# Patient Record
Sex: MALE | Race: WHITE | NOT HISPANIC OR LATINO | Employment: OTHER | URBAN - METROPOLITAN AREA
[De-identification: names, ages, dates, MRNs, and addresses within clinical notes are randomized per-mention and may not be internally consistent; named-entity substitution may affect disease eponyms.]

---

## 2017-01-09 ENCOUNTER — HOSPITAL ENCOUNTER (OUTPATIENT)
Dept: PAIN MEDICINE | Facility: HOSPITAL | Age: 62
Discharge: HOME | End: 2017-01-09
Attending: PHYSICAL MEDICINE & REHABILITATION | Admitting: PHYSICAL MEDICINE & REHABILITATION

## 2017-01-09 LAB
AMPHETAMINES UR QL SCN: NEGATIVE
BZE UR QL SCN: NEGATIVE
CREAT 24H UR-MCNC: NORMAL MG/DL
METHADONE UR QL SCN: NEGATIVE
OPIATE CONFIRMATION URINE: NORMAL
THC SERPLBLD CFM-MCNC: NEGATIVE NG/ML

## 2017-03-02 ENCOUNTER — HOSPITAL ENCOUNTER (OUTPATIENT)
Dept: PAIN MEDICINE | Facility: HOSPITAL | Age: 62
Discharge: HOME | End: 2017-03-02
Attending: PHYSICAL MEDICINE & REHABILITATION | Admitting: PHYSICAL MEDICINE & REHABILITATION

## 2017-04-25 ENCOUNTER — HOSPITAL ENCOUNTER (OUTPATIENT)
Dept: SLEEP MEDICINE | Facility: HOSPITAL | Age: 62
Discharge: HOME | End: 2017-04-25
Attending: INTERNAL MEDICINE | Admitting: INTERNAL MEDICINE

## 2017-04-27 ENCOUNTER — HOSPITAL ENCOUNTER (OUTPATIENT)
Dept: PAIN MEDICINE | Facility: HOSPITAL | Age: 62
Discharge: HOME | End: 2017-04-27
Attending: PHYSICAL MEDICINE & REHABILITATION | Admitting: PHYSICAL MEDICINE & REHABILITATION

## 2017-06-29 ENCOUNTER — HOSPITAL ENCOUNTER (OUTPATIENT)
Dept: PAIN MEDICINE | Facility: HOSPITAL | Age: 62
Discharge: HOME | End: 2017-06-29
Attending: PHYSICAL MEDICINE & REHABILITATION | Admitting: PHYSICAL MEDICINE & REHABILITATION

## 2017-08-24 ENCOUNTER — HOSPITAL ENCOUNTER (OUTPATIENT)
Dept: PAIN MEDICINE | Facility: HOSPITAL | Age: 62
Discharge: HOME | End: 2017-08-24
Attending: PHYSICAL MEDICINE & REHABILITATION | Admitting: PHYSICAL MEDICINE & REHABILITATION

## 2017-10-26 ENCOUNTER — HOSPITAL ENCOUNTER (OUTPATIENT)
Dept: PAIN MEDICINE | Facility: HOSPITAL | Age: 62
Discharge: HOME | End: 2017-10-26
Attending: PHYSICAL MEDICINE & REHABILITATION | Admitting: PHYSICAL MEDICINE & REHABILITATION

## 2017-12-28 ENCOUNTER — HOSPITAL ENCOUNTER (OUTPATIENT)
Dept: PAIN MEDICINE | Facility: HOSPITAL | Age: 62
Discharge: HOME | End: 2017-12-28
Attending: PHYSICAL MEDICINE & REHABILITATION | Admitting: PHYSICAL MEDICINE & REHABILITATION

## 2018-02-22 ENCOUNTER — HOSPITAL ENCOUNTER (OUTPATIENT)
Dept: PAIN MEDICINE | Facility: HOSPITAL | Age: 63
Discharge: HOME | End: 2018-02-22
Attending: PHYSICAL MEDICINE & REHABILITATION | Admitting: PHYSICAL MEDICINE & REHABILITATION

## 2018-04-26 ENCOUNTER — HOSPITAL ENCOUNTER (OUTPATIENT)
Dept: PAIN MEDICINE | Facility: HOSPITAL | Age: 63
Discharge: HOME | End: 2018-04-26
Attending: PHYSICAL MEDICINE & REHABILITATION | Admitting: PHYSICAL MEDICINE & REHABILITATION

## 2018-06-21 ENCOUNTER — HOSPITAL ENCOUNTER (OUTPATIENT)
Dept: PAIN MEDICINE | Facility: HOSPITAL | Age: 63
Discharge: HOME | End: 2018-06-21
Attending: PHYSICAL MEDICINE & REHABILITATION | Admitting: PHYSICAL MEDICINE & REHABILITATION

## 2023-02-13 ENCOUNTER — HOSPITAL ENCOUNTER (OUTPATIENT)
Facility: HOSPITAL | Age: 68
Discharge: NURSING, REHAB OR LONG-TERM CARE | End: 2023-02-16
Attending: EMERGENCY MEDICINE | Admitting: SURGERY
Payer: MEDICAID

## 2023-02-13 ENCOUNTER — APPOINTMENT (OUTPATIENT)
Dept: CT IMAGING | Facility: HOSPITAL | Age: 68
End: 2023-02-13
Payer: MEDICAID

## 2023-02-13 DIAGNOSIS — K81.0 ACUTE CHOLECYSTITIS: ICD-10-CM

## 2023-02-13 DIAGNOSIS — R10.11 ABDOMINAL PAIN, ACUTE, RIGHT UPPER QUADRANT: Primary | ICD-10-CM

## 2023-02-13 DIAGNOSIS — K80.00 CALCULUS OF GALLBLADDER WITH ACUTE CHOLECYSTITIS WITHOUT OBSTRUCTION: ICD-10-CM

## 2023-02-13 LAB
ALBUMIN SERPL-MCNC: 4.2 G/DL (ref 3.5–5.2)
ALBUMIN/GLOB SERPL: 1.2 G/DL
ALP SERPL-CCNC: 87 U/L (ref 39–117)
ALT SERPL W P-5'-P-CCNC: 13 U/L (ref 1–41)
ANION GAP SERPL CALCULATED.3IONS-SCNC: 11 MMOL/L (ref 5–15)
AST SERPL-CCNC: 16 U/L (ref 1–40)
BASOPHILS # BLD AUTO: 0.1 10*3/MM3 (ref 0–0.2)
BASOPHILS NFR BLD AUTO: 0.7 % (ref 0–1.5)
BILIRUB SERPL-MCNC: 0.4 MG/DL (ref 0–1.2)
BUN SERPL-MCNC: 8 MG/DL (ref 8–23)
BUN/CREAT SERPL: 9.6 (ref 7–25)
CALCIUM SPEC-SCNC: 9.2 MG/DL (ref 8.6–10.5)
CHLORIDE SERPL-SCNC: 99 MMOL/L (ref 98–107)
CO2 SERPL-SCNC: 24 MMOL/L (ref 22–29)
CREAT SERPL-MCNC: 0.83 MG/DL (ref 0.76–1.27)
DEPRECATED RDW RBC AUTO: 48.1 FL (ref 37–54)
EGFRCR SERPLBLD CKD-EPI 2021: 95.3 ML/MIN/1.73
EOSINOPHIL # BLD AUTO: 0.1 10*3/MM3 (ref 0–0.4)
EOSINOPHIL NFR BLD AUTO: 0.4 % (ref 0.3–6.2)
ERYTHROCYTE [DISTWIDTH] IN BLOOD BY AUTOMATED COUNT: 15.2 % (ref 12.3–15.4)
GLOBULIN UR ELPH-MCNC: 3.5 GM/DL
GLUCOSE SERPL-MCNC: 189 MG/DL (ref 65–99)
HCT VFR BLD AUTO: 41.7 % (ref 37.5–51)
HGB BLD-MCNC: 14 G/DL (ref 13–17.7)
LIPASE SERPL-CCNC: 20 U/L (ref 13–60)
LYMPHOCYTES # BLD AUTO: 1.6 10*3/MM3 (ref 0.7–3.1)
LYMPHOCYTES NFR BLD AUTO: 9.1 % (ref 19.6–45.3)
MCH RBC QN AUTO: 28.5 PG (ref 26.6–33)
MCHC RBC AUTO-ENTMCNC: 33.5 G/DL (ref 31.5–35.7)
MCV RBC AUTO: 85 FL (ref 79–97)
MONOCYTES # BLD AUTO: 1.4 10*3/MM3 (ref 0.1–0.9)
MONOCYTES NFR BLD AUTO: 8.5 % (ref 5–12)
NEUTROPHILS NFR BLD AUTO: 13.9 10*3/MM3 (ref 1.7–7)
NEUTROPHILS NFR BLD AUTO: 81.3 % (ref 42.7–76)
NRBC BLD AUTO-RTO: 0 /100 WBC (ref 0–0.2)
PLATELET # BLD AUTO: 287 10*3/MM3 (ref 140–450)
PMV BLD AUTO: 8.4 FL (ref 6–12)
POTASSIUM SERPL-SCNC: 4.9 MMOL/L (ref 3.5–5.2)
PROT SERPL-MCNC: 7.7 G/DL (ref 6–8.5)
RBC # BLD AUTO: 4.91 10*6/MM3 (ref 4.14–5.8)
SODIUM SERPL-SCNC: 134 MMOL/L (ref 136–145)
WBC NRBC COR # BLD: 17.1 10*3/MM3 (ref 3.4–10.8)

## 2023-02-13 PROCEDURE — 80053 COMPREHEN METABOLIC PANEL: CPT | Performed by: NURSE PRACTITIONER

## 2023-02-13 PROCEDURE — 74176 CT ABD & PELVIS W/O CONTRAST: CPT

## 2023-02-13 PROCEDURE — G0378 HOSPITAL OBSERVATION PER HR: HCPCS

## 2023-02-13 PROCEDURE — 99285 EMERGENCY DEPT VISIT HI MDM: CPT

## 2023-02-13 PROCEDURE — 83690 ASSAY OF LIPASE: CPT | Performed by: NURSE PRACTITIONER

## 2023-02-13 PROCEDURE — 85025 COMPLETE CBC W/AUTO DIFF WBC: CPT | Performed by: NURSE PRACTITIONER

## 2023-02-13 RX ORDER — SODIUM CHLORIDE 0.9 % (FLUSH) 0.9 %
10 SYRINGE (ML) INJECTION AS NEEDED
Status: DISCONTINUED | OUTPATIENT
Start: 2023-02-13 | End: 2023-02-16 | Stop reason: HOSPADM

## 2023-02-13 RX ORDER — OLANZAPINE 10 MG/2ML
1 INJECTION, POWDER, LYOPHILIZED, FOR SOLUTION INTRAMUSCULAR
Status: DISCONTINUED | OUTPATIENT
Start: 2023-02-13 | End: 2023-02-16 | Stop reason: HOSPADM

## 2023-02-13 RX ORDER — NICOTINE POLACRILEX 4 MG
15 LOZENGE BUCCAL
Status: DISCONTINUED | OUTPATIENT
Start: 2023-02-13 | End: 2023-02-16 | Stop reason: HOSPADM

## 2023-02-13 RX ORDER — ONDANSETRON 2 MG/ML
4 INJECTION INTRAMUSCULAR; INTRAVENOUS EVERY 6 HOURS PRN
Status: DISCONTINUED | OUTPATIENT
Start: 2023-02-13 | End: 2023-02-16 | Stop reason: HOSPADM

## 2023-02-13 RX ORDER — KETOROLAC TROMETHAMINE 30 MG/ML
15 INJECTION, SOLUTION INTRAMUSCULAR; INTRAVENOUS ONCE
Status: DISCONTINUED | OUTPATIENT
Start: 2023-02-13 | End: 2023-02-13

## 2023-02-13 RX ORDER — SODIUM CHLORIDE 0.9 % (FLUSH) 0.9 %
10 SYRINGE (ML) INJECTION EVERY 12 HOURS SCHEDULED
Status: DISCONTINUED | OUTPATIENT
Start: 2023-02-13 | End: 2023-02-16 | Stop reason: HOSPADM

## 2023-02-13 RX ORDER — SODIUM CHLORIDE 9 MG/ML
40 INJECTION, SOLUTION INTRAVENOUS AS NEEDED
Status: DISCONTINUED | OUTPATIENT
Start: 2023-02-13 | End: 2023-02-16 | Stop reason: HOSPADM

## 2023-02-13 RX ORDER — ONDANSETRON 2 MG/ML
8 INJECTION INTRAMUSCULAR; INTRAVENOUS ONCE
Status: DISCONTINUED | OUTPATIENT
Start: 2023-02-13 | End: 2023-02-13

## 2023-02-13 RX ORDER — PANTOPRAZOLE SODIUM 40 MG/10ML
40 INJECTION, POWDER, LYOPHILIZED, FOR SOLUTION INTRAVENOUS ONCE
Status: DISCONTINUED | OUTPATIENT
Start: 2023-02-13 | End: 2023-02-13

## 2023-02-13 RX ORDER — NITROGLYCERIN 0.4 MG/1
0.4 TABLET SUBLINGUAL
Status: DISCONTINUED | OUTPATIENT
Start: 2023-02-13 | End: 2023-02-16 | Stop reason: HOSPADM

## 2023-02-13 RX ORDER — SODIUM CHLORIDE 9 MG/ML
100 INJECTION, SOLUTION INTRAVENOUS CONTINUOUS
Status: DISCONTINUED | OUTPATIENT
Start: 2023-02-13 | End: 2023-02-16

## 2023-02-13 RX ORDER — PANTOPRAZOLE SODIUM 40 MG/10ML
40 INJECTION, POWDER, LYOPHILIZED, FOR SOLUTION INTRAVENOUS
Status: DISCONTINUED | OUTPATIENT
Start: 2023-02-14 | End: 2023-02-16 | Stop reason: HOSPADM

## 2023-02-13 RX ORDER — DEXTROSE MONOHYDRATE 25 G/50ML
25 INJECTION, SOLUTION INTRAVENOUS
Status: DISCONTINUED | OUTPATIENT
Start: 2023-02-13 | End: 2023-02-16 | Stop reason: HOSPADM

## 2023-02-13 RX ORDER — INSULIN LISPRO 100 [IU]/ML
2-7 INJECTION, SOLUTION INTRAVENOUS; SUBCUTANEOUS
Status: DISCONTINUED | OUTPATIENT
Start: 2023-02-14 | End: 2023-02-16 | Stop reason: HOSPADM

## 2023-02-13 RX ORDER — ONDANSETRON 4 MG/1
4 TABLET, FILM COATED ORAL EVERY 6 HOURS PRN
Status: DISCONTINUED | OUTPATIENT
Start: 2023-02-13 | End: 2023-02-16 | Stop reason: HOSPADM

## 2023-02-14 ENCOUNTER — ANESTHESIA EVENT (OUTPATIENT)
Dept: PERIOP | Facility: HOSPITAL | Age: 68
End: 2023-02-14
Payer: MEDICAID

## 2023-02-14 PROBLEM — K81.0 ACUTE CHOLECYSTITIS: Status: ACTIVE | Noted: 2023-02-13

## 2023-02-14 LAB
ANION GAP SERPL CALCULATED.3IONS-SCNC: 11 MMOL/L (ref 5–15)
BACTERIA UR QL AUTO: ABNORMAL /HPF
BILIRUB UR QL STRIP: NEGATIVE
BUN SERPL-MCNC: 8 MG/DL (ref 8–23)
BUN/CREAT SERPL: 11 (ref 7–25)
CALCIUM SPEC-SCNC: 8.6 MG/DL (ref 8.6–10.5)
CHLORIDE SERPL-SCNC: 99 MMOL/L (ref 98–107)
CLARITY UR: CLEAR
CO2 SERPL-SCNC: 21 MMOL/L (ref 22–29)
COLOR UR: YELLOW
CREAT SERPL-MCNC: 0.73 MG/DL (ref 0.76–1.27)
DEPRECATED RDW RBC AUTO: 47.7 FL (ref 37–54)
EGFRCR SERPLBLD CKD-EPI 2021: 99.1 ML/MIN/1.73
ERYTHROCYTE [DISTWIDTH] IN BLOOD BY AUTOMATED COUNT: 15.1 % (ref 12.3–15.4)
GLUCOSE BLDC GLUCOMTR-MCNC: 147 MG/DL (ref 70–105)
GLUCOSE BLDC GLUCOMTR-MCNC: 154 MG/DL (ref 70–105)
GLUCOSE BLDC GLUCOMTR-MCNC: 167 MG/DL (ref 70–105)
GLUCOSE BLDC GLUCOMTR-MCNC: 170 MG/DL (ref 70–105)
GLUCOSE SERPL-MCNC: 159 MG/DL (ref 65–99)
GLUCOSE UR STRIP-MCNC: NEGATIVE MG/DL
HBA1C MFR BLD: 7.2 % (ref 3.5–5.6)
HCT VFR BLD AUTO: 39.9 % (ref 37.5–51)
HGB BLD-MCNC: 13 G/DL (ref 13–17.7)
HGB UR QL STRIP.AUTO: NEGATIVE
HYALINE CASTS UR QL AUTO: ABNORMAL /LPF
KETONES UR QL STRIP: NEGATIVE
LEUKOCYTE ESTERASE UR QL STRIP.AUTO: NEGATIVE
MCH RBC QN AUTO: 28.2 PG (ref 26.6–33)
MCHC RBC AUTO-ENTMCNC: 32.7 G/DL (ref 31.5–35.7)
MCV RBC AUTO: 86.2 FL (ref 79–97)
NITRITE UR QL STRIP: NEGATIVE
PH UR STRIP.AUTO: 7.5 [PH] (ref 5–8)
PLATELET # BLD AUTO: 238 10*3/MM3 (ref 140–450)
PMV BLD AUTO: 8.4 FL (ref 6–12)
POTASSIUM SERPL-SCNC: 4.8 MMOL/L (ref 3.5–5.2)
PROT UR QL STRIP: ABNORMAL
RBC # BLD AUTO: 4.63 10*6/MM3 (ref 4.14–5.8)
RBC # UR STRIP: ABNORMAL /HPF
REF LAB TEST METHOD: ABNORMAL
SODIUM SERPL-SCNC: 131 MMOL/L (ref 136–145)
SP GR UR STRIP: 1.02 (ref 1–1.03)
SQUAMOUS #/AREA URNS HPF: ABNORMAL /HPF
UROBILINOGEN UR QL STRIP: ABNORMAL
WBC # UR STRIP: ABNORMAL /HPF
WBC NRBC COR # BLD: 15.2 10*3/MM3 (ref 3.4–10.8)

## 2023-02-14 PROCEDURE — 80053 COMPREHEN METABOLIC PANEL: CPT | Performed by: STUDENT IN AN ORGANIZED HEALTH CARE EDUCATION/TRAINING PROGRAM

## 2023-02-14 PROCEDURE — 82962 GLUCOSE BLOOD TEST: CPT

## 2023-02-14 PROCEDURE — 85027 COMPLETE CBC AUTOMATED: CPT | Performed by: STUDENT IN AN ORGANIZED HEALTH CARE EDUCATION/TRAINING PROGRAM

## 2023-02-14 PROCEDURE — 96366 THER/PROPH/DIAG IV INF ADDON: CPT

## 2023-02-14 PROCEDURE — 81001 URINALYSIS AUTO W/SCOPE: CPT | Performed by: STUDENT IN AN ORGANIZED HEALTH CARE EDUCATION/TRAINING PROGRAM

## 2023-02-14 PROCEDURE — 36415 COLL VENOUS BLD VENIPUNCTURE: CPT | Performed by: STUDENT IN AN ORGANIZED HEALTH CARE EDUCATION/TRAINING PROGRAM

## 2023-02-14 PROCEDURE — G0378 HOSPITAL OBSERVATION PER HR: HCPCS

## 2023-02-14 PROCEDURE — 96361 HYDRATE IV INFUSION ADD-ON: CPT

## 2023-02-14 PROCEDURE — 63710000001 INSULIN LISPRO (HUMAN) PER 5 UNITS: Performed by: STUDENT IN AN ORGANIZED HEALTH CARE EDUCATION/TRAINING PROGRAM

## 2023-02-14 PROCEDURE — 96376 TX/PRO/DX INJ SAME DRUG ADON: CPT

## 2023-02-14 PROCEDURE — 25010000002 ONDANSETRON PER 1 MG: Performed by: STUDENT IN AN ORGANIZED HEALTH CARE EDUCATION/TRAINING PROGRAM

## 2023-02-14 PROCEDURE — 25010000002 PIPERACILLIN SOD-TAZOBACTAM PER 1 G: Performed by: STUDENT IN AN ORGANIZED HEALTH CARE EDUCATION/TRAINING PROGRAM

## 2023-02-14 PROCEDURE — 25010000002 MORPHINE PER 10 MG: Performed by: STUDENT IN AN ORGANIZED HEALTH CARE EDUCATION/TRAINING PROGRAM

## 2023-02-14 PROCEDURE — 96375 TX/PRO/DX INJ NEW DRUG ADDON: CPT

## 2023-02-14 PROCEDURE — 96365 THER/PROPH/DIAG IV INF INIT: CPT

## 2023-02-14 PROCEDURE — 99204 OFFICE O/P NEW MOD 45 MIN: CPT | Performed by: SURGERY

## 2023-02-14 PROCEDURE — 83036 HEMOGLOBIN GLYCOSYLATED A1C: CPT | Performed by: STUDENT IN AN ORGANIZED HEALTH CARE EDUCATION/TRAINING PROGRAM

## 2023-02-14 RX ORDER — ZOLPIDEM TARTRATE 10 MG/1
10 TABLET ORAL NIGHTLY
COMMUNITY
Start: 2023-01-26

## 2023-02-14 RX ORDER — BUPROPION HYDROCHLORIDE 300 MG/1
300 TABLET ORAL EVERY MORNING
COMMUNITY
Start: 2023-01-20

## 2023-02-14 RX ORDER — MELOXICAM 15 MG/1
15 TABLET ORAL ONCE
COMMUNITY
Start: 2023-01-22 | End: 2023-02-16 | Stop reason: HOSPADM

## 2023-02-14 RX ORDER — ALBUTEROL SULFATE 90 UG/1
2 AEROSOL, METERED RESPIRATORY (INHALATION) 4 TIMES DAILY PRN
COMMUNITY

## 2023-02-14 RX ORDER — BUPROPION HYDROCHLORIDE 150 MG/1
150 TABLET, EXTENDED RELEASE ORAL 3 TIMES DAILY
COMMUNITY
End: 2023-02-14

## 2023-02-14 RX ORDER — SERTRALINE HYDROCHLORIDE 100 MG/1
100 TABLET, FILM COATED ORAL EVERY MORNING
COMMUNITY
Start: 2023-01-20

## 2023-02-14 RX ORDER — ATORVASTATIN CALCIUM 10 MG/1
10 TABLET, FILM COATED ORAL ONCE
COMMUNITY
Start: 2023-01-20

## 2023-02-14 RX ORDER — TRAZODONE HYDROCHLORIDE 100 MG/1
100 TABLET ORAL NIGHTLY
COMMUNITY
Start: 2023-01-20

## 2023-02-14 RX ORDER — OMEPRAZOLE 20 MG/1
20 TABLET, DELAYED RELEASE ORAL ONCE
COMMUNITY
Start: 2023-01-05

## 2023-02-14 RX ORDER — VALBENAZINE 40 MG/1
40 CAPSULE ORAL EVERY MORNING
COMMUNITY
Start: 2017-06-29

## 2023-02-14 RX ADMIN — MORPHINE SULFATE 2 MG: 4 INJECTION, SOLUTION INTRAMUSCULAR; INTRAVENOUS at 09:16

## 2023-02-14 RX ADMIN — PIPERACILLIN AND TAZOBACTAM 4.5 G: 4; .5 INJECTION, POWDER, FOR SOLUTION INTRAVENOUS at 22:46

## 2023-02-14 RX ADMIN — PIPERACILLIN AND TAZOBACTAM 4.5 G: 4; .5 INJECTION, POWDER, FOR SOLUTION INTRAVENOUS at 00:03

## 2023-02-14 RX ADMIN — SODIUM CHLORIDE 100 ML/HR: 9 INJECTION, SOLUTION INTRAVENOUS at 00:03

## 2023-02-14 RX ADMIN — PIPERACILLIN AND TAZOBACTAM 4.5 G: 4; .5 INJECTION, POWDER, FOR SOLUTION INTRAVENOUS at 13:57

## 2023-02-14 RX ADMIN — ONDANSETRON 4 MG: 2 INJECTION INTRAMUSCULAR; INTRAVENOUS at 00:03

## 2023-02-14 RX ADMIN — MORPHINE SULFATE 2 MG: 4 INJECTION, SOLUTION INTRAMUSCULAR; INTRAVENOUS at 04:19

## 2023-02-14 RX ADMIN — MORPHINE SULFATE 2 MG: 4 INJECTION, SOLUTION INTRAMUSCULAR; INTRAVENOUS at 13:57

## 2023-02-14 RX ADMIN — Medication 10 ML: at 09:18

## 2023-02-14 RX ADMIN — PANTOPRAZOLE SODIUM 40 MG: 40 INJECTION, POWDER, LYOPHILIZED, FOR SOLUTION INTRAVENOUS at 06:20

## 2023-02-14 RX ADMIN — INSULIN LISPRO 2 UNITS: 100 INJECTION, SOLUTION INTRAVENOUS; SUBCUTANEOUS at 09:16

## 2023-02-14 RX ADMIN — MORPHINE SULFATE 2 MG: 4 INJECTION, SOLUTION INTRAMUSCULAR; INTRAVENOUS at 00:04

## 2023-02-14 RX ADMIN — INSULIN LISPRO 2 UNITS: 100 INJECTION, SOLUTION INTRAVENOUS; SUBCUTANEOUS at 17:25

## 2023-02-14 RX ADMIN — MORPHINE SULFATE 2 MG: 4 INJECTION, SOLUTION INTRAMUSCULAR; INTRAVENOUS at 19:31

## 2023-02-14 RX ADMIN — ONDANSETRON 4 MG: 2 INJECTION INTRAMUSCULAR; INTRAVENOUS at 19:31

## 2023-02-14 RX ADMIN — Medication 10 ML: at 22:47

## 2023-02-14 RX ADMIN — SODIUM CHLORIDE 100 ML/HR: 9 INJECTION, SOLUTION INTRAVENOUS at 06:22

## 2023-02-14 RX ADMIN — PIPERACILLIN AND TAZOBACTAM 4.5 G: 4; .5 INJECTION, POWDER, FOR SOLUTION INTRAVENOUS at 06:20

## 2023-02-14 NOTE — ED PROVIDER NOTES
Subjective   History of Present Illness  67-year-old male complaining onset of abdominal pain today.  He states it started out kind of in the midline in the upper part of the abdomen is now bit more right-sided he reports he has had subjective fever as well as chills.  Reports she has vomited 3 times he reports he has had loose stools x2.  He states he has not had much of an appetite today.  He states that he was originally concerned that something might of gone wrong with his hernia repair from 2010        Review of Systems    No past medical history on file.  History of hyperlipidemia, depression, muscle spasm, chronic pain  No Known Allergies    No past surgical history on file.  Knee repair from 2010  No family history on file.    Social History     Socioeconomic History   • Marital status: Single       Reports no unusual food water travel or activity    Objective   Physical Exam  Alert Luz Coma Scale 15   HEENT: Pupils equal and reactive to light. Conjunctivae are not injected. Normal tympanic membranes. Oropharynx and nares are normal.   Neck: Supple. Midline trachea. No JVD. No goiter.   Chest: Clear and equal breath sounds bilaterally, regular rate and rhythm without murmur or rub.   Abdomen: Positive bowel sounds, right upper quadrant tenderness noted positive Dodge sign the abdomen is obese but, nondistended. No rebound or peritoneal signs. No CVA tenderness.   Extremities no clubbing. cyanosis or edema. Motor sensory exam is normal. The full range of motion is intact   Skin: Warm and dry, no rashes or petechia.   Lymphatic: No regional lymphadenopathy. No calf pain, swelling or Homans sign    Procedures           ED Course      Labs Reviewed   COMPREHENSIVE METABOLIC PANEL - Abnormal; Notable for the following components:       Result Value    Glucose 189 (*)     Sodium 134 (*)     All other components within normal limits    Narrative:     GFR Normal >60  Chronic Kidney Disease <60  Kidney Failure  <15     CBC WITH AUTO DIFFERENTIAL - Abnormal; Notable for the following components:    WBC 17.10 (*)     Neutrophil % 81.3 (*)     Lymphocyte % 9.1 (*)     Neutrophils, Absolute 13.90 (*)     Monocytes, Absolute 1.40 (*)     All other components within normal limits   LIPASE - Normal   URINALYSIS W/ MICROSCOPIC IF INDICATED (NO CULTURE)   CBC AND DIFFERENTIAL    Narrative:     The following orders were created for panel order CBC & Differential.  Procedure                               Abnormality         Status                     ---------                               -----------         ------                     CBC Auto Differential[[id number]]        Abnormal            Final result                 Please view results for these tests on the individual orders.     Medications   sodium chloride 0.9 % flush 10 mL (has no administration in time range)   lactated ringers bolus 1,000 mL (has no administration in time range)   pantoprazole (PROTONIX) injection 40 mg (has no administration in time range)   ondansetron (ZOFRAN) injection 8 mg (has no administration in time range)   HYDROmorphone (DILAUDID) injection 0.5 mg (has no administration in time range)   ketorolac (TORADOL) injection 15 mg (has no administration in time range)     CT Abdomen Pelvis Without Contrast    Result Date: 2/13/2023  1.Findings highly suspicious for acute cholecystitis. 2.Small had a hernia. Electronically Signed: Jeet Oh  2/13/2023 8:18 PM EST  Workstation ID: [id number]                                         Medical Decision Making  Patient is was treated in the emergency department and felt better with ER therapy.  The patient will be admitted to hospitalist service and obtain surgical consultation.  The patient was agreeable with this plan of treatment.    Amount and/or Complexity of Data Reviewed  External Data Reviewed: labs, radiology, ECG and notes.  Labs: ordered. Decision-making details documented in ED  Course.  Radiology: ordered and independent interpretation performed.     Details: Radiologist suggested acute cholecystitis is fitting with a clinical picture  Discussion of management or test interpretation with external provider(s): Case was discussed with the hospitalist service    Risk  OTC drugs.  Prescription drug management.  Parenteral controlled substances.  Decision regarding hospitalization.    Risk Details: Risk of sepsis, risk of dehydration from persistent vomiting exist.  Comorbid factors were considered        Final diagnoses:   Abdominal pain, acute, right upper quadrant   Acute cholecystitis   Calculus of gallbladder with acute cholecystitis without obstruction       ED Disposition  ED Disposition     ED Disposition   Decision to Admit    Condition   --    Comment   Level of Care: Telemetry [5]   Diagnosis: Abdominal pain, acute, right upper quadrant [468221]   Admitting Physician: ZAHEER CHAVEZ [[id number]]   Attending Physician: ZAHEER CHAVEZ [[id number]]               No follow-up provider specified.       Medication List      No changes were made to your prescriptions during this visit.          Jonathan Jean MD  02/13/23 8850

## 2023-02-14 NOTE — H&P
[facility] Medicine Services      Patient Name: Tha Barron  : 1955  MRN: [id number]  Primary Care Physician:  Robert Laws MD  Date of admission: 2023      Subjective      Chief Complaint: abdominal pain    History of Present Illness: Tha Barron is a 67 y.o. male who presented to [facility] on 2023 complaining of abdominal pain.  Admits to 5-6/10 epigastric pain that radiates to RUQ  Ongoing since this afternoon complicated by subjective fevers, chills, and 3 episodes of vomiting.  As pain did not improve he went to [facility] for evalution.    In the ER, vitals 97.9F, HR 96, RR 16, /86, 99 RA.  Labs notable for glucose 189, sodium 134, lipase 20, white count 17.1.  CT a/p shows acute cholecystitis.  HE is to be admitted for surgical evaluation.    ROS   12 point ROS reviewed and negative except as mentioned above      Personal History     No past medical history on file.    No past surgical history on file.    Family History: family history is not on file. Otherwise pertinent FHx was reviewed and not pertinent to current issue.    Social History:      Home Medications:  Prior to Admission Medications     None            Allergies:  No Known Allergies    Objective      Vitals:   Temp:  [97.9 °F (36.6 °C)] 97.9 °F (36.6 °C)  Heart Rate:  [] 96  Resp:  [16-20] 16  BP: (124-146)/(86-90) 124/86    Physical Exam  Constitutional:       General: He is not in acute distress.     Appearance: Normal appearance. He is not toxic-appearing.   HENT:      Head: Normocephalic and atraumatic.      Nose: Nose normal. No congestion.      Mouth/Throat:      Pharynx: Oropharynx is clear. No oropharyngeal exudate.   Eyes:      General: No scleral icterus.  Cardiovascular:      Rate and Rhythm: Normal rate and regular rhythm.      Heart sounds: No murmur heard.    No friction rub. No gallop.   Pulmonary:      Effort: No respiratory distress.      Breath  sounds: No wheezing or rales.   Abdominal:      General: There is no distension.      Tenderness: There is no guarding.      Comments: RUQ tenderness   Musculoskeletal:         General: No swelling or deformity.      Cervical back: Normal range of motion. No rigidity.      Right lower leg: No edema.      Left lower leg: No edema.   Skin:     Coloration: Skin is not jaundiced.      Findings: No bruising or lesion.   Neurological:      General: No focal deficit present.      Mental Status: He is alert and oriented to person, place, and time.      Motor: No weakness.          Result Review    Result Review:  I have personally reviewed the results from the time of this admission to 2/13/2023 23:14 EST and agree with these findings:  [x]  Laboratory  []  Microbiology  [x]  Radiology  []  EKG/Telemetry   []  Cardiology/Vascular   []  Pathology  []  Old records  []  Other:        Assessment & Plan        Active Hospital Problems:  Active Hospital Problems    Diagnosis    • **Abdominal pain, acute, right upper quadrant      Plan:     #Acute Cholecystitis    - CT a/p shows acute cholecystitis with gallbladder wall thickening, pericholecystic inflammation and small gallstones    - lipase 20    - Zosyn, pharm to dose    - NPO    - morphine PRN pain    - Zofran PRN    - blood cultures    - wbc 17.1    - IVF NS @ 100/hr    - surgical consult    #DM    - glucose 189    - ISS    - hold home PO meds    - check A1c    - ISS    #Obesity    - BMI 35.0    - weight loss encouraged    DVT prophylaxis: SCDs    CODE STATUS:       Admission Status:  I believe this patient meets observation status.    I discussed the patient's findings and my recommendations with patient.    This patient has been examined wearing appropriate Personal Protective Equipment and discussed with Patient. 02/13/23      Signature: Electronically signed by Darlyn Henry DO, 02/13/23, 11:27 PM EST.

## 2023-02-14 NOTE — CONSULTS
GENERAL SURGERY CONSULT    Referring Provider: Elaine  Reason for Consultation: Cholecystitis    Patient Care Team:  Robert Laws MD as PCP - General    Chief complaint Right Upper Abdominal Pain    Subjective .     History of present illness: 67-year-old gentleman who presented overnight through the emergency department with complaints of right upper quadrant abdominal pain and pain in the epigastric region.  He states he had been having this pain since yesterday evening or perhaps a day before.  He had intermittently been having similar pain for the past few months.  He says the pain previously had been less severe and self-limited.  Due to the persistence and severity of the pain and did cause him to go to the ER last night.  In the emergency department the patient was found to have normal LFTs, elevated white count of 17,000.  A CT was performed that showed thickening of the gallbladder with pericholecystic fluid as well as stones in the gallbladder consistent with acute cholecystitis.  The patient was started on symptomatic control as well as antibiotics.  Surgery was contacted earlier today for consultation to discuss cholecystectomy.    The patient states that he takes no blood thinners at home.  He has had 2 prior hernia repairs and umbilical hernia as well as an inguinal hernia.    Review of Systems    Review of Systems   Constitutional: Positive for appetite change.   Gastrointestinal: Positive for abdominal pain.         History  History reviewed. No pertinent past medical history.  History reviewed.  Prior umbilical hernia repair, prior inguinal hernia repair   History reviewed. No pertinent family history.  Social History     Tobacco Use   • Smoking status: Every Day     Types: Cigarettes   Vaping Use   • Vaping Use: Never used   Substance Use Topics   • Alcohol use: Never   • Drug use: Never   , (Not in a hospital admission)  , Scheduled Meds:  insulin lispro, 2-7 Units, Subcutaneous, TID With  Meals  pantoprazole, 40 mg, Intravenous, Q AM  piperacillin-tazobactam, 4.5 g, Intravenous, Q8H  sodium chloride, 10 mL, Intravenous, Q12H    , Continuous Infusions:  Pharmacy to Dose Zosyn,   sodium chloride, 100 mL/hr, Last Rate: 100 mL/hr (02/14/23 0622)    , PRN Meds:  •  dextrose  •  dextrose  •  glucagon (human recombinant)  •  Morphine  •  nitroglycerin  •  ondansetron **OR** ondansetron  •  Pharmacy to Dose Zosyn  •  [COMPLETED] Insert Peripheral IV **AND** sodium chloride  •  sodium chloride  •  sodium chloride and Allergies:  Patient has no known allergies.    Objective     Vital Signs   Temp:  [97.9 °F (36.6 °C)-98.9 °F (37.2 °C)] 98.9 °F (37.2 °C)  Heart Rate:  [] 71  Resp:  [16-21] 21  BP: (116-146)/(55-90) 135/68    Physical Exam:       General Appearance:    Alert, cooperative, in no acute distress, unkempt appearance   Head:    Normocephalic, without obvious abnormality, atraumatic   Eyes:            Lids and lashes normal, conjunctivae and sclerae normal, no   icterus, no pallor, corneas clear   Ears:    Ears appear intact with no abnormalities noted   Lungs:     Breathing unlabored   Abdomen:     Scar near umbilicus, no masses or hernias. Not really tender on exam   Extremities:   Moves all extremities   Skin:   No bleeding, bruising or rash   Neurologic:   Cranial nerves 2 - 12 grossly intact       Results Review:  Lab Results (last 72 hours)     Procedure Component Value Units Date/Time    POC Glucose Once [[id number]]  (Abnormal) Collected: 02/14/23 1057    Specimen: Blood Updated: 02/14/23 1059     Glucose 147 mg/dL      Comment: Serial Number: [id number]:  [id number]       Hemoglobin A1c [[id number]]  (Abnormal) Collected: 02/14/23 0938    Specimen: Blood Updated: 02/14/23 1038     Hemoglobin A1C 7.2 %     Narrative:      Hemoglobin A1C Reference Range:    <5.7 %        Normal  5.7-6.4 %     Increased risk for diabetes  > 6.4 %        Diabetes       These guidelines have been  recommended by the American Diabetic Association for Hgb A1c.      The following 2010 guidelines have been recommended by the American Diabetes Association for Hemoglobin A1c.    HBA1c 5.7-6.4% Increased risk for future diabetes (pre-diabetes)  HBA1c     >6.4% Diabetes      Urinalysis, Microscopic Only - Urine, Clean Catch [[id number]]  (Abnormal) Collected: 02/14/23 0948    Specimen: Urine, Clean Catch Updated: 02/14/23 0957     RBC, UA 0-2 /HPF      WBC, UA None Seen /HPF      Bacteria, UA None Seen /HPF      Squamous Epithelial Cells, UA 0-2 /HPF      Hyaline Casts, UA None Seen /LPF      Methodology Automated Microscopy    Urinalysis With Microscopic If Indicated (No Culture) - Urine, Clean Catch [[id number]]  (Abnormal) Collected: 02/14/23 0948    Specimen: Urine, Clean Catch Updated: 02/14/23 0956     Color, UA Yellow     Appearance, UA Clear     pH, UA 7.5     Specific Gravity, UA 1.019     Glucose, UA Negative     Ketones, UA Negative     Bilirubin, UA Negative     Blood, UA Negative     Protein, UA 30 mg/dL (1+)     Leuk Esterase, UA Negative     Nitrite, UA Negative     Urobilinogen, UA 0.2 E.U./dL    CBC (No Diff) [[id number]]  (Abnormal) Collected: 02/14/23 0938    Specimen: Blood Updated: 02/14/23 0955     WBC 15.20 10*3/mm3      RBC 4.63 10*6/mm3      Hemoglobin 13.0 g/dL      Hematocrit 39.9 %      MCV 86.2 fL      MCH 28.2 pg      MCHC 32.7 g/dL      RDW 15.1 %      RDW-SD 47.7 fl      MPV 8.4 fL      Platelets 238 10*3/mm3     POC Glucose Once [[id number]]  (Abnormal) Collected: 02/14/23 0812    Specimen: Blood Updated: 02/14/23 0813     Glucose 170 mg/dL      Comment: Serial Number: [id number]:  [id number]       Basic Metabolic Panel [[id number]]  (Abnormal) Collected: 02/14/23 0728    Specimen: Blood Updated: 02/14/23 0807     Glucose 159 mg/dL      BUN 8 mg/dL      Creatinine 0.73 mg/dL      Sodium 131 mmol/L      Potassium 4.8 mmol/L      Comment: Slight hemolysis detected by analyzer. Results  may be affected.        Chloride 99 mmol/L      CO2 21.0 mmol/L      Calcium 8.6 mg/dL      BUN/Creatinine Ratio 11.0     Anion Gap 11.0 mmol/L      eGFR 99.1 mL/min/1.73     Narrative:      GFR Normal >60  Chronic Kidney Disease <60  Kidney Failure <15      Comprehensive Metabolic Panel [[id number]]  (Abnormal) Collected: 02/13/23 2147    Specimen: Blood Updated: 02/13/23 2213     Glucose 189 mg/dL      BUN 8 mg/dL      Creatinine 0.83 mg/dL      Sodium 134 mmol/L      Potassium 4.9 mmol/L      Chloride 99 mmol/L      CO2 24.0 mmol/L      Calcium 9.2 mg/dL      Total Protein 7.7 g/dL      Albumin 4.2 g/dL      ALT (SGPT) 13 U/L      AST (SGOT) 16 U/L      Alkaline Phosphatase 87 U/L      Total Bilirubin 0.4 mg/dL      Globulin 3.5 gm/dL      A/G Ratio 1.2 g/dL      BUN/Creatinine Ratio 9.6     Anion Gap 11.0 mmol/L      eGFR 95.3 mL/min/1.73     Narrative:      GFR Normal >60  Chronic Kidney Disease <60  Kidney Failure <15      Lipase [[id number]]  (Normal) Collected: 02/13/23 2147    Specimen: Blood Updated: 02/13/23 2213     Lipase 20 U/L     CBC & Differential [[id number]]  (Abnormal) Collected: 02/13/23 2147    Specimen: Blood Updated: 02/13/23 2157    Narrative:      The following orders were created for panel order CBC & Differential.  Procedure                               Abnormality         Status                     ---------                               -----------         ------                     CBC Auto Differential[[id number]]        Abnormal            Final result                 Please view results for these tests on the individual orders.    CBC Auto Differential [[id number]]  (Abnormal) Collected: 02/13/23 2147    Specimen: Blood Updated: 02/13/23 2157     WBC 17.10 10*3/mm3      RBC 4.91 10*6/mm3      Hemoglobin 14.0 g/dL      Hematocrit 41.7 %      MCV 85.0 fL      MCH 28.5 pg      MCHC 33.5 g/dL      RDW 15.2 %      RDW-SD 48.1 fl      MPV 8.4 fL      Platelets 287 10*3/mm3      Neutrophil %  81.3 %      Lymphocyte % 9.1 %      Monocyte % 8.5 %      Eosinophil % 0.4 %      Basophil % 0.7 %      Neutrophils, Absolute 13.90 10*3/mm3      Lymphocytes, Absolute 1.60 10*3/mm3      Monocytes, Absolute 1.40 10*3/mm3      Eosinophils, Absolute 0.10 10*3/mm3      Basophils, Absolute 0.10 10*3/mm3      nRBC 0.0 /100 WBC         Imaging Results (Last 72 Hours)     Procedure Component Value Units Date/Time    CT Abdomen Pelvis Without Contrast [[id number]] Collected: 02/13/23 2010     Updated: 02/13/23 2151    Narrative:      CT ABDOMEN PELVIS WO CONTRAST    Date of Exam: 2/13/2023 8:08 PM EST    Indication: abd pain.    Comparison: None available.    Technique: Axial CT images were obtained of the abdomen and pelvis without the administration of contrast. Sagittal and coronal reconstructions were performed.  Automated exposure control and iterative reconstruction methods were used.     FINDINGS:   *Lower Thorax: Lung bases are clear.  *Liver: Unremarkable.  *Gallbladder: Gallbladder wall thickening and pericholecystic inflammation/fashioning. Small radiopaque gallstones are identified.  *Pancreas: Normal.  *Spleen: Normal.  *Adrenal Glands: Normal.  *Kidneys: No renal stones or hydronephrosis bilaterally. Right renal cyst.  *Stomach:Small hiatal hernia.  *Bowel: Nonobstructive bowel gas pattern. No bowel wall inflammation.  *Appendix: Normal appendix.   *Peritoneal Cavity: No pneumoperitoneum.  No lymphadenopathy.   *Urinary Bladder: Unremarkable.  *Pelvis: No free pelvic fluid.  *Bones: No acute fractures or dislocations. No osseous destructive lesions. Multilevel spondylosis. Generalized osteopenia.  *    Impression:      1.Findings highly suspicious for acute cholecystitis.  2.Srinivasa had a hernia.              Electronically Signed: Jeet Oh    2/13/2023 8:18 PM EST    Workstation ID: [id number]          I reviewed the patient's new clinical results.  I reviewed the patient's new imaging results and agree with  the interpretation.  I reviewed the patient's other test results and agree with the interpretation      Assessment & Plan       Abdominal pain, acute, right upper quadrant    Acute cholecystitis      67-year-old gentleman with apparent acute cholecystitis.    Continue fluids and antibiotics.  I recommended that he undergo removal of his gallbladder for treatment and he is agreeable to this.  The risks and benefits of robotic assisted laparoscopic cholecystectomy, possible open operation were discussed with him.  We will plan for surgery tomorrow morning.  He can have clear liquids until midnight and then be n.p.o. after midnight.    I discussed the patient's findings and my recommendations with patient and nursing staff              This document has been electronically signed by Tha Franklin MD on February 14, 2023 14:52 EST      Tha Franklin MD  02/14/23  14:52 EST

## 2023-02-14 NOTE — CASE MANAGEMENT/SOCIAL WORK
Discharge Planning Assessment  [facility]     Patient Name: Tha Barron  MRN: [id number]  Today's Date: 2/14/2023    Admit Date: 2/13/2023    Plan: Return to [facility]   Discharge Needs Assessment     Row Name 02/14/23 1437       Living Environment    People in Home other (see comments)  Assisted Living    Current Living Arrangements assisted living facility  [facility]    Primary Care Provided by self;other (see comments)    Provides Primary Care For no one    Family Caregiver if Needed sibling(s)    Family Caregiver Names Kinjal    Quality of Family Relationships helpful;supportive    Able to Return to Prior Arrangements yes       Resource/Environmental Concerns    Transportation Concerns none       Transition Planning    Patient/Family Anticipates Transition to --  Return to Assisted Living    Transportation Anticipated family or friend will provide  [facility] if not working, or [facility]       Discharge Needs Assessment    Readmission Within the Last 30 Days no previous admission in last 30 days    Equipment Currently Used at Home none    Concerns to be Addressed no discharge needs identified               Discharge Plan     Row Name 02/14/23 1442       Plan    Plan Return to [facility]    Plan Comments Spoke with anita at bedside,plans to return to [facility]. Said his sister will take him back if it is her off day, if not need to call [facility]. Confirmed PCP and Pharmacy. Await surgery consult                 Demographic Summary     Row Name 02/14/23 1434       General Information    Admission Type observation    Arrived From emergency department    Referral Source emergency department    Reason for Consult discharge planning    Preferred Language English               Functional Status     Row Name 02/14/23 1437       Functional Status    Usual Activity Tolerance good    Current Activity Tolerance good       Functional  Status, IADL    Medications assistive person    Meal Preparation assistive person    Housekeeping assistive person    Laundry assistive person    Shopping assistive person       Mental Status    General Appearance WDL WDL         Met with patient in room wearing PPE: mask, face shield/goggles, gloves, gown.      Maintained distance greater than six feet and spent less than 15 minutes in the room.        Rosamaria Mejias RN

## 2023-02-14 NOTE — PLAN OF CARE
Goal Outcome Evaluation:  Plan of Care Reviewed With: patient        Progress: no change  Outcome Evaluation: pt to have CHOLECYSTECTOMY LAPAROSCOPIC WITH DAVINCI ROBOT tomorrow, NPO after midnight

## 2023-02-15 ENCOUNTER — ANESTHESIA (OUTPATIENT)
Dept: PERIOP | Facility: HOSPITAL | Age: 68
End: 2023-02-15
Payer: MEDICAID

## 2023-02-15 ENCOUNTER — APPOINTMENT (OUTPATIENT)
Dept: GENERAL RADIOLOGY | Facility: HOSPITAL | Age: 68
End: 2023-02-15
Payer: MEDICAID

## 2023-02-15 LAB
ABO GROUP BLD: NORMAL
ALBUMIN SERPL-MCNC: 3.7 G/DL (ref 3.5–5.2)
ALBUMIN/GLOB SERPL: 1 G/DL
ALP SERPL-CCNC: 163 U/L (ref 39–117)
ALT SERPL W P-5'-P-CCNC: 9 U/L (ref 1–41)
ANION GAP SERPL CALCULATED.3IONS-SCNC: 10 MMOL/L (ref 5–15)
ANION GAP SERPL CALCULATED.3IONS-SCNC: 12 MMOL/L (ref 5–15)
ANISOCYTOSIS BLD QL: ABNORMAL
APTT PPP: 28 SECONDS (ref 24–31)
AST SERPL-CCNC: 13 U/L (ref 1–40)
BILIRUB SERPL-MCNC: 0.4 MG/DL (ref 0–1.2)
BLD GP AB SCN SERPL QL: NEGATIVE
BUN SERPL-MCNC: 8 MG/DL (ref 8–23)
BUN SERPL-MCNC: 8 MG/DL (ref 8–23)
BUN/CREAT SERPL: 10.3 (ref 7–25)
BUN/CREAT SERPL: 10.5 (ref 7–25)
CALCIUM SPEC-SCNC: 8.7 MG/DL (ref 8.6–10.5)
CALCIUM SPEC-SCNC: 9 MG/DL (ref 8.6–10.5)
CHLORIDE SERPL-SCNC: 97 MMOL/L (ref 98–107)
CHLORIDE SERPL-SCNC: 97 MMOL/L (ref 98–107)
CO2 SERPL-SCNC: 23 MMOL/L (ref 22–29)
CO2 SERPL-SCNC: 25 MMOL/L (ref 22–29)
CREAT SERPL-MCNC: 0.76 MG/DL (ref 0.76–1.27)
CREAT SERPL-MCNC: 0.78 MG/DL (ref 0.76–1.27)
DEPRECATED RDW RBC AUTO: 46.4 FL (ref 37–54)
DEPRECATED RDW RBC AUTO: 47.3 FL (ref 37–54)
EGFRCR SERPLBLD CKD-EPI 2021: 97.1 ML/MIN/1.73
EGFRCR SERPLBLD CKD-EPI 2021: 97.9 ML/MIN/1.73
ERYTHROCYTE [DISTWIDTH] IN BLOOD BY AUTOMATED COUNT: 15 % (ref 12.3–15.4)
ERYTHROCYTE [DISTWIDTH] IN BLOOD BY AUTOMATED COUNT: 15.1 % (ref 12.3–15.4)
GLOBULIN UR ELPH-MCNC: 3.6 GM/DL
GLUCOSE BLDC GLUCOMTR-MCNC: 139 MG/DL (ref 70–105)
GLUCOSE BLDC GLUCOMTR-MCNC: 209 MG/DL (ref 70–105)
GLUCOSE BLDC GLUCOMTR-MCNC: 227 MG/DL (ref 70–105)
GLUCOSE BLDC GLUCOMTR-MCNC: 229 MG/DL (ref 70–105)
GLUCOSE SERPL-MCNC: 144 MG/DL (ref 65–99)
GLUCOSE SERPL-MCNC: 147 MG/DL (ref 65–99)
HCT VFR BLD AUTO: 37.9 % (ref 37.5–51)
HCT VFR BLD AUTO: 41.4 % (ref 37.5–51)
HGB BLD-MCNC: 12.6 G/DL (ref 13–17.7)
HGB BLD-MCNC: 13 G/DL (ref 13–17.7)
INR PPP: 1 (ref 0.93–1.1)
LYMPHOCYTES # BLD MANUAL: 1.76 10*3/MM3 (ref 0.7–3.1)
LYMPHOCYTES NFR BLD MANUAL: 3 % (ref 5–12)
MCH RBC QN AUTO: 27.7 PG (ref 26.6–33)
MCH RBC QN AUTO: 28.5 PG (ref 26.6–33)
MCHC RBC AUTO-ENTMCNC: 31.5 G/DL (ref 31.5–35.7)
MCHC RBC AUTO-ENTMCNC: 33.2 G/DL (ref 31.5–35.7)
MCV RBC AUTO: 85.8 FL (ref 79–97)
MCV RBC AUTO: 88 FL (ref 79–97)
MONOCYTES # BLD: 0.44 10*3/MM3 (ref 0.1–0.9)
NEUTROPHILS # BLD AUTO: 12.5 10*3/MM3 (ref 1.7–7)
NEUTROPHILS NFR BLD MANUAL: 85 % (ref 42.7–76)
PLAT MORPH BLD: NORMAL
PLATELET # BLD AUTO: 241 10*3/MM3 (ref 140–450)
PLATELET # BLD AUTO: 264 10*3/MM3 (ref 140–450)
PMV BLD AUTO: 8.5 FL (ref 6–12)
PMV BLD AUTO: 8.7 FL (ref 6–12)
POTASSIUM SERPL-SCNC: 4.5 MMOL/L (ref 3.5–5.2)
POTASSIUM SERPL-SCNC: 4.6 MMOL/L (ref 3.5–5.2)
PROT SERPL-MCNC: 7.3 G/DL (ref 6–8.5)
PROTHROMBIN TIME: 10.3 SECONDS (ref 9.6–11.7)
RBC # BLD AUTO: 4.42 10*6/MM3 (ref 4.14–5.8)
RBC # BLD AUTO: 4.71 10*6/MM3 (ref 4.14–5.8)
RH BLD: POSITIVE
SCAN SLIDE: NORMAL
SODIUM SERPL-SCNC: 132 MMOL/L (ref 136–145)
SODIUM SERPL-SCNC: 132 MMOL/L (ref 136–145)
T&S EXPIRATION DATE: NORMAL
VARIANT LYMPHS NFR BLD MANUAL: 3 % (ref 0–5)
VARIANT LYMPHS NFR BLD MANUAL: 9 % (ref 19.6–45.3)
WBC MORPH BLD: NORMAL
WBC NRBC COR # BLD: 14.7 10*3/MM3 (ref 3.4–10.8)
WBC NRBC COR # BLD: 17.2 10*3/MM3 (ref 3.4–10.8)

## 2023-02-15 PROCEDURE — 85025 COMPLETE CBC W/AUTO DIFF WBC: CPT | Performed by: SURGERY

## 2023-02-15 PROCEDURE — 47562 LAPAROSCOPIC CHOLECYSTECTOMY: CPT | Performed by: SPECIALIST/TECHNOLOGIST, OTHER

## 2023-02-15 PROCEDURE — 25010000002 SUCCINYLCHOLINE PER 20 MG: Performed by: NURSE ANESTHETIST, CERTIFIED REGISTERED

## 2023-02-15 PROCEDURE — 96375 TX/PRO/DX INJ NEW DRUG ADDON: CPT

## 2023-02-15 PROCEDURE — 80048 BASIC METABOLIC PNL TOTAL CA: CPT | Performed by: SURGERY

## 2023-02-15 PROCEDURE — 86901 BLOOD TYPING SEROLOGIC RH(D): CPT | Performed by: SURGERY

## 2023-02-15 PROCEDURE — 96376 TX/PRO/DX INJ SAME DRUG ADON: CPT

## 2023-02-15 PROCEDURE — 86901 BLOOD TYPING SEROLOGIC RH(D): CPT

## 2023-02-15 PROCEDURE — 47562 LAPAROSCOPIC CHOLECYSTECTOMY: CPT | Performed by: SURGERY

## 2023-02-15 PROCEDURE — 85007 BL SMEAR W/DIFF WBC COUNT: CPT | Performed by: SURGERY

## 2023-02-15 PROCEDURE — 86850 RBC ANTIBODY SCREEN: CPT | Performed by: SURGERY

## 2023-02-15 PROCEDURE — 94640 AIRWAY INHALATION TREATMENT: CPT

## 2023-02-15 PROCEDURE — 63710000001 INSULIN LISPRO (HUMAN) PER 5 UNITS: Performed by: SURGERY

## 2023-02-15 PROCEDURE — 25010000002 MORPHINE PER 10 MG: Performed by: STUDENT IN AN ORGANIZED HEALTH CARE EDUCATION/TRAINING PROGRAM

## 2023-02-15 PROCEDURE — G0378 HOSPITAL OBSERVATION PER HR: HCPCS

## 2023-02-15 PROCEDURE — 96361 HYDRATE IV INFUSION ADD-ON: CPT

## 2023-02-15 PROCEDURE — 86900 BLOOD TYPING SEROLOGIC ABO: CPT | Performed by: SURGERY

## 2023-02-15 PROCEDURE — 85730 THROMBOPLASTIN TIME PARTIAL: CPT | Performed by: SURGERY

## 2023-02-15 PROCEDURE — 82962 GLUCOSE BLOOD TEST: CPT

## 2023-02-15 PROCEDURE — 86900 BLOOD TYPING SEROLOGIC ABO: CPT

## 2023-02-15 PROCEDURE — 25010000002 PIPERACILLIN SOD-TAZOBACTAM PER 1 G: Performed by: STUDENT IN AN ORGANIZED HEALTH CARE EDUCATION/TRAINING PROGRAM

## 2023-02-15 PROCEDURE — 25010000002 ONDANSETRON PER 1 MG: Performed by: NURSE ANESTHETIST, CERTIFIED REGISTERED

## 2023-02-15 PROCEDURE — 25010000002 DEXAMETHASONE PER 1 MG: Performed by: NURSE ANESTHETIST, CERTIFIED REGISTERED

## 2023-02-15 PROCEDURE — 25010000002 PIPERACILLIN SOD-TAZOBACTAM PER 1 G: Performed by: SURGERY

## 2023-02-15 PROCEDURE — 63710000001 INSULIN LISPRO (HUMAN) PER 5 UNITS: Performed by: STUDENT IN AN ORGANIZED HEALTH CARE EDUCATION/TRAINING PROGRAM

## 2023-02-15 PROCEDURE — 25010000002 MORPHINE PER 10 MG: Performed by: SURGERY

## 2023-02-15 PROCEDURE — 25010000002 HYDROMORPHONE 1 MG/ML SOLUTION: Performed by: SURGERY

## 2023-02-15 PROCEDURE — 25010000002 ONDANSETRON PER 1 MG: Performed by: SURGERY

## 2023-02-15 PROCEDURE — 25010000002 FENTANYL CITRATE (PF) 100 MCG/2ML SOLUTION: Performed by: NURSE ANESTHETIST, CERTIFIED REGISTERED

## 2023-02-15 PROCEDURE — 88304 TISSUE EXAM BY PATHOLOGIST: CPT | Performed by: SURGERY

## 2023-02-15 PROCEDURE — 85610 PROTHROMBIN TIME: CPT | Performed by: SURGERY

## 2023-02-15 PROCEDURE — 71045 X-RAY EXAM CHEST 1 VIEW: CPT

## 2023-02-15 PROCEDURE — 25010000002 PROPOFOL 200 MG/20ML EMULSION: Performed by: NURSE ANESTHETIST, CERTIFIED REGISTERED

## 2023-02-15 PROCEDURE — 25010000002 ONDANSETRON PER 1 MG: Performed by: STUDENT IN AN ORGANIZED HEALTH CARE EDUCATION/TRAINING PROGRAM

## 2023-02-15 DEVICE — LARGE LIGATION CLIPS 6 CLIPS/CART
Type: IMPLANTABLE DEVICE | Site: ABDOMEN | Status: FUNCTIONAL
Brand: VAS-Q-CLIP

## 2023-02-15 DEVICE — HEMOST ABS SURGICEL PWDR 3GM: Type: IMPLANTABLE DEVICE | Site: ABDOMEN | Status: FUNCTIONAL

## 2023-02-15 RX ORDER — MELOXICAM 15 MG/1
15 TABLET ORAL ONCE
Status: COMPLETED | OUTPATIENT
Start: 2023-02-15 | End: 2023-02-15

## 2023-02-15 RX ORDER — SUCCINYLCHOLINE CHLORIDE 20 MG/ML
INJECTION INTRAMUSCULAR; INTRAVENOUS AS NEEDED
Status: DISCONTINUED | OUTPATIENT
Start: 2023-02-15 | End: 2023-02-15 | Stop reason: SURG

## 2023-02-15 RX ORDER — LIDOCAINE HYDROCHLORIDE 10 MG/ML
INJECTION, SOLUTION EPIDURAL; INFILTRATION; INTRACAUDAL; PERINEURAL AS NEEDED
Status: DISCONTINUED | OUTPATIENT
Start: 2023-02-15 | End: 2023-02-15 | Stop reason: SURG

## 2023-02-15 RX ORDER — SODIUM CHLORIDE, SODIUM LACTATE, POTASSIUM CHLORIDE, CALCIUM CHLORIDE 600; 310; 30; 20 MG/100ML; MG/100ML; MG/100ML; MG/100ML
1000 INJECTION, SOLUTION INTRAVENOUS CONTINUOUS
Status: DISCONTINUED | OUTPATIENT
Start: 2023-02-15 | End: 2023-02-15

## 2023-02-15 RX ORDER — ACETAMINOPHEN 500 MG
1000 TABLET ORAL ONCE
Status: COMPLETED | OUTPATIENT
Start: 2023-02-15 | End: 2023-02-15

## 2023-02-15 RX ORDER — ONDANSETRON 2 MG/ML
INJECTION INTRAMUSCULAR; INTRAVENOUS AS NEEDED
Status: DISCONTINUED | OUTPATIENT
Start: 2023-02-15 | End: 2023-02-15 | Stop reason: SURG

## 2023-02-15 RX ORDER — ONDANSETRON 2 MG/ML
4 INJECTION INTRAMUSCULAR; INTRAVENOUS ONCE AS NEEDED
Status: DISCONTINUED | OUTPATIENT
Start: 2023-02-15 | End: 2023-02-15 | Stop reason: HOSPADM

## 2023-02-15 RX ORDER — MAGNESIUM HYDROXIDE 1200 MG/15ML
LIQUID ORAL AS NEEDED
Status: DISCONTINUED | OUTPATIENT
Start: 2023-02-15 | End: 2023-02-15 | Stop reason: HOSPADM

## 2023-02-15 RX ORDER — SODIUM CHLORIDE 0.9 % (FLUSH) 0.9 %
10 SYRINGE (ML) INJECTION AS NEEDED
Status: DISCONTINUED | OUTPATIENT
Start: 2023-02-15 | End: 2023-02-15 | Stop reason: HOSPADM

## 2023-02-15 RX ORDER — IPRATROPIUM BROMIDE AND ALBUTEROL SULFATE 2.5; .5 MG/3ML; MG/3ML
3 SOLUTION RESPIRATORY (INHALATION) ONCE
Status: COMPLETED | OUTPATIENT
Start: 2023-02-15 | End: 2023-02-15

## 2023-02-15 RX ORDER — OXYCODONE HYDROCHLORIDE 5 MG/1
10 TABLET ORAL ONCE
Status: COMPLETED | OUTPATIENT
Start: 2023-02-15 | End: 2023-02-15

## 2023-02-15 RX ORDER — INDOCYANINE GREEN AND WATER 25 MG
5 KIT INJECTION ONCE
Status: DISCONTINUED | OUTPATIENT
Start: 2023-02-15 | End: 2023-02-16 | Stop reason: HOSPADM

## 2023-02-15 RX ORDER — BUPIVACAINE HYDROCHLORIDE AND EPINEPHRINE 5; 5 MG/ML; UG/ML
INJECTION, SOLUTION EPIDURAL; INTRACAUDAL; PERINEURAL AS NEEDED
Status: DISCONTINUED | OUTPATIENT
Start: 2023-02-15 | End: 2023-02-15 | Stop reason: HOSPADM

## 2023-02-15 RX ORDER — HYDROCODONE BITARTRATE AND ACETAMINOPHEN 5; 325 MG/1; MG/1
1 TABLET ORAL EVERY 4 HOURS PRN
Status: DISCONTINUED | OUTPATIENT
Start: 2023-02-15 | End: 2023-02-16 | Stop reason: HOSPADM

## 2023-02-15 RX ORDER — IPRATROPIUM BROMIDE AND ALBUTEROL SULFATE 2.5; .5 MG/3ML; MG/3ML
3 SOLUTION RESPIRATORY (INHALATION) ONCE AS NEEDED
Status: DISCONTINUED | OUTPATIENT
Start: 2023-02-15 | End: 2023-02-15 | Stop reason: HOSPADM

## 2023-02-15 RX ORDER — SODIUM CHLORIDE 9 MG/ML
75 INJECTION, SOLUTION INTRAVENOUS CONTINUOUS
Status: DISCONTINUED | OUTPATIENT
Start: 2023-02-15 | End: 2023-02-16 | Stop reason: HOSPADM

## 2023-02-15 RX ORDER — NALOXONE HCL 0.4 MG/ML
0.1 VIAL (ML) INJECTION
Status: DISCONTINUED | OUTPATIENT
Start: 2023-02-15 | End: 2023-02-16 | Stop reason: HOSPADM

## 2023-02-15 RX ORDER — DEXAMETHASONE SODIUM PHOSPHATE 4 MG/ML
INJECTION, SOLUTION INTRA-ARTICULAR; INTRALESIONAL; INTRAMUSCULAR; INTRAVENOUS; SOFT TISSUE AS NEEDED
Status: DISCONTINUED | OUTPATIENT
Start: 2023-02-15 | End: 2023-02-15 | Stop reason: SURG

## 2023-02-15 RX ORDER — FENTANYL CITRATE 50 UG/ML
INJECTION, SOLUTION INTRAMUSCULAR; INTRAVENOUS AS NEEDED
Status: DISCONTINUED | OUTPATIENT
Start: 2023-02-15 | End: 2023-02-15 | Stop reason: SURG

## 2023-02-15 RX ORDER — PHENYLEPHRINE HCL IN 0.9% NACL 1 MG/10 ML
SYRINGE (ML) INTRAVENOUS AS NEEDED
Status: DISCONTINUED | OUTPATIENT
Start: 2023-02-15 | End: 2023-02-15 | Stop reason: SURG

## 2023-02-15 RX ORDER — PROMETHAZINE HYDROCHLORIDE 25 MG/1
25 TABLET ORAL ONCE AS NEEDED
Status: DISCONTINUED | OUTPATIENT
Start: 2023-02-15 | End: 2023-02-15 | Stop reason: HOSPADM

## 2023-02-15 RX ORDER — ROCURONIUM BROMIDE 10 MG/ML
INJECTION, SOLUTION INTRAVENOUS AS NEEDED
Status: DISCONTINUED | OUTPATIENT
Start: 2023-02-15 | End: 2023-02-15 | Stop reason: SURG

## 2023-02-15 RX ORDER — INDOCYANINE GREEN AND WATER 25 MG
5 KIT INJECTION ONCE
Status: COMPLETED | OUTPATIENT
Start: 2023-02-15 | End: 2023-02-15

## 2023-02-15 RX ORDER — PROPOFOL 10 MG/ML
INJECTION, EMULSION INTRAVENOUS AS NEEDED
Status: DISCONTINUED | OUTPATIENT
Start: 2023-02-15 | End: 2023-02-15 | Stop reason: SURG

## 2023-02-15 RX ORDER — PROMETHAZINE HYDROCHLORIDE 25 MG/1
25 SUPPOSITORY RECTAL ONCE AS NEEDED
Status: DISCONTINUED | OUTPATIENT
Start: 2023-02-15 | End: 2023-02-15 | Stop reason: HOSPADM

## 2023-02-15 RX ADMIN — PIPERACILLIN AND TAZOBACTAM 4.5 G: 4; .5 INJECTION, POWDER, FOR SOLUTION INTRAVENOUS at 05:43

## 2023-02-15 RX ADMIN — MORPHINE SULFATE 2 MG: 4 INJECTION, SOLUTION INTRAMUSCULAR; INTRAVENOUS at 04:39

## 2023-02-15 RX ADMIN — HYDROCODONE BITARTRATE AND ACETAMINOPHEN 1 TABLET: 5; 325 TABLET ORAL at 22:35

## 2023-02-15 RX ADMIN — INSULIN LISPRO 3 UNITS: 100 INJECTION, SOLUTION INTRAVENOUS; SUBCUTANEOUS at 17:21

## 2023-02-15 RX ADMIN — PIPERACILLIN AND TAZOBACTAM 4.5 G: 4; .5 INJECTION, POWDER, FOR SOLUTION INTRAVENOUS at 21:15

## 2023-02-15 RX ADMIN — SODIUM CHLORIDE 75 ML/HR: 9 INJECTION, SOLUTION INTRAVENOUS at 12:11

## 2023-02-15 RX ADMIN — ONDANSETRON 4 MG: 2 INJECTION INTRAMUSCULAR; INTRAVENOUS at 09:35

## 2023-02-15 RX ADMIN — FENTANYL CITRATE 100 MCG: 50 INJECTION INTRAMUSCULAR; INTRAVENOUS at 09:49

## 2023-02-15 RX ADMIN — HYDROMORPHONE HYDROCHLORIDE 0.5 MG: 1 INJECTION, SOLUTION INTRAMUSCULAR; INTRAVENOUS; SUBCUTANEOUS at 21:14

## 2023-02-15 RX ADMIN — ROCURONIUM BROMIDE 10 MG: 50 INJECTION, SOLUTION INTRAVENOUS at 10:12

## 2023-02-15 RX ADMIN — ROCURONIUM BROMIDE 45 MG: 50 INJECTION, SOLUTION INTRAVENOUS at 09:20

## 2023-02-15 RX ADMIN — IPRATROPIUM BROMIDE AND ALBUTEROL SULFATE 3 ML: .5; 3 SOLUTION RESPIRATORY (INHALATION) at 08:48

## 2023-02-15 RX ADMIN — SODIUM CHLORIDE 100 ML/HR: 9 INJECTION, SOLUTION INTRAVENOUS at 11:27

## 2023-02-15 RX ADMIN — PIPERACILLIN AND TAZOBACTAM 4.5 G: 4; .5 INJECTION, POWDER, FOR SOLUTION INTRAVENOUS at 14:47

## 2023-02-15 RX ADMIN — LIDOCAINE HYDROCHLORIDE 50 MG: 10 INJECTION, SOLUTION EPIDURAL; INFILTRATION; INTRACAUDAL; PERINEURAL at 09:15

## 2023-02-15 RX ADMIN — MORPHINE SULFATE 2 MG: 4 INJECTION, SOLUTION INTRAMUSCULAR; INTRAVENOUS at 17:36

## 2023-02-15 RX ADMIN — Medication 100 MCG: at 09:41

## 2023-02-15 RX ADMIN — OXYCODONE HYDROCHLORIDE 10 MG: 5 TABLET ORAL at 08:59

## 2023-02-15 RX ADMIN — ONDANSETRON 4 MG: 2 INJECTION INTRAMUSCULAR; INTRAVENOUS at 21:15

## 2023-02-15 RX ADMIN — MORPHINE SULFATE 2 MG: 4 INJECTION, SOLUTION INTRAMUSCULAR; INTRAVENOUS at 00:19

## 2023-02-15 RX ADMIN — ROCURONIUM BROMIDE 5 MG: 50 INJECTION, SOLUTION INTRAVENOUS at 09:15

## 2023-02-15 RX ADMIN — MELOXICAM 15 MG: 15 TABLET ORAL at 08:59

## 2023-02-15 RX ADMIN — INDOCYANINE GREEN AND WATER 5 MG: KIT at 08:58

## 2023-02-15 RX ADMIN — PROPOFOL 200 MG: 10 INJECTION, EMULSION INTRAVENOUS at 09:15

## 2023-02-15 RX ADMIN — ONDANSETRON 4 MG: 2 INJECTION INTRAMUSCULAR; INTRAVENOUS at 04:39

## 2023-02-15 RX ADMIN — PANTOPRAZOLE SODIUM 40 MG: 40 INJECTION, POWDER, LYOPHILIZED, FOR SOLUTION INTRAVENOUS at 05:43

## 2023-02-15 RX ADMIN — SUCCINYLCHOLINE CHLORIDE 160 MG: 20 INJECTION, SOLUTION INTRAMUSCULAR; INTRAVENOUS at 09:15

## 2023-02-15 RX ADMIN — SUGAMMADEX 100 MG: 100 INJECTION, SOLUTION INTRAVENOUS at 10:38

## 2023-02-15 RX ADMIN — SODIUM CHLORIDE 100 ML/HR: 9 INJECTION, SOLUTION INTRAVENOUS at 04:30

## 2023-02-15 RX ADMIN — PIPERACILLIN AND TAZOBACTAM 4.5 G: 4; .5 INJECTION, POWDER, FOR SOLUTION INTRAVENOUS at 09:12

## 2023-02-15 RX ADMIN — HYDROCODONE BITARTRATE AND ACETAMINOPHEN 1 TABLET: 5; 325 TABLET ORAL at 14:56

## 2023-02-15 RX ADMIN — INSULIN LISPRO 3 UNITS: 100 INJECTION, SOLUTION INTRAVENOUS; SUBCUTANEOUS at 11:23

## 2023-02-15 RX ADMIN — ACETAMINOPHEN 1000 MG: 500 TABLET, FILM COATED ORAL at 08:59

## 2023-02-15 RX ADMIN — SODIUM CHLORIDE, SODIUM LACTATE, POTASSIUM CHLORIDE, AND CALCIUM CHLORIDE: .6; .31; .03; .02 INJECTION, SOLUTION INTRAVENOUS at 09:10

## 2023-02-15 RX ADMIN — Medication 10 ML: at 21:15

## 2023-02-15 RX ADMIN — DEXAMETHASONE SODIUM PHOSPHATE 8 MG: 4 INJECTION, SOLUTION INTRAMUSCULAR; INTRAVENOUS at 09:35

## 2023-02-15 NOTE — PROGRESS NOTES
[facility] Medicine Services Daily Progress Note    Patient Name: Tha Barron  : 1955  MRN: [id number]  Primary Care Physician:  Robert Laws MD  Date of admission: 2023      Subjective      Chief Complaint: abdominal pain     History of Present Illness: Tha Barron is a 67 y.o. male who presented to [facility] on 2023 complaining of abdominal pain.  Admits to 5-6/10 epigastric pain that radiates to RUQ  Ongoing since this afternoon complicated by subjective fevers, chills, and 3 episodes of vomiting.  As pain did not improve he went to [facility] for evalution.     In the ER, vitals 97.9F, HR 96, RR 16, /86, 99 RA.  Labs notable for glucose 189, sodium 134, lipase 20, white count 17.1.  CT a/p shows acute cholecystitis.  HE is to be admitted for surgical evaluation.        23 patient seen and examined in bed NAD, surgery recommended that he undergo removal of his gallbladder for treatment and he is agreeable to this.      ROS   12 point ROS reviewed and negative except as mentioned above  Objective      Vitals:   Temp:  [98 °F (36.7 °C)-98.9 °F (37.2 °C)] 98.1 °F (36.7 °C)  Heart Rate:  [71-92] 72  Resp:  [18-22] 22  BP: (115-141)/(55-81) 115/80    Physical Exam Constitutional:       General: He is not in acute distress.     Appearance: Normal appearance. He is not toxic-appearing.   HENT:      Head: Normocephalic and atraumatic.      Nose: Nose normal. No congestion.      Mouth/Throat:      Pharynx: Oropharynx is clear. No oropharyngeal exudate.   Eyes:      General: No scleral icterus.  Cardiovascular:      Rate and Rhythm: Normal rate and regular rhythm.      Heart sounds: No murmur heard.    No friction rub. No gallop.   Pulmonary:      Effort: No respiratory distress.      Breath sounds: No wheezing or rales.   Abdominal:      General: There is no distension.      Tenderness: There is no guarding.      Comments: RUQ tenderness    Musculoskeletal:         General: No swelling or deformity.      Cervical back: Normal range of motion. No rigidity.      Right lower leg: No edema.      Left lower leg: No edema.   Skin:     Coloration: Skin is not jaundiced.      Findings: No bruising or lesion.   Neurological:      General: No focal deficit present.      Mental Status: He is alert and oriented to person, place, and time.      Motor: No weakness.          Result Review         Result Review    Result Review:  I have personally reviewed the results from the time of this admission to 2/14/2023 22:46 EST and agree with these findings:  []  Laboratory  []  Microbiology  []  Radiology  []  EKG/Telemetry   []  Cardiology/Vascular   []  Pathology  []  Old records  []  Other:  Most notable findings include:        CBC:      Lab 02/14/23  0938 02/13/23  2147   WBC 15.20* 17.10*   HEMOGLOBIN 13.0 14.0   HEMATOCRIT 39.9 41.7   PLATELETS 238 287   NEUTROS ABS  --  13.90*   LYMPHS ABS  --  1.60   MONOS ABS  --  1.40*   EOS ABS  --  0.10   MCV 86.2 85.0     CMP:      Lab 02/14/23  0728 02/13/23  2147   SODIUM 131* 134*   POTASSIUM 4.8 4.9   CHLORIDE 99 99   CO2 21.0* 24.0   ANION GAP 11.0 11.0   BUN 8 8   CREATININE 0.73* 0.83   EGFR 99.1 95.3   GLUCOSE 159* 189*   CALCIUM 8.6 9.2   TOTAL PROTEIN  --  7.7   ALBUMIN  --  4.2   GLOBULIN  --  3.5   ALT (SGPT)  --  13   AST (SGOT)  --  16   BILIRUBIN  --  0.4   ALK PHOS  --  87   LIPASE  --  20     No results found for: ACANTHNAEG, AFBCX, BPERTUSSISCX, BLOODCX  No results found for: BCIDPCR, CXREFLEX, CSFCX, CULTURETIS  No results found for: CULTURES, HSVCX, URCX  No results found for: EYECULTURE, GCCX, HSVCULTURE, LABHSV  No results found for: LEGIONELLA, MRSACX, MUMPSCX, MYCOPLASCX  No results found for: NOCARDIACX, STOOLCX  No results found for: THROATCX, UNSTIMCULT, URINECX, CULTURE, VZVCULTUR  No results found for: VIRALCULTU, WOUNDCX      Assessment & Plan      Brief Patient Summary:  Tha Barron  is a 67 y.o. male who       insulin lispro, 2-7 Units, Subcutaneous, TID With Meals  pantoprazole, 40 mg, Intravenous, Q AM  piperacillin-tazobactam, 4.5 g, Intravenous, Q8H  sodium chloride, 10 mL, Intravenous, Q12H       Pharmacy to Dose Zosyn,   sodium chloride, 100 mL/hr, Last Rate: 100 mL/hr (02/14/23 3968)         Active Hospital Problems:  Active Hospital Problems    Diagnosis    • **Abdominal pain, acute, right upper quadrant    • Acute cholecystitis    #Acute Cholecystitis    - CT a/p shows acute cholecystitis with gallbladder wall thickening, pericholecystic inflammation and small gallstones    - lipase 20    - Zosyn, pharm to dose    - NPO    - morphine PRN pain    - Zofran PRN    - blood cultures    - wbc 17.1    - IVF NS @ 100/hr    - surgical consulted.>recommended that he undergo removal of his gallbladder for treatment and he is agreeable to this.       #DM    - glucose 189    - ISS    - hold home PO meds    - check A1c    - ISS     #Obesity    - BMI 35.0    - weight loss encouraged     DVT prophylaxis: SCDs     CODE STATUS:        DVT prophylaxis:  Mechanical DVT prophylaxis orders are present.    CODE STATUS:    Code Status (Patient has no pulse and is not breathing): CPR (Attempt to Resuscitate)  Medical Interventions (Patient has pulse or is breathing): Full Support      Disposition:  I expect patient to be discharged home.    I have utilized all available, immediate resources to obtain, update, or review the patient's current medications including all prescriptions, over-the-counter products, herbals, cannabis/cannabidiol products, and vitamin.mineral/dietary (nutritional) supplements.      Code Status (Patient has no pulse and is not breathing): CPR (Attempt to Resuscitate)  Medical Interventions (Patient has pulse or is breathing): Full Support    Next of kin of Power of :         Admission Status:  I believe this patient meets obs status.      This patient has been examined wearing  appropriate Personal Protective Equipment and discussed with rn. 02/14/23      Electronically signed by Emanuel Mckinney MD, 02/14/23, 22:46 EST.  [facility] Hospitalist Team

## 2023-02-15 NOTE — ANESTHESIA POSTPROCEDURE EVALUATION
Patient: Tha Barron    Procedure Summary     Date: 02/15/23 Room / Location: [facility] OR 09 / [facility] MAIN OR    Anesthesia Start: 0910 Anesthesia Stop:     Procedure: CHOLECYSTECTOMY LAPAROSCOPIC WITH DAVINCI ROBOT (Abdomen) Diagnosis:       Acute cholecystitis      (Acute cholecystitis [K81.0])    Surgeons: Tha Franklin MD Provider: Madi Stark MD    Anesthesia Type: general ASA Status: 3          Anesthesia Type: general    Vitals  Vitals Value Taken Time   /64 02/15/23 1142   Temp 100 °F (37.8 °C) 02/15/23 1140   Pulse 84 02/15/23 1145   Resp 17 02/15/23 1140   SpO2 94 % 02/15/23 1145   Vitals shown include unvalidated device data.        Post Anesthesia Care and Evaluation    Patient location during evaluation: PACU  Patient participation: complete - patient participated  Level of consciousness: awake and alert  Pain management: adequate    Airway patency: patent  Anesthetic complications: No anesthetic complications  PONV Status: none  Cardiovascular status: acceptable  Respiratory status: acceptable  Hydration status: acceptable

## 2023-02-15 NOTE — ANESTHESIA PREPROCEDURE EVALUATION
Anesthesia Evaluation     NPO Solid Status: > 6 hours  NPO Liquid Status: > 6 hours           Airway   Mallampati: II  TM distance: >3 FB  Neck ROM: full  Dental    (+) edentulous    Pulmonary - normal exam   (+) a smoker Current, COPD (On O2 this admission for hypoxemia - 2LPM) moderate,   Cardiovascular - normal exam    (+) hyperlipidemia,       Neuro/Psych  (+) psychiatric history Anxiety and Depression,    GI/Hepatic/Renal/Endo    (+) obesity,   diabetes mellitus type 2,     ROS Comment: Acute cholecystitis    Musculoskeletal     Abdominal  - normal exam   Substance History      OB/GYN          Other        ROS/Med Hx Other:   Assisted living resident                Anesthesia Plan    ASA 3     general     intravenous induction     Anesthetic plan, risks, benefits, and alternatives have been provided, discussed and informed consent has been obtained with: patient.    Plan discussed with CRNA and CAA.        CODE STATUS:    Code Status (Patient has no pulse and is not breathing): CPR (Attempt to Resuscitate)  Medical Interventions (Patient has pulse or is breathing): Full Support

## 2023-02-15 NOTE — BRIEF OP NOTE
CHOLECYSTECTOMY LAPAROSCOPIC WITH DAVINCI ROBOT  Progress Note    Tha Barron  2/15/2023    Pre-op Diagnosis:   Acute cholecystitis [K81.0]       Post-Op Diagnosis Codes:     * Acute cholecystitis [K81.0]    Procedure/CPT® Codes:        Procedure(s):  CHOLECYSTECTOMY LAPAROSCOPIC WITH DAVINCI ROBOT        Surgeon(s):  Tha Franklin MD    Anesthesia: General    Staff:   Circulator: Christi Ortega RN; Camila Masters RN  Scrub Person: Danisha Delgado RN; Cassie Parmar  Assistant: Ezio Downs CSA  Assistant: Ezio Downs CSA      Estimated Blood Loss: 100ml    Urine Voided: * No values recorded between 2/15/2023  9:09 AM and 2/15/2023 10:45 AM *    Specimens:                Specimens     ID Source Type Tests Collected By Collected At Frozen?    A Gallbladder Tissue · TISSUE PATHOLOGY EXAM   Tha Franklin MD 2/15/23 0846     Description: robotic lap renetta                Drains: * No LDAs found *    Findings: cholecystitis, cholelithiasis    Complications: none    Assistant: Ezio Downs CSA  was responsible for performing the following activities: insertion and exchange of robotic instrumentation, skin closure and dressing placement and their skilled assistance was necessary for the success of this case.          This document has been electronically signed by Tha Franklin MD on February 15, 2023 10:46 EST      Tha Franklin MD     Date: 2/15/2023  Time: 10:45 EST

## 2023-02-15 NOTE — PLAN OF CARE
Goal Outcome Evaluation:   VSS. Aox4. Pain is at a 5/10 after lap renetta this morning. No requests for pain medication. Incisions clean, dry, & intact. Bed in lowest position. Call light in reach. Able to make needs known. Care plan ongoing.

## 2023-02-15 NOTE — NURSING NOTE
Report called to Nellie on [facility].  Nurse that is assigned to patient unable to take call due to being in a bedside procedure.  Ext provided so receiving RN can call with questions

## 2023-02-15 NOTE — PLAN OF CARE
Goal Outcome Evaluation:  Plan of Care Reviewed With: patient        Progress: no change   Pain controlled with IV pain medication.  Vitals stable, patent to have surgery in am.   Will continue to monitor

## 2023-02-15 NOTE — PROGRESS NOTES
[facility] Medicine Services Daily Progress Note    Patient Name: Tha Barron  : 1955  MRN: [id number]  Primary Care Physician:  Robert Laws MD  Date of admission: 2023    Subjective      Chief Complaint: abdominal pain     History of Present Illness: Tha Barron is a 67 y.o. male who presented to [facility] on 2023 complaining of abdominal pain.  Admits to 5-6/10 epigastric pain that radiates to RUQ  Ongoing since this afternoon complicated by subjective fevers, chills, and 3 episodes of vomiting.  As pain did not improve he went to [facility] for evalution.     In the ER, vitals 97.9F, HR 96, RR 16, /86, 99 RA.  Labs notable for glucose 189, sodium 134, lipase 20, white count 17.1.  CT a/p shows acute cholecystitis.  HE is to be admitted for surgical evaluation.     23 patient seen and examined in bed [facility], surgery recommended that he undergo removal of his gallbladder for treatment and he is agreeable to this.    2/15/23  patient seen and examined in bed NAD, VSS s/p- CHOLECYSTECTOMY LAPAROSCOPIC WITH DAVINCI ROBOT    ROS   12 point ROS reviewed and negative except as mentioned above  Objective      Vitals:   Temp:  [98.1 °F (36.7 °C)-100 °F (37.8 °C)] 100 °F (37.8 °C)  Heart Rate:  [] 84  Resp:  [17-28] 17  BP: ()/(43-80) 126/64  Flow (L/min):  [1-10] 4    Physical Exam Constitutional:       General: He is not in acute distress.     Appearance: Normal appearance. He is not toxic-appearing.   HENT:      Head: Normocephalic and atraumatic.      Nose: Nose normal. No congestion.      Mouth/Throat:      Pharynx: Oropharynx is clear. No oropharyngeal exudate.   Eyes:      General: No scleral icterus.  Cardiovascular:      Rate and Rhythm: Normal rate and regular rhythm.      Heart sounds: No murmur heard.    No friction rub. No gallop.   Pulmonary:      Effort: No respiratory distress.      Breath sounds: No wheezing or rales.    Abdominal:      General: There is no distension.      Tenderness: There is no guarding.      Comments: RUQ tenderness   Musculoskeletal:         General: No swelling or deformity.      Cervical back: Normal range of motion. No rigidity.      Right lower leg: No edema.      Left lower leg: No edema.   Skin:     Coloration: Skin is not jaundiced.      Findings: No bruising or lesion.   Neurological:      General: No focal deficit present.      Mental Status: He is alert and oriented to person, place, and time.      Motor: No weakness.      Result Review    Result Review:  I have personally reviewed the results from the time of this admission to 2/15/2023 12:22 EST and agree with these findings:  []  Laboratory  []  Microbiology  []  Radiology  []  EKG/Telemetry   []  Cardiology/Vascular   []  Pathology  []  Old records  []  Other:  Most notable findings include:    Wounds (last 24 hours)     LDA Wound     Row Name 02/15/23 1144 02/15/23 1055 02/15/23 1037       Wound 02/15/23 0941 midline abdomen Incision    Wound - Properties Group Placement Date: 02/15/23  -CW Placement Time: 0941  -CW Present on Hospital Admission: N  -CW Orientation: midline  -CW, x 4 puncture sites  Location: abdomen  -CW Primary Wound Type: Incision  -CW    Dressing Appearance dry;intact;no drainage  -AH dry;intact;no drainage  -AH --    Closure Approximated;Liquid skin adhesive  -AH Approximated;Liquid skin adhesive  -AH Adhesive bandage  exoffin  -CW    Retired Wound - Properties Group Placement Date: 02/15/23  -CW Placement Time: 0941  -CW Present on Hospital Admission: N  -CW Orientation: midline  -CW, x 4 puncture sites  Location: abdomen  -CW Primary Wound Type: Incision  -CW    Retired Wound - Properties Group Date first assessed: 02/15/23  -CW Time first assessed: 0941  -CW Present on Hospital Admission: N  -CW Location: abdomen  -CW Primary Wound Type: Incision  -CW          User Key  (r) = Recorded By, (t) = Taken By, (c) = Cosigned  By    Initials Name Provider Type    Emmie Falcon, RN Registered Nurse     Camila Masters, RN Registered Nurse              CBC:      Lab 02/15/23  0852 02/14/23  2334 02/14/23  0938 02/13/23  2147   WBC 14.70* 17.20* 15.20* 17.10*   HEMOGLOBIN 12.6* 13.0 13.0 14.0   HEMATOCRIT 37.9 41.4 39.9 41.7   PLATELETS 241 264 238 287   NEUTROS ABS 12.50*  --   --  13.90*   LYMPHS ABS  --   --   --  1.60   MONOS ABS  --   --   --  1.40*   EOS ABS  --   --   --  0.10   MCV 85.8 88.0 86.2 85.0     CMP:        Lab 02/15/23  0852 02/14/23  2334 02/14/23  0728 02/13/23 2147   SODIUM 132* 132* 131* 134*   POTASSIUM 4.5 4.6 4.8 4.9   CHLORIDE 97* 97* 99 99   CO2 25.0 23.0 21.0* 24.0   ANION GAP 10.0 12.0 11.0 11.0   BUN 8 8 8 8   CREATININE 0.76 0.78 0.73* 0.83   EGFR 97.9 97.1 99.1 95.3   GLUCOSE 144* 147* 159* 189*   CALCIUM 8.7 9.0 8.6 9.2   TOTAL PROTEIN  --  7.3  --  7.7   ALBUMIN  --  3.7  --  4.2   GLOBULIN  --  3.6  --  3.5   ALT (SGPT)  --  9  --  13   AST (SGOT)  --  13  --  16   BILIRUBIN  --  0.4  --  0.4   ALK PHOS  --  163*  --  87   LIPASE  --   --   --  20     No results found for: ACANTHNAEG, AFBCX, BPERTUSSISCX, BLOODCX  No results found for: BCIDPCR, CXREFLEX, CSFCX, CULTURETIS  No results found for: CULTURES, HSVCX, URCX  No results found for: EYECULTURE, GCCX, HSVCULTURE, LABHSV  No results found for: LEGIONELLA, MRSACX, MUMPSCX, MYCOPLASCX  No results found for: NOCARDIACX, STOOLCX  No results found for: THROATCX, UNSTIMCULT, URINECX, CULTURE, VZVCULTUR  No results found for: VIRALCULTU, WOUNDCX    Assessment & Plan      Brief Patient Summary:  Tha Barron is a 67 y.o. male who       indocyanine green, 5 mg, Intravenous, Once  insulin lispro, 2-7 Units, Subcutaneous, TID With Meals  pantoprazole, 40 mg, Intravenous, Q AM  piperacillin-tazobactam, 4.5 g, Intravenous, Q8H  sodium chloride, 10 mL, Intravenous, Q12H       Pharmacy to Dose Zosyn,   sodium chloride, 100 mL/hr, Last Rate: 100 mL/hr  (02/15/23 1127)  sodium chloride, 75 mL/hr, Last Rate: 75 mL/hr (02/15/23 1211)       Active Hospital Problems:  Active Hospital Problems    Diagnosis    • **Abdominal pain, acute, right upper quadrant    • Acute cholecystitis      #Acute Cholecystitis    - CT a/p shows acute cholecystitis with gallbladder wall thickening, pericholecystic inflammation and small gallstones    - lipase 20    - Zosyn, pharm to dose    - NPO    - morphine PRN pain    - Zofran PRN    - blood cultures    - wbc 17.1    - IVF NS @ 100/hr    - surgical consulted.>s/p-CHOLECYSTECTOMY LAPAROSCOPIC WITH DAVINCI ROBOTs.  2/15/23     #DM    - glucose 189    - ISS    - hold home PO meds    - check A1c    - ISS     #Obesity    - BMI 35.0    - weight loss encouraged     DVT prophylaxis: SCDs     CODE STATUS:        DVT prophylaxis:  Mechanical DVT prophylaxis orders are present.    CODE STATUS:    Code Status (Patient has no pulse and is not breathing): CPR (Attempt to Resuscitate)  Medical Interventions (Patient has pulse or is breathing): Full Support      Disposition:  I expect patient to be discharged home.    I have utilized all available, immediate resources to obtain, update, or review the patient's current medications including all prescriptions, over-the-counter products, herbals, cannabis/cannabidiol products, and vitamin.mineral/dietary (nutritional) supplements.      Code Status (Patient has no pulse and is not breathing): CPR (Attempt to Resuscitate)  Medical Interventions (Patient has pulse or is breathing): Full Support    Next of kin of Power of :         Admission Status:  I believe this patient meets obs status.      This patient has been examined wearing appropriate Personal Protective Equipment and discussed with rn. 02/15/23      Electronically signed by Emanuel Mckinney MD, 02/15/23, 12:22 EST.  [facility]ist [facility]

## 2023-02-16 VITALS
SYSTOLIC BLOOD PRESSURE: 147 MMHG | TEMPERATURE: 98 F | WEIGHT: 277.78 LBS | HEIGHT: 75 IN | HEART RATE: 64 BPM | DIASTOLIC BLOOD PRESSURE: 84 MMHG | RESPIRATION RATE: 16 BRPM | OXYGEN SATURATION: 96 % | BODY MASS INDEX: 34.54 KG/M2

## 2023-02-16 LAB
ALBUMIN SERPL-MCNC: 3.2 G/DL (ref 3.5–5.2)
ALBUMIN/GLOB SERPL: 0.9 G/DL
ALP SERPL-CCNC: 61 U/L (ref 39–117)
ALT SERPL W P-5'-P-CCNC: 12 U/L (ref 1–41)
ANION GAP SERPL CALCULATED.3IONS-SCNC: 10 MMOL/L (ref 5–15)
AST SERPL-CCNC: 16 U/L (ref 1–40)
BILIRUB SERPL-MCNC: 0.3 MG/DL (ref 0–1.2)
BUN SERPL-MCNC: 10 MG/DL (ref 8–23)
BUN/CREAT SERPL: 12.7 (ref 7–25)
CALCIUM SPEC-SCNC: 8.8 MG/DL (ref 8.6–10.5)
CHLORIDE SERPL-SCNC: 98 MMOL/L (ref 98–107)
CO2 SERPL-SCNC: 26 MMOL/L (ref 22–29)
CREAT SERPL-MCNC: 0.79 MG/DL (ref 0.76–1.27)
DEPRECATED RDW RBC AUTO: 46.8 FL (ref 37–54)
EGFRCR SERPLBLD CKD-EPI 2021: 96.8 ML/MIN/1.73
ERYTHROCYTE [DISTWIDTH] IN BLOOD BY AUTOMATED COUNT: 15.1 % (ref 12.3–15.4)
GLOBULIN UR ELPH-MCNC: 3.6 GM/DL
GLUCOSE BLDC GLUCOMTR-MCNC: 157 MG/DL (ref 70–105)
GLUCOSE BLDC GLUCOMTR-MCNC: 159 MG/DL (ref 70–105)
GLUCOSE BLDC GLUCOMTR-MCNC: 232 MG/DL (ref 70–105)
GLUCOSE SERPL-MCNC: 201 MG/DL (ref 65–99)
HCT VFR BLD AUTO: 37.2 % (ref 37.5–51)
HGB BLD-MCNC: 11.8 G/DL (ref 13–17.7)
LAB AP CASE REPORT: NORMAL
MCH RBC QN AUTO: 27.8 PG (ref 26.6–33)
MCHC RBC AUTO-ENTMCNC: 31.6 G/DL (ref 31.5–35.7)
MCV RBC AUTO: 87.9 FL (ref 79–97)
PATH REPORT.FINAL DX SPEC: NORMAL
PATH REPORT.GROSS SPEC: NORMAL
PLATELET # BLD AUTO: 262 10*3/MM3 (ref 140–450)
PMV BLD AUTO: 8.5 FL (ref 6–12)
POTASSIUM SERPL-SCNC: 4.4 MMOL/L (ref 3.5–5.2)
PROT SERPL-MCNC: 6.8 G/DL (ref 6–8.5)
RBC # BLD AUTO: 4.23 10*6/MM3 (ref 4.14–5.8)
SODIUM SERPL-SCNC: 134 MMOL/L (ref 136–145)
WBC NRBC COR # BLD: 12.3 10*3/MM3 (ref 3.4–10.8)

## 2023-02-16 PROCEDURE — 82962 GLUCOSE BLOOD TEST: CPT

## 2023-02-16 PROCEDURE — 85027 COMPLETE CBC AUTOMATED: CPT | Performed by: INTERNAL MEDICINE

## 2023-02-16 PROCEDURE — 99024 POSTOP FOLLOW-UP VISIT: CPT | Performed by: SURGERY

## 2023-02-16 PROCEDURE — 63710000001 INSULIN LISPRO (HUMAN) PER 5 UNITS: Performed by: SURGERY

## 2023-02-16 PROCEDURE — 25010000002 PIPERACILLIN SOD-TAZOBACTAM PER 1 G: Performed by: SURGERY

## 2023-02-16 PROCEDURE — 80053 COMPREHEN METABOLIC PANEL: CPT | Performed by: INTERNAL MEDICINE

## 2023-02-16 PROCEDURE — G0378 HOSPITAL OBSERVATION PER HR: HCPCS

## 2023-02-16 RX ORDER — AMOXICILLIN AND CLAVULANATE POTASSIUM 875; 125 MG/1; MG/1
1 TABLET, FILM COATED ORAL 2 TIMES DAILY
Qty: 20 TABLET | Refills: 0 | Status: SHIPPED | OUTPATIENT
Start: 2023-02-16

## 2023-02-16 RX ORDER — HYDROCODONE BITARTRATE AND ACETAMINOPHEN 5; 325 MG/1; MG/1
1 TABLET ORAL EVERY 4 HOURS PRN
Qty: 20 TABLET | Refills: 0 | Status: SHIPPED | OUTPATIENT
Start: 2023-02-16 | End: 2023-02-22

## 2023-02-16 RX ADMIN — HYDROCODONE BITARTRATE AND ACETAMINOPHEN 1 TABLET: 5; 325 TABLET ORAL at 09:29

## 2023-02-16 RX ADMIN — HYDROCODONE BITARTRATE AND ACETAMINOPHEN 1 TABLET: 5; 325 TABLET ORAL at 04:43

## 2023-02-16 RX ADMIN — SODIUM CHLORIDE 100 ML/HR: 9 INJECTION, SOLUTION INTRAVENOUS at 04:43

## 2023-02-16 RX ADMIN — INSULIN LISPRO 3 UNITS: 100 INJECTION, SOLUTION INTRAVENOUS; SUBCUTANEOUS at 12:54

## 2023-02-16 RX ADMIN — HYDROCODONE BITARTRATE AND ACETAMINOPHEN 1 TABLET: 5; 325 TABLET ORAL at 14:37

## 2023-02-16 RX ADMIN — PANTOPRAZOLE SODIUM 40 MG: 40 INJECTION, POWDER, LYOPHILIZED, FOR SOLUTION INTRAVENOUS at 05:02

## 2023-02-16 RX ADMIN — PIPERACILLIN AND TAZOBACTAM 4.5 G: 4; .5 INJECTION, POWDER, FOR SOLUTION INTRAVENOUS at 05:02

## 2023-02-16 RX ADMIN — PIPERACILLIN AND TAZOBACTAM 4.5 G: 4; .5 INJECTION, POWDER, FOR SOLUTION INTRAVENOUS at 14:32

## 2023-02-16 RX ADMIN — INSULIN LISPRO 2 UNITS: 100 INJECTION, SOLUTION INTRAVENOUS; SUBCUTANEOUS at 09:29

## 2023-02-16 NOTE — PROGRESS NOTES
GENERAL SURGERY PROGRESS NOTE  Chief Complaint:  Surgery Follow up   LOS: 0 days       Subjective     Interval History:     Feels ok with some bloating has tolerated fulls. Pain controlled    Objective     Vital Signs  Temp:  [97.5 °F (36.4 °C)-98.9 °F (37.2 °C)] 98 °F (36.7 °C)  Heart Rate:  [64-80] 64  Resp:  [15-18] 16  BP: (117-147)/(63-84) 147/84    Physical Exam:   Abdomen soft, incisions CDI  Labs:  Lab Results (last 24 hours)     Procedure Component Value Units Date/Time    POC Glucose Once [[id number]]  (Abnormal) Collected: 02/16/23 1131    Specimen: Blood Updated: 02/16/23 1133     Glucose 232 mg/dL      Comment: Serial Number: [id number]:  [id number]       Comprehensive Metabolic Panel [[id number]]  (Abnormal) Collected: 02/16/23 1007    Specimen: Blood Updated: 02/16/23 1033     Glucose 201 mg/dL      BUN 10 mg/dL      Creatinine 0.79 mg/dL      Sodium 134 mmol/L      Potassium 4.4 mmol/L      Chloride 98 mmol/L      CO2 26.0 mmol/L      Calcium 8.8 mg/dL      Total Protein 6.8 g/dL      Albumin 3.2 g/dL      ALT (SGPT) 12 U/L      AST (SGOT) 16 U/L      Alkaline Phosphatase 61 U/L      Total Bilirubin 0.3 mg/dL      Globulin 3.6 gm/dL      A/G Ratio 0.9 g/dL      BUN/Creatinine Ratio 12.7     Anion Gap 10.0 mmol/L      eGFR 96.8 mL/min/1.73     Narrative:      GFR Normal >60  Chronic Kidney Disease <60  Kidney Failure <15      CBC (No Diff) [[id number]]  (Abnormal) Collected: 02/16/23 1007    Specimen: Blood Updated: 02/16/23 1020     WBC 12.30 10*3/mm3      RBC 4.23 10*6/mm3      Hemoglobin 11.8 g/dL      Hematocrit 37.2 %      MCV 87.9 fL      MCH 27.8 pg      MCHC 31.6 g/dL      RDW 15.1 %      RDW-SD 46.8 fl      MPV 8.5 fL      Platelets 262 10*3/mm3     POC Glucose Once [[id number]]  (Abnormal) Collected: 02/16/23 0738    Specimen: Blood Updated: 02/16/23 0740     Glucose 157 mg/dL      Comment: Serial Number: [id number]:  [id number]       POC Glucose Once [[id number]]  (Abnormal)  Collected: 02/15/23 2043    Specimen: Blood Updated: 02/15/23 2045     Glucose 229 mg/dL      Comment: Serial Number: [id number]:  [id number]       POC Glucose Once [[id number]]  (Abnormal) Collected: 02/15/23 1602    Specimen: Blood Updated: 02/15/23 1603     Glucose 227 mg/dL      Comment: Serial Number: [id number]:  [id number]              Results Review:     Labs and imaging for today were reviewed.    Assessment & Plan     Tha Barron is a 67 y.o. male who is s/p robotic renetta yesterday for cholecystitis      Overall looks ok. Can be discharged from my standpoint.   May shower, no tub bathing or submerging of incisions.  No lifting greater than 15 pounds.  Diet as tolerated.  Given the amount of inflammation and infection related to his gallbladder that the patient had he likely would do best if discharged on a week of oral antibiotics.          This document has been electronically signed by Tha Franklin MD on February 16, 2023 14:50 EST        Tha Franklin MD  02/16/23  14:50 EST

## 2023-02-16 NOTE — PLAN OF CARE
Goal Outcome Evaluation:  Plan of Care Reviewed With: patient        Progress: improving  Outcome Evaluation: Patient stable, alert and oriented.  Plan to d/c back to assisted living today.

## 2023-02-16 NOTE — CASE MANAGEMENT/SOCIAL WORK
Continued Stay Note   Ran     Patient Name: Tha Barron  MRN: [id number]  Today's Date: 2/16/2023    Admit Date: 2/13/2023    Plan: Return to [facility].   Discharge Plan     Row Name 02/16/23 1644       Plan    Plan Return to [facility].    Plan Comments Per Yolanda at [facility], patient okay to return                  Sheela Ruiz RN   Phone communication or documentation only - no physical contact with patient or family.

## 2023-02-16 NOTE — DISCHARGE SUMMARY
[facility] Medicine Services  DISCHARGE SUMMARY    Patient Name: Tha Barron  : 1955  MRN: [id number]    Date of Admission: 2023  Discharge Diagnosis: #Acute Cholecystitis  Date of Discharge:  23  Primary Care Physician: Robert Laws MD      Presenting Problem:   Acute cholecystitis [K81.0]  Abdominal pain, acute, right upper quadrant [R10.11]  Calculus of gallbladder with acute cholecystitis without obstruction [K80.00]    Active and Resolved Hospital Problems:  Active Hospital Problems    Diagnosis POA   • **Abdominal pain, acute, right upper quadrant [R10.11] Yes   • Acute cholecystitis [K81.0] Unknown      Resolved Hospital Problems   No resolved problems to display.     #Acute Cholecystitis    - CT a/p shows acute cholecystitis with gallbladder wall thickening, pericholecystic inflammation and small gallstones    - lipase 20    - Zosyn, >augmentin    - morphine PRN pain    - Zofran PRN    - blood cultures    - wbc 17.1    - IVF NS @ 100/hr    - surgical consulted.>s/p-CHOLECYSTECTOMY LAPAROSCOPIC WITH DAVINCI ROBOTs.  2/15/23     #DM    - glucose 189    - ISS    - hold home PO meds    - ISS     #Obesity  - BMI 35.0    - weight loss encouraged       Hospital Course     Tha Barron is a 67 y.o. male who presented to [facility] on 2023 complaining of abdominal pain.  Admits to 5-6/10 epigastric pain that radiates to RUQ  Ongoing since this afternoon complicated by subjective fevers, chills, and 3 episodes of vomiting.  As pain did not improve he went to [facility] for evalution.     In the ER, vitals 97.9F, HR 96, RR 16, /86, 99 RA.  Labs notable for glucose 189, sodium 134, lipase 20, white count 17.1.  CT a/p shows acute cholecystitis.  HE is to be admitted for surgical evaluation.     23 patient seen and examined in bed NAD, surgery recommended that he undergo removal of his gallbladder for treatment and he is agreeable  to this.    2/15/23  patient seen and examined in bed NAD, RESHMA s/p- CHOLECYSTECTOMY LAPAROSCOPIC WITH DAVINCI ROBOT   2/16/23 doing better today, will dc back to assisted living, condition on dc stable.    DISCHARGE Follow Up Recommendations for labs and diagnostics: follow with pcp in one week      Reasons For Change In Medications and Indications for New Medications:     START taking:  amoxicillin-clavulanate (Augmentin)   HYDROcodone-acetaminophen (NORCO)      STOP taking:  meloxicam 15 MG tablet (MOBIC)     Day of Discharge     Vital Signs:  Temp:  [97.5 °F (36.4 °C)-100 °F (37.8 °C)] 98.9 °F (37.2 °C)  Heart Rate:  [] 71  Resp:  [13-28] 18  BP: ()/(43-85) 122/64  Flow (L/min):  [1-10] 1      Physical Exam *Constitutional:       General: He is not in acute distress.     Appearance: Normal appearance. He is not toxic-appearing.   HENT:      Head: Normocephalic and atraumatic.      Nose: Nose normal. No congestion.      Mouth/Throat:      Pharynx: Oropharynx is clear. No oropharyngeal exudate.   Eyes:      General: No scleral icterus.  Cardiovascular:      Rate and Rhythm: Normal rate and regular rhythm.      Heart sounds: No murmur heard.    No friction rub. No gallop.   Pulmonary:      Effort: No respiratory distress.      Breath sounds: No wheezing or rales.   Abdominal:      General: There is no distension.      Tenderness: There is no guarding.      Comments: RUQ tenderness   Musculoskeletal:         General: No swelling or deformity.      Cervical back: Normal range of motion. No rigidity.      Right lower leg: No edema.      Left lower leg: No edema.   Skin:     Coloration: Skin is not jaundiced.      Findings: No bruising or lesion.   Neurological:      General: No focal deficit present.      Mental Status: He is alert and oriented to person, place, and time.      Motor: No weakness.        Pertinent  and/or Most Recent Results     LAB RESULTS:      Lab 02/15/23  0852 02/14/23  1603  02/14/23  0938 02/13/23 2147   WBC 14.70* 17.20* 15.20* 17.10*   HEMOGLOBIN 12.6* 13.0 13.0 14.0   HEMATOCRIT 37.9 41.4 39.9 41.7   PLATELETS 241 264 238 287   NEUTROS ABS 12.50*  --   --  13.90*   LYMPHS ABS  --   --   --  1.60   MONOS ABS  --   --   --  1.40*   EOS ABS  --   --   --  0.10   MCV 85.8 88.0 86.2 85.0   PROTIME 10.3  --   --   --    APTT 28.0  --   --   --          Lab 02/15/23  0852 02/14/23  2334 02/14/23 0938 02/14/23 0728 02/13/23 2147   SODIUM 132* 132*  --  131* 134*   POTASSIUM 4.5 4.6  --  4.8 4.9   CHLORIDE 97* 97*  --  99 99   CO2 25.0 23.0  --  21.0* 24.0   ANION GAP 10.0 12.0  --  11.0 11.0   BUN 8 8  --  8 8   CREATININE 0.76 0.78  --  0.73* 0.83   EGFR 97.9 97.1  --  99.1 95.3   GLUCOSE 144* 147*  --  159* 189*   CALCIUM 8.7 9.0  --  8.6 9.2   HEMOGLOBIN A1C  --   --  7.2*  --   --          Lab 02/14/23  2334 02/13/23 2147   TOTAL PROTEIN 7.3 7.7   ALBUMIN 3.7 4.2   GLOBULIN 3.6 3.5   ALT (SGPT) 9 13   AST (SGOT) 13 16   BILIRUBIN 0.4 0.4   ALK PHOS 163* 87   LIPASE  --  20         Lab 02/15/23  0852   PROTIME 10.3   INR 1.00             Lab 02/15/23  0852   ABO TYPING O   RH TYPING Positive   ANTIBODY SCREEN Negative         Brief Urine Lab Results  (Last result in the past 365 days)      Color   Clarity   Blood   Leuk Est   Nitrite   Protein   CREAT   Urine HCG        02/14/23 0948 Yellow   Clear   Negative   Negative   Negative   30 mg/dL (1+)               Microbiology Results (last 10 days)     ** No results found for the last 240 hours. **          CT Abdomen Pelvis Without Contrast    Result Date: 2/13/2023  Impression: 1.Findings highly suspicious for acute cholecystitis. 2.Small had a hernia. Electronically Signed: Jeet Oh  2/13/2023 8:18 PM EST  Workstation ID: [id number]    XR Chest 1 View    Result Date: 2/15/2023  Impression: Impression: Negative limited portable chest Electronically Signed: Danie Mercado  2/15/2023 7:19 AM EST  Workstation ID:  [id number]                  Labs Pending at Discharge:  Pending Labs     Order Current Status    Tissue Pathology Exam In process          Procedures Performed  Procedure(s):  CHOLECYSTECTOMY LAPAROSCOPIC WITH DAVINCI ROBOT         Consults:   Consults     Date and Time Order Name Status Description    2/13/2023 11:44 PM Inpatient General Surgery Consult Completed             Discharge Details        Discharge Medications      New Medications      Instructions Start Date   amoxicillin-clavulanate 875-125 MG per tablet  Commonly known as: Augmentin   1 tablet, Oral, 2 Times Daily      HYDROcodone-acetaminophen 5-325 MG per tablet  Commonly known as: NORCO   1 tablet, Oral, Every 4 Hours PRN         Continue These Medications      Instructions Start Date   albuterol sulfate  (90 Base) MCG/ACT inhaler  Commonly known as: PROVENTIL HFA;VENTOLIN HFA;PROAIR HFA   2 puffs, Inhalation, 4 Times Daily PRN      atorvastatin 10 MG tablet  Commonly known as: LIPITOR   10 mg, Oral, Once      buPROPion  MG 24 hr tablet  Commonly known as: WELLBUTRIN XL   300 mg, Oral, Every Morning      GNP Omeprazole 20 MG tablet delayed-release  Generic drug: Omeprazole   20 mg, Oral, Once      Ingrezza 40 MG capsule  Generic drug: Valbenazine Tosylate   40 mg, Oral, Every Morning      metFORMIN 500 MG tablet  Commonly known as: GLUCOPHAGE   500 mg, Oral, 2 Times Daily      sertraline 100 MG tablet  Commonly known as: ZOLOFT   100 mg, Oral, Every Morning      traZODone 100 MG tablet  Commonly known as: DESYREL   100 mg, Oral, Nightly      zolpidem 10 MG tablet  Commonly known as: AMBIEN   10 mg, Oral, Nightly         Stop These Medications    meloxicam 15 MG tablet  Commonly known as: MOBIC            No Known Allergies      Discharge Disposition:   Skilled Nursing Facility (DC - External)    Diet:  Hospital:  Diet Order   Procedures   • Diet: Liquid Diets; Full Liquid; Texture: Regular Texture (IDDSI 7); Fluid Consistency: Thin (IDDSI  "0)         Discharge Activity:   Activity Instructions     Gradually Increase Activity Until at Pre-Hospitalization Level              CODE STATUS:  Code Status and Medical Interventions:   Ordered at: 02/13/23 2331     Code Status (Patient has no pulse and is not breathing):    CPR (Attempt to Resuscitate)     Medical Interventions (Patient has pulse or is breathing):    Full Support     I have utilized all available, immediate resources to obtain, update, or review the patient's current medications including all prescriptions, over-the-counter products, herbals, cannabis/cannabidiol products, and vitamin.mineral/dietary (nutritional) supplements.      Code Status (Patient has no pulse and is not breathing): CPR (Attempt to Resuscitate)  Medical Interventions (Patient has pulse or is breathing): Full Support    Next of kin of Power of : Kinjal Varela (Sister)   I confirmed that the patient's Advanced Care Plan is present, code status is documented, or surrogate decision maker is listed in the patient's medical record:  /No    The patient's Advanced Care plan is not present because \"I just haven't done it\"  Hospice care is currently being provided or has been provided this calendar year: No      Admission Status:  I believe this patient meets obs status.            No future appointments.    Additional Instructions for the Follow-ups that You Need to Schedule     Discharge Follow-up with PCP   As directed       Currently Documented PCP:    Robert Laws MD    PCP Phone Number:    [phone]     Follow Up Details: 1 week               Time spent on Discharge including face to face service:  34 minutes    This patient has been examined wearing appropriate Personal Protective Equipment and discussed with rn. 02/16/23      Signature: Electronically signed by Emanuel Mckinney MD, 02/16/23, 8:13 AM EST.    "

## 2023-02-16 NOTE — PLAN OF CARE
Goal Outcome Evaluation:  Plan of Care Reviewed With: patient        Progress: improving     Patients pain controlled over night with IV and PO pain medication.  Tolerating full liquid diet. Vitals stable.  Will continue to monitor

## 2023-02-17 NOTE — CASE MANAGEMENT/SOCIAL WORK
Case Management Discharge Note      Final Note: [facility] assisted living         Selected Continued Care - Discharged on 2/16/2023 Admission date: 2/13/2023 - Discharge disposition: Skilled Nursing Facility (DC - External)                Transportation Services  Private: Car    Final Discharge Disposition Code: 01 - home or self-care

## 2023-02-23 NOTE — OP NOTE
Operative Note    Tha SHIRLEY Barron  2/15/2023    Pre-op Diagnosis:   Acute cholecystitis [K81.0]    Post-op Diagnosis:     Post-Op Diagnosis Codes:     * Acute cholecystitis [K81.0]    Procedure/CPT® Codes:      Procedure(s):  CHOLECYSTECTOMY LAPAROSCOPIC WITH DAVINCI ROBOT    Surgeon(s):  Tha Franklin MD    Anesthesia: General    Staff:   Circulator: Christi Ortega RN; Camila Masters RN  Scrub Person: Danisha Delgado RN; Cassie Parmar  Assistant: Ezio Downs CSA    Estimated Blood Loss: 100ml    Specimens:                ID Type Source Tests Collected by Time   A : robotic lap renetta Tissue Gallbladder TISSUE PATHOLOGY EXAM Tha Franklin MD 2/15/2023 0846         Drains: * No LDAs found *    Findings: cholecystitis, cholelithiasis    Complications: none    Indication: Acute Cholecystitis    Operative Note:    The patient was seen and consent was obtained preoperatively.  Following this he was brought to the operating room and placed in supine position on the OR table.  General anesthetic was administered and the patient was orotracheally intubated without incident.  A preoperative briefing was performed.  The abdomen was prepped and draped in normal sterile fashion.  A timeout was then performed.    Following timeout local anesthetic was injected in the left upper quadrant where a small skin incision was made.  An 8 mm optical viewing trocar was then used to enter the abdomen without difficulty.  The abdomen was then insufflated without issue.  The area below trocar insertion was found to be without injury.  Additional ports were then placed under direct visualization after injection of local anesthetic.  A 12 mm port was placed in the left rectus muscle, 2 additional 8 mm ports were placed in the right rectus and right lateral abdomen.  The patient was then positioned in reverse Trendelenburg right side up position.  The robot was docked and instrumentation was  inserted.    The gallbladder was identified in the right upper quadrant.  There appeared to be thickening and inflammation of the gallbladder with inflammatory adhesions of the omentum to the gallbladder.  The gallbladder was elevated upward with a left upper quadrant progress.  This proved to be difficult owing to distention and thickening of the gallbladder.  Therefore a cholecystotomy was created and the bilious fluid from within the gallbladder was evacuated away using suction irrigation.  The gallbladder was then elevated in the cephalad direction.  Inflammatory adhesions of the omentum was replaced from the gallbladder.  The infundibulum was then grasped and elevated outward.  The gallbladder neck was mobilized out away from the liver by dividing the peritoneum on the medial lateral gallbladder neck.  Careful dissection near the base of the gallbladder was then undertaken until a single artery and single duct were found along to the gallbladder.  This view was augmented using firefly technology.  Clips were then placed, 2 on the downside the artery, 2 on the downside of the duct and 1 on the outside of the duct.  The duct and artery were then divided with cautery.  The gallbladder was then removed from its fossa using electrocautery.  Again it was noted that there was dense inflammation in this area consistent with cholecystitis.  The gallbladder was then removed via placement and to a bag and retrieved through the 12 mm port site.  Stones were found to be within the gallbladder.    The right upper quadrant was then irrigated and the irrigation was suctioned away.  The operative field was inspected and found to be hemostatic.  The probable port site was then closed with the use of a suture passer.  All instrumentation was then removed and the abdomen was desufflated.  All skin sites were closed with 4-0 Vicryl.  Skin glue was placed over the sites.  The patient was then awakened and returned  recovery.    Assistant: Ezio Downs CSA was responsible for performing the following activities: Insertion and exchange of robotic instrumentation, assistance in removal of gallbladder via bag, assistance and fascial closure using suture passer, closure of skin, dressing placement and their skilled assistance was necessary for the success of this case.          This document has been electronically signed by Tha Franklin MD on February 23, 2023 16:48 MEEK Franklin MD     Date: 2/23/2023  Time: 16:47 EST

## 2024-04-27 ENCOUNTER — APPOINTMENT (OUTPATIENT)
Dept: GENERAL RADIOLOGY | Facility: HOSPITAL | Age: 69
End: 2024-04-27
Payer: MEDICAID

## 2024-04-27 ENCOUNTER — HOSPITAL ENCOUNTER (INPATIENT)
Facility: HOSPITAL | Age: 69
LOS: 6 days | Discharge: NURSING, REHAB OR LONG-TERM CARE | End: 2024-05-03
Attending: EMERGENCY MEDICINE | Admitting: INTERNAL MEDICINE
Payer: MEDICAID

## 2024-04-27 DIAGNOSIS — S32.474A: Primary | ICD-10-CM

## 2024-04-27 PROBLEM — T14.8XXA FRACTURE: Status: ACTIVE | Noted: 2024-04-27

## 2024-04-27 LAB
ANION GAP SERPL CALCULATED.3IONS-SCNC: 13 MMOL/L (ref 5–15)
BASOPHILS # BLD AUTO: 0.02 10*3/MM3 (ref 0–0.2)
BASOPHILS NFR BLD AUTO: 0.1 % (ref 0–1.5)
BUN SERPL-MCNC: 24 MG/DL (ref 8–23)
BUN/CREAT SERPL: 23.1 (ref 7–25)
CALCIUM SPEC-SCNC: 9.6 MG/DL (ref 8.6–10.5)
CHLORIDE SERPL-SCNC: 103 MMOL/L (ref 98–107)
CO2 SERPL-SCNC: 24 MMOL/L (ref 22–29)
CREAT SERPL-MCNC: 1.04 MG/DL (ref 0.76–1.27)
DEPRECATED RDW RBC AUTO: 49.8 FL (ref 37–54)
EGFRCR SERPLBLD CKD-EPI 2021: 77.7 ML/MIN/1.73
EOSINOPHIL # BLD AUTO: 0.01 10*3/MM3 (ref 0–0.4)
EOSINOPHIL NFR BLD AUTO: 0.1 % (ref 0.3–6.2)
ERYTHROCYTE [DISTWIDTH] IN BLOOD BY AUTOMATED COUNT: 14.5 % (ref 12.3–15.4)
GLUCOSE BLDC GLUCOMTR-MCNC: 105 MG/DL (ref 70–105)
GLUCOSE BLDC GLUCOMTR-MCNC: 112 MG/DL (ref 70–105)
GLUCOSE SERPL-MCNC: 128 MG/DL (ref 65–99)
HCT VFR BLD AUTO: 43.8 % (ref 37.5–51)
HGB BLD-MCNC: 14.3 G/DL (ref 13–17.7)
IMM GRANULOCYTES # BLD AUTO: 0.11 10*3/MM3 (ref 0–0.05)
IMM GRANULOCYTES NFR BLD AUTO: 0.8 % (ref 0–0.5)
LYMPHOCYTES # BLD AUTO: 0.74 10*3/MM3 (ref 0.7–3.1)
LYMPHOCYTES NFR BLD AUTO: 5.3 % (ref 19.6–45.3)
MAGNESIUM SERPL-MCNC: 1.9 MG/DL (ref 1.6–2.4)
MCH RBC QN AUTO: 30.6 PG (ref 26.6–33)
MCHC RBC AUTO-ENTMCNC: 32.6 G/DL (ref 31.5–35.7)
MCV RBC AUTO: 93.6 FL (ref 79–97)
MONOCYTES # BLD AUTO: 0.29 10*3/MM3 (ref 0.1–0.9)
MONOCYTES NFR BLD AUTO: 2.1 % (ref 5–12)
NEUTROPHILS NFR BLD AUTO: 12.73 10*3/MM3 (ref 1.7–7)
NEUTROPHILS NFR BLD AUTO: 91.6 % (ref 42.7–76)
NRBC BLD AUTO-RTO: 0 /100 WBC (ref 0–0.2)
PHOSPHATE SERPL-MCNC: 3.2 MG/DL (ref 2.5–4.5)
PLATELET # BLD AUTO: 280 10*3/MM3 (ref 140–450)
PMV BLD AUTO: 10.4 FL (ref 6–12)
POTASSIUM SERPL-SCNC: 4.8 MMOL/L (ref 3.5–5.2)
RBC # BLD AUTO: 4.68 10*6/MM3 (ref 4.14–5.8)
SODIUM SERPL-SCNC: 140 MMOL/L (ref 136–145)
WBC NRBC COR # BLD AUTO: 13.9 10*3/MM3 (ref 3.4–10.8)

## 2024-04-27 PROCEDURE — 87102 FUNGUS ISOLATION CULTURE: CPT | Performed by: INTERNAL MEDICINE

## 2024-04-27 PROCEDURE — 82948 REAGENT STRIP/BLOOD GLUCOSE: CPT

## 2024-04-27 PROCEDURE — 99285 EMERGENCY DEPT VISIT HI MDM: CPT

## 2024-04-27 PROCEDURE — 84100 ASSAY OF PHOSPHORUS: CPT | Performed by: INTERNAL MEDICINE

## 2024-04-27 PROCEDURE — 80048 BASIC METABOLIC PNL TOTAL CA: CPT | Performed by: INTERNAL MEDICINE

## 2024-04-27 PROCEDURE — 73502 X-RAY EXAM HIP UNI 2-3 VIEWS: CPT

## 2024-04-27 PROCEDURE — 25010000002 HYDROMORPHONE 1 MG/ML SOLUTION: Performed by: INTERNAL MEDICINE

## 2024-04-27 PROCEDURE — 83735 ASSAY OF MAGNESIUM: CPT | Performed by: INTERNAL MEDICINE

## 2024-04-27 PROCEDURE — 85025 COMPLETE CBC W/AUTO DIFF WBC: CPT | Performed by: INTERNAL MEDICINE

## 2024-04-27 RX ORDER — PYRIDOSTIGMINE BROMIDE 180 MG/1
180 TABLET, EXTENDED RELEASE ORAL 2 TIMES DAILY
COMMUNITY

## 2024-04-27 RX ORDER — PANTOPRAZOLE SODIUM 40 MG/1
40 TABLET, DELAYED RELEASE ORAL
Status: DISCONTINUED | OUTPATIENT
Start: 2024-04-28 | End: 2024-05-03 | Stop reason: HOSPADM

## 2024-04-27 RX ORDER — OMEPRAZOLE 40 MG/1
40 CAPSULE, DELAYED RELEASE ORAL DAILY
COMMUNITY

## 2024-04-27 RX ORDER — PYRIDOSTIGMINE BROMIDE 180 MG/1
180 TABLET, EXTENDED RELEASE ORAL 2 TIMES DAILY
COMMUNITY
End: 2024-04-27

## 2024-04-27 RX ORDER — ATORVASTATIN CALCIUM 20 MG/1
20 TABLET, FILM COATED ORAL DAILY
Status: DISCONTINUED | OUTPATIENT
Start: 2024-04-27 | End: 2024-05-03 | Stop reason: HOSPADM

## 2024-04-27 RX ORDER — POLYETHYLENE GLYCOL 3350 17 G/17G
17 POWDER, FOR SOLUTION ORAL DAILY PRN
Status: DISCONTINUED | OUTPATIENT
Start: 2024-04-27 | End: 2024-04-30

## 2024-04-27 RX ORDER — IPRATROPIUM BROMIDE AND ALBUTEROL SULFATE 2.5; .5 MG/3ML; MG/3ML
3 SOLUTION RESPIRATORY (INHALATION) EVERY 4 HOURS PRN
Status: DISCONTINUED | OUTPATIENT
Start: 2024-04-27 | End: 2024-05-03 | Stop reason: HOSPADM

## 2024-04-27 RX ORDER — ZOLPIDEM TARTRATE 5 MG/1
10 TABLET ORAL NIGHTLY PRN
Status: DISCONTINUED | OUTPATIENT
Start: 2024-04-27 | End: 2024-05-03 | Stop reason: HOSPADM

## 2024-04-27 RX ORDER — ACETAMINOPHEN 325 MG/1
650 TABLET ORAL EVERY 4 HOURS PRN
Status: DISCONTINUED | OUTPATIENT
Start: 2024-04-27 | End: 2024-05-03 | Stop reason: HOSPADM

## 2024-04-27 RX ORDER — BISACODYL 10 MG
10 SUPPOSITORY, RECTAL RECTAL DAILY PRN
Status: DISCONTINUED | OUTPATIENT
Start: 2024-04-27 | End: 2024-04-30

## 2024-04-27 RX ORDER — INSULIN LISPRO 100 [IU]/ML
2-7 INJECTION, SOLUTION INTRAVENOUS; SUBCUTANEOUS
Status: DISCONTINUED | OUTPATIENT
Start: 2024-04-27 | End: 2024-05-03 | Stop reason: HOSPADM

## 2024-04-27 RX ORDER — INSULIN GLARGINE 100 [IU]/ML
10 INJECTION, SOLUTION SUBCUTANEOUS DAILY
COMMUNITY

## 2024-04-27 RX ORDER — ONDANSETRON 4 MG/1
4 TABLET, ORALLY DISINTEGRATING ORAL EVERY 6 HOURS PRN
Status: DISCONTINUED | OUTPATIENT
Start: 2024-04-27 | End: 2024-05-03 | Stop reason: HOSPADM

## 2024-04-27 RX ORDER — BUPROPION HYDROCHLORIDE 150 MG/1
300 TABLET ORAL EVERY MORNING
Status: DISCONTINUED | OUTPATIENT
Start: 2024-04-28 | End: 2024-05-03 | Stop reason: HOSPADM

## 2024-04-27 RX ORDER — NICOTINE POLACRILEX 4 MG
15 LOZENGE BUCCAL
Status: DISCONTINUED | OUTPATIENT
Start: 2024-04-27 | End: 2024-05-03 | Stop reason: HOSPADM

## 2024-04-27 RX ORDER — SERTRALINE HYDROCHLORIDE 100 MG/1
100 TABLET, FILM COATED ORAL EVERY MORNING
Status: DISCONTINUED | OUTPATIENT
Start: 2024-04-28 | End: 2024-05-03 | Stop reason: HOSPADM

## 2024-04-27 RX ORDER — SODIUM CHLORIDE 9 MG/ML
40 INJECTION, SOLUTION INTRAVENOUS AS NEEDED
Status: DISCONTINUED | OUTPATIENT
Start: 2024-04-27 | End: 2024-05-03 | Stop reason: HOSPADM

## 2024-04-27 RX ORDER — HYDROCODONE BITARTRATE AND ACETAMINOPHEN 5; 325 MG/1; MG/1
2 TABLET ORAL EVERY 6 HOURS PRN
Status: DISCONTINUED | OUTPATIENT
Start: 2024-04-27 | End: 2024-04-30

## 2024-04-27 RX ORDER — LISINOPRIL 40 MG/1
40 TABLET ORAL DAILY
COMMUNITY
Start: 2024-04-01 | End: 2024-05-03 | Stop reason: HOSPADM

## 2024-04-27 RX ORDER — AMOXICILLIN 250 MG
2 CAPSULE ORAL 2 TIMES DAILY PRN
Status: DISCONTINUED | OUTPATIENT
Start: 2024-04-27 | End: 2024-04-30

## 2024-04-27 RX ORDER — IBUPROFEN 600 MG/1
1 TABLET ORAL
Status: DISCONTINUED | OUTPATIENT
Start: 2024-04-27 | End: 2024-05-03 | Stop reason: HOSPADM

## 2024-04-27 RX ORDER — DEXTROSE MONOHYDRATE 25 G/50ML
25 INJECTION, SOLUTION INTRAVENOUS
Status: DISCONTINUED | OUTPATIENT
Start: 2024-04-27 | End: 2024-05-03 | Stop reason: HOSPADM

## 2024-04-27 RX ORDER — PREDNISONE 20 MG/1
20 TABLET ORAL DAILY
COMMUNITY

## 2024-04-27 RX ORDER — NITROGLYCERIN 0.4 MG/1
0.4 TABLET SUBLINGUAL
Status: DISCONTINUED | OUTPATIENT
Start: 2024-04-27 | End: 2024-05-03 | Stop reason: HOSPADM

## 2024-04-27 RX ORDER — ASPIRIN 81 MG/1
81 TABLET, COATED ORAL DAILY
COMMUNITY
Start: 2024-04-01

## 2024-04-27 RX ORDER — DAPAGLIFLOZIN 10 MG/1
10 TABLET, FILM COATED ORAL DAILY
COMMUNITY
Start: 2024-04-01

## 2024-04-27 RX ORDER — HYDROCODONE BITARTRATE AND ACETAMINOPHEN 5; 325 MG/1; MG/1
1 TABLET ORAL EVERY 6 HOURS PRN
Status: DISCONTINUED | OUTPATIENT
Start: 2024-04-27 | End: 2024-04-27

## 2024-04-27 RX ORDER — MELOXICAM 15 MG/1
15 TABLET ORAL DAILY
COMMUNITY
Start: 2024-03-08

## 2024-04-27 RX ORDER — SODIUM CHLORIDE 0.9 % (FLUSH) 0.9 %
10 SYRINGE (ML) INJECTION AS NEEDED
Status: DISCONTINUED | OUTPATIENT
Start: 2024-04-27 | End: 2024-05-03 | Stop reason: HOSPADM

## 2024-04-27 RX ORDER — BISACODYL 5 MG/1
5 TABLET, DELAYED RELEASE ORAL DAILY PRN
Status: DISCONTINUED | OUTPATIENT
Start: 2024-04-27 | End: 2024-04-30

## 2024-04-27 RX ORDER — ONDANSETRON 2 MG/ML
4 INJECTION INTRAMUSCULAR; INTRAVENOUS EVERY 6 HOURS PRN
Status: DISCONTINUED | OUTPATIENT
Start: 2024-04-27 | End: 2024-05-03 | Stop reason: HOSPADM

## 2024-04-27 RX ORDER — TRAZODONE HYDROCHLORIDE 100 MG/1
100 TABLET ORAL NIGHTLY
Status: DISCONTINUED | OUTPATIENT
Start: 2024-04-27 | End: 2024-05-03 | Stop reason: HOSPADM

## 2024-04-27 RX ORDER — SODIUM CHLORIDE 0.9 % (FLUSH) 0.9 %
10 SYRINGE (ML) INJECTION EVERY 12 HOURS SCHEDULED
Status: DISCONTINUED | OUTPATIENT
Start: 2024-04-27 | End: 2024-05-03 | Stop reason: HOSPADM

## 2024-04-27 RX ORDER — ZOLPIDEM TARTRATE 10 MG/1
10 TABLET ORAL NIGHTLY PRN
COMMUNITY
End: 2024-04-27

## 2024-04-27 RX ORDER — CHOLECALCIFEROL (VITAMIN D3) 125 MCG
5 CAPSULE ORAL NIGHTLY PRN
Status: DISCONTINUED | OUTPATIENT
Start: 2024-04-27 | End: 2024-05-03 | Stop reason: HOSPADM

## 2024-04-27 RX ORDER — ALUMINA, MAGNESIA, AND SIMETHICONE 2400; 2400; 240 MG/30ML; MG/30ML; MG/30ML
15 SUSPENSION ORAL EVERY 6 HOURS PRN
Status: DISCONTINUED | OUTPATIENT
Start: 2024-04-27 | End: 2024-05-03 | Stop reason: HOSPADM

## 2024-04-27 RX ADMIN — HYDROMORPHONE HYDROCHLORIDE 1 MG: 1 INJECTION, SOLUTION INTRAMUSCULAR; INTRAVENOUS; SUBCUTANEOUS at 17:36

## 2024-04-27 RX ADMIN — HYDROCODONE BITARTRATE AND ACETAMINOPHEN 1 TABLET: 5; 325 TABLET ORAL at 14:58

## 2024-04-27 RX ADMIN — TRAZODONE HYDROCHLORIDE 100 MG: 100 TABLET ORAL at 20:57

## 2024-04-27 RX ADMIN — ZOLPIDEM TARTRATE 10 MG: 5 TABLET ORAL at 20:59

## 2024-04-27 RX ADMIN — HYDROCODONE BITARTRATE AND ACETAMINOPHEN 2 TABLET: 5; 325 TABLET ORAL at 20:57

## 2024-04-27 RX ADMIN — Medication 10 ML: at 20:59

## 2024-04-27 RX ADMIN — ATORVASTATIN CALCIUM 20 MG: 20 TABLET, FILM COATED ORAL at 14:58

## 2024-04-27 NOTE — PLAN OF CARE
Goal Outcome Evaluation:                 Admit from ED. Will follow orders

## 2024-04-27 NOTE — H&P
History and Physical   Tha Barron : 1955 MRN:[id number] LOS:0     Reason for admission: Fracture     Assessment / Plan     #Mechanical fall complicated with right acetabular nondisplaced fracture  -Patient presented with mechanical fall and after that he heard popping sound and started having right hip pain.  -X-ray showing nondisplaced fracture of the medial right acetabulum.  -Orthopedic surgeon consulted.  -Pain management.  -Fall precaution.  -PT OT .  -To follow-up for lab test.  Pending for now.    #COPD, not in exacerbation  -Respiratory treatment oxygen supplement as needed    # Diabetes mellitus  -Accu-Chek SSI    Code Status (Patient has no pulse and is not breathing): CPR (Attempt to Resuscitate)  Medical Interventions (Patient has pulse or is breathing): Full Support       Nutrition: NPO Diet NPO Type: Strict NPO     DVT Prophylaxis:   Mechanical Order History:        Ordered        24 1334  Place Sequential Compression Device  Once            24 1334  Maintain Sequential Compression Device  Continuous                          Pharmalogical Order History:       None             History of Present illness     A 68 y.o. old male patient with PMH of COPD diabetes mellitus dyskinesia presents to the hospital with complaints of mechanical fall.  Does not have any headache dizziness blurred vision significant weakness prior to the event.  Patient does not hit the head.  Patient has a skin abrasion on the left forearm.  Other mechanical fall he heard a popping sound and started having right hip pain.  In the ED patient hemodynamically stable in the history showing there is nondisplaced fracture of the medial right acetabulum.  Patient has right hip arthroplasty many years ago.  Orthopedic surgeon consulted.     Patient will be admitted for right acetabulum fracture management.    Subjective / Review of systems     Review of Systems   And in the right hip.  No chest  pain shortness of breath abdominal pain nausea vomiting reported.      Past Medical/Surgical/Social/Family History & Allergies     Past Medical History:   Diagnosis Date    COPD (chronic obstructive pulmonary disease)     Diabetes mellitus     Tardive dyskinesia       Past Surgical History:   Procedure Laterality Date    CHOLECYSTECTOMY N/A 2/15/2023    Procedure: CHOLECYSTECTOMY LAPAROSCOPIC WITH DAVINCI ROBOT;  Surgeon: Tha Franklin MD;  Location: [facility] MAIN OR;  Service: Robotics - DaVinci;  Laterality: N/A;      Social History     Socioeconomic History    Marital status: Single   Tobacco Use    Smoking status: Every Day     Current packs/day: 1.00     Average packs/day: 1 pack/day for 40.0 years (40.0 ttl pk-yrs)     Types: Cigarettes    Smokeless tobacco: Never   Vaping Use    Vaping status: Never Used    Passive vaping exposure: Yes   Substance and Sexual Activity    Alcohol use: Never    Drug use: Never    Sexual activity: Not Currently      No family history on file.   No Known Allergies     Home Medications     Prior to Admission medications    Medication Sig Start Date End Date Taking? Authorizing Provider   Aspirin Low Dose 81 MG EC tablet Take 1 tablet by mouth Daily. 4/1/24  Yes Teodoro Brooks MD   atorvastatin (LIPITOR) 20 MG tablet Take 1 tablet by mouth Every Night. 1/20/23  Yes Teodoro Brooks MD   buPROPion XL (WELLBUTRIN XL) 300 MG 24 hr tablet Take 1 tablet by mouth Every Morning. 1/20/23  Yes Teodoro Brooks MD   Farxiga 10 MG tablet Take 10 mg by mouth Daily. 4/1/24  Yes Teodoro Brooks MD   insulin glargine (LANTUS, SEMGLEE) 100 UNIT/ML injection Inject 10 Units under the skin into the appropriate area as directed Daily.   Yes Teodoro Brooks MD   lisinopril (PRINIVIL,ZESTRIL) 40 MG tablet Take 1 tablet by mouth Daily. 4/1/24  Yes Teodoro Brooks MD   meloxicam (MOBIC) 15 MG tablet Take 1 tablet by mouth Daily. 3/8/24  Yes Teodoro Brooks  MD   metFORMIN (GLUCOPHAGE) 1000 MG tablet Take 1 tablet by mouth 2 (Two) Times a Day. 1/20/23  Yes Teodoro Brooks MD   omeprazole (priLOSEC) 40 MG capsule Take 1 capsule by mouth Daily.   Yes Teodoro Brooks MD   predniSONE (DELTASONE) 20 MG tablet Take 1 tablet by mouth Daily.   Yes Teodoro Brooks MD   pyridostigmine (MESTINON) 180 MG CR tablet Take 1 tablet by mouth 2 (Two) Times a Day.   Yes Teodoro Brooks MD   sertraline (ZOLOFT) 100 MG tablet Take 1 tablet by mouth Every Morning. 1/20/23  Yes Teodoro Brooks MD   traZODone (DESYREL) 100 MG tablet Take 2 tablets by mouth Every Night. 1/20/23  Yes Teodoro Brooks MD   Valbenazine Tosylate 80 MG capsule Take 80 mg by mouth Every Morning. 6/29/17  Yes Teodoro Brooks MD   zolpidem (AMBIEN) 10 MG tablet Take 1 tablet by mouth Every Night. 1/26/23  Yes Provider, Historical, MD   GNP Omeprazole 20 MG tablet delayed-release Take 40 mg by mouth 1 (One) Time. 1/5/23 4/27/24 Yes Teodoro Brooks MD   albuterol sulfate  (90 Base) MCG/ACT inhaler Inhale 2 puffs 4 (Four) Times a Day As Needed.  4/27/24  Teodoro Brooks MD   amoxicillin-clavulanate (Augmentin) 875-125 MG per tablet Take 1 tablet by mouth 2 (Two) Times a Day. 2/16/23 4/27/24  Emanuel Mckinney MD   Mestinon 180 MG CR tablet Take 1 tablet by mouth 2 (Two) Times a Day.  4/27/24  Teodoro Brooks MD   zolpidem (AMBIEN) 10 MG tablet Take 1 tablet by mouth At Night As Needed for Sleep.  4/27/24  Teodoro Brooks MD      Objective / Physical Exam   Vital signs:  Temp: 98.5 °F (36.9 °C)  BP: 119/65  Heart Rate: 81  Resp: 18  SpO2: 92 %  Weight: 125 kg (275 lb 9.2 oz)    Admission Weight: Weight: 125 kg (275 lb 9.2 oz)    Physical Exam   Physical Exam  HENT:      Head: Normocephalic and atraumatic.      Nose: Nose normal.   Eyes:      Extraocular Movements: Extraocular movements intact.      Conjunctivae/sclera: Conjunctivae normal.       "Pupils: Pupils are equal, round, and reactive to light.   Cardiovascular:      Rate and Rhythm: normal       Pulses: Normal pulses.      Heart sounds: Normal heart sounds.   Pulmonary:      Effort: normal      Breath sounds: normal   Abdominal:      General: Abdomen is flat. Bowel sounds are normal.      Palpations: Abdomen is soft.   Musculoskeletal:         Limited movement on the RLE   Tenderness in right hip   Skin:     General: Skin is dry.   Neurological:      General: No focal deficit present.      Mental Status: alert.   Psychiatric:         Mood and Affect: Mood normal.        Labs         Invalid input(s): \"HBG\"          Current Medications   Scheduled Meds:atorvastatin, 20 mg, Oral, Daily  [START ON 4/28/2024] buPROPion XL, 300 mg, Oral, QAM  insulin lispro, 2-7 Units, Subcutaneous, 4x Daily AC & at Bedtime  [START ON 4/28/2024] pantoprazole, 40 mg, Oral, Q AM  [START ON 4/28/2024] sertraline, 100 mg, Oral, QAM  sodium chloride, 10 mL, Intravenous, Q12H  traZODone, 100 mg, Oral, Nightly         Continuous Infusions:      Gillian Leal MD  [facility] Medicine   04/27/24   13:59 EDT       "

## 2024-04-27 NOTE — ED PROVIDER NOTES
Subjective   History of Present Illness  68-year-old male presents after slipped states did the splits and fell.  He complains pain to right hip if he tries to raise it or bear weight.  He has history of previous replacement about 12 years ago.  He has no complaints of head neck chest abdomen back or other extremity pain other than minor abrasion to arm.  Review of Systems    Past Medical History:   Diagnosis Date    COPD (chronic obstructive pulmonary disease)     Diabetes mellitus     Tardive dyskinesia        No Known Allergies    Past Surgical History:   Procedure Laterality Date    CHOLECYSTECTOMY N/A 2/15/2023    Procedure: CHOLECYSTECTOMY LAPAROSCOPIC WITH DAVINCI ROBOT;  Surgeon: Tha Franklin MD;  Location: [facility] MAIN OR;  Service: Robotics - DaVinci;  Laterality: N/A;       No family history on file.    Social History     Socioeconomic History    Marital status: Single   Tobacco Use    Smoking status: Every Day     Current packs/day: 1.00     Average packs/day: 1 pack/day for 40.0 years (40.0 ttl pk-yrs)     Types: Cigarettes    Smokeless tobacco: Never   Vaping Use    Vaping status: Never Used    Passive vaping exposure: Yes   Substance and Sexual Activity    Alcohol use: Never    Drug use: Never    Sexual activity: Not Currently     Prior to Admission medications    Medication Sig Start Date End Date Taking? Authorizing Provider   albuterol sulfate  (90 Base) MCG/ACT inhaler Inhale 2 puffs 4 (Four) Times a Day As Needed.    Teodoro Brooks MD   amoxicillin-clavulanate (Augmentin) 875-125 MG per tablet Take 1 tablet by mouth 2 (Two) Times a Day. 2/16/23   Emanuel Mckinney MD   atorvastatin (LIPITOR) 10 MG tablet Take 10 mg by mouth 1 (One) Time. 1/20/23   Teodoro Brooks MD   buPROPion XL (WELLBUTRIN XL) 300 MG 24 hr tablet Take 300 mg by mouth Every Morning. 1/20/23   Provider, Historical, MD   GNP Omeprazole 20 MG tablet delayed-release Take 20 mg by mouth 1 (One)  Time. 1/5/23   Teodoro Brooks MD   metFORMIN (GLUCOPHAGE) 500 MG tablet Take 500 mg by mouth 2 (Two) Times a Day. 1/20/23   Teodoro Brooks MD   sertraline (ZOLOFT) 100 MG tablet Take 100 mg by mouth Every Morning. 1/20/23   Teodoro Brooks MD   traZODone (DESYREL) 100 MG tablet Take 100 mg by mouth Every Night. 1/20/23   Teodoro Brooks MD   Valbenazine Tosylate (Ingrezza) 40 MG capsule Take 40 mg by mouth Every Morning. 6/29/17   Teodoro Brooks MD   zolpidem (AMBIEN) 10 MG tablet Take 10 mg by mouth Every Night. 1/26/23   Teodoro Brooks MD           Objective   Physical Exam  68-year-old male awake alert.  Generally overweight.  No complaints of head neck chest abdomen or back pain.  Examination of arm does minor abrasion over dorsal forearm.  He has full range of motion no bony tenderness.  Examination of legs he has no complaints of pain with palpation or movement of left hip.  He does complain of pain with trying to raise her right hip.  He complains of mild pain with internal/external rotation knee is nontender and he is neuro vasc intact distally  Procedures           ED Course                                             Medical Decision Making  Amount and/or Complexity of Data Reviewed  Radiology: ordered.    Chart review: Patient had cholecystectomy February of last year with Dr. Franklin  Comorbidity: As per past history   Differential: Fracture, contusion, strain, clinically dislocation not present  My EKG interpretation: Not indicated  Lab: Not indicated  My Radiology review and interpretation: X-ray of right hip and pelvis reveals a nondisplaced fracture medial right acetabulum.  There is right hip arthroplasty that appears appropriately positioned without hardware complication.  Discussion/treatment: Patient's findings were discussed with him.  He was discussed with Dr. Parker orthopedics.  Patient will require admission for rehab.  He is currently in assisted  living has stairs and is not able to be nonweightbearing in his current living situation.  Patient was discussed with the Doctor Elvis. He Will be admitted to hospitalist service for continued care.  Patient was evaluated using appropriate PPE      Final diagnoses:   Closed nondisplaced fracture of medial wall of right acetabulum, initial encounter       ED Disposition  ED Disposition       ED Disposition   Decision to Admit    Condition   --    Comment   Level of Care: Med/Surg [1]   Admitting Physician: ANA M MATTHEWS [[id number]]                 No follow-up provider specified.       Medication List      No changes were made to your prescriptions during this visit.            Corona Hernandez MD  04/27/24 6014

## 2024-04-27 NOTE — LETTER
EMS Transport Request  For use at [facility], [place], [place], [place], and [place] only   Patient Name: Tha Barron : 1955   Weight:125 kg (275 lb 9.2 oz) Pick-up Location: [facility] BLS/ALS: BLS/ALS: BLS   Insurance: [employer] Auth End Date: 24   Pre-Cert #: D/C Summary complete:    Destination: [address] [place] [address]   Contact Precautions: None   Equipment (O2, Fluids, etc.): None   Arrive By Date/Time: 2024 1645 Stretcher/WC: Wheelchair   CM Requesting: Sheela Ruiz RN Ext: [phone]   Notes/Medical Necessity: needs w/c van to snf.  NWB on right lower extremity but able to sit in w/c for transport      ______________________________________________________________________    *Only 2 patient bags OR 1 carry-on size bag are permitted.  Wheelchairs and walkers CANNOT transported with the patient. Acknowledge: Yes

## 2024-04-28 ENCOUNTER — APPOINTMENT (OUTPATIENT)
Dept: GENERAL RADIOLOGY | Facility: HOSPITAL | Age: 69
End: 2024-04-28
Payer: MEDICAID

## 2024-04-28 LAB
ANION GAP SERPL CALCULATED.3IONS-SCNC: 7 MMOL/L (ref 5–15)
BASOPHILS # BLD AUTO: 0.03 10*3/MM3 (ref 0–0.2)
BASOPHILS # BLD AUTO: 0.05 10*3/MM3 (ref 0–0.2)
BASOPHILS NFR BLD AUTO: 0.3 % (ref 0–1.5)
BASOPHILS NFR BLD AUTO: 0.5 % (ref 0–1.5)
BUN SERPL-MCNC: 19 MG/DL (ref 8–23)
BUN/CREAT SERPL: 23.2 (ref 7–25)
CALCIUM SPEC-SCNC: 9.1 MG/DL (ref 8.6–10.5)
CHLORIDE SERPL-SCNC: 103 MMOL/L (ref 98–107)
CO2 SERPL-SCNC: 28 MMOL/L (ref 22–29)
CREAT SERPL-MCNC: 0.82 MG/DL (ref 0.76–1.27)
DACRYOCYTES BLD QL SMEAR: NORMAL
DEPRECATED RDW RBC AUTO: 49.9 FL (ref 37–54)
DEPRECATED RDW RBC AUTO: 51.3 FL (ref 37–54)
EGFRCR SERPLBLD CKD-EPI 2021: 95.1 ML/MIN/1.73
EOSINOPHIL # BLD AUTO: 0.06 10*3/MM3 (ref 0–0.4)
EOSINOPHIL # BLD AUTO: 0.14 10*3/MM3 (ref 0–0.4)
EOSINOPHIL NFR BLD AUTO: 0.6 % (ref 0.3–6.2)
EOSINOPHIL NFR BLD AUTO: 1.3 % (ref 0.3–6.2)
ERYTHROCYTE [DISTWIDTH] IN BLOOD BY AUTOMATED COUNT: 14.5 % (ref 12.3–15.4)
ERYTHROCYTE [DISTWIDTH] IN BLOOD BY AUTOMATED COUNT: 14.6 % (ref 12.3–15.4)
GLUCOSE BLDC GLUCOMTR-MCNC: 101 MG/DL (ref 70–105)
GLUCOSE BLDC GLUCOMTR-MCNC: 108 MG/DL (ref 70–105)
GLUCOSE BLDC GLUCOMTR-MCNC: 139 MG/DL (ref 70–105)
GLUCOSE BLDC GLUCOMTR-MCNC: 92 MG/DL (ref 70–105)
GLUCOSE SERPL-MCNC: 123 MG/DL (ref 65–99)
HCT VFR BLD AUTO: 40.8 % (ref 37.5–51)
HCT VFR BLD AUTO: 42.8 % (ref 37.5–51)
HGB BLD-MCNC: 13 G/DL (ref 13–17.7)
HGB BLD-MCNC: 13.7 G/DL (ref 13–17.7)
IMM GRANULOCYTES # BLD AUTO: 0.05 10*3/MM3 (ref 0–0.05)
IMM GRANULOCYTES # BLD AUTO: 0.07 10*3/MM3 (ref 0–0.05)
IMM GRANULOCYTES NFR BLD AUTO: 0.5 % (ref 0–0.5)
IMM GRANULOCYTES NFR BLD AUTO: 0.6 % (ref 0–0.5)
LYMPHOCYTES # BLD AUTO: 2.01 10*3/MM3 (ref 0.7–3.1)
LYMPHOCYTES # BLD AUTO: 2.08 10*3/MM3 (ref 0.7–3.1)
LYMPHOCYTES NFR BLD AUTO: 18.3 % (ref 19.6–45.3)
LYMPHOCYTES NFR BLD AUTO: 19.1 % (ref 19.6–45.3)
MAGNESIUM SERPL-MCNC: 2.2 MG/DL (ref 1.6–2.4)
MCH RBC QN AUTO: 29.7 PG (ref 26.6–33)
MCH RBC QN AUTO: 30.3 PG (ref 26.6–33)
MCHC RBC AUTO-ENTMCNC: 31.9 G/DL (ref 31.5–35.7)
MCHC RBC AUTO-ENTMCNC: 32 G/DL (ref 31.5–35.7)
MCV RBC AUTO: 93.4 FL (ref 79–97)
MCV RBC AUTO: 94.7 FL (ref 79–97)
MONOCYTES # BLD AUTO: 0.5 10*3/MM3 (ref 0.1–0.9)
MONOCYTES # BLD AUTO: 0.59 10*3/MM3 (ref 0.1–0.9)
MONOCYTES NFR BLD AUTO: 4.6 % (ref 5–12)
MONOCYTES NFR BLD AUTO: 5.4 % (ref 5–12)
NEUTROPHILS NFR BLD AUTO: 74 % (ref 42.7–76)
NEUTROPHILS NFR BLD AUTO: 74.8 % (ref 42.7–76)
NEUTROPHILS NFR BLD AUTO: 8.13 10*3/MM3 (ref 1.7–7)
NEUTROPHILS NFR BLD AUTO: 8.16 10*3/MM3 (ref 1.7–7)
NRBC BLD AUTO-RTO: 0 /100 WBC (ref 0–0.2)
NRBC BLD AUTO-RTO: 0 /100 WBC (ref 0–0.2)
PHOSPHATE SERPL-MCNC: 4.1 MG/DL (ref 2.5–4.5)
PLATELET # BLD AUTO: 259 10*3/MM3 (ref 140–450)
PLATELET # BLD AUTO: 262 10*3/MM3 (ref 140–450)
PMV BLD AUTO: 10.3 FL (ref 6–12)
PMV BLD AUTO: 9.9 FL (ref 6–12)
POIKILOCYTOSIS BLD QL SMEAR: NORMAL
POTASSIUM SERPL-SCNC: 4.3 MMOL/L (ref 3.5–5.2)
RBC # BLD AUTO: 4.37 10*6/MM3 (ref 4.14–5.8)
RBC # BLD AUTO: 4.52 10*6/MM3 (ref 4.14–5.8)
SMALL PLATELETS BLD QL SMEAR: ADEQUATE
SODIUM SERPL-SCNC: 138 MMOL/L (ref 136–145)
WBC MORPH BLD: NORMAL
WBC NRBC COR # BLD AUTO: 10.87 10*3/MM3 (ref 3.4–10.8)
WBC NRBC COR # BLD AUTO: 11 10*3/MM3 (ref 3.4–10.8)

## 2024-04-28 PROCEDURE — 25010000002 HYDROMORPHONE 1 MG/ML SOLUTION: Performed by: INTERNAL MEDICINE

## 2024-04-28 PROCEDURE — 85025 COMPLETE CBC W/AUTO DIFF WBC: CPT | Performed by: HOSPITALIST

## 2024-04-28 PROCEDURE — 84100 ASSAY OF PHOSPHORUS: CPT | Performed by: INTERNAL MEDICINE

## 2024-04-28 PROCEDURE — 80048 BASIC METABOLIC PNL TOTAL CA: CPT | Performed by: HOSPITALIST

## 2024-04-28 PROCEDURE — 93005 ELECTROCARDIOGRAM TRACING: CPT | Performed by: ORTHOPAEDIC SURGERY

## 2024-04-28 PROCEDURE — 93010 ELECTROCARDIOGRAM REPORT: CPT | Performed by: INTERNAL MEDICINE

## 2024-04-28 PROCEDURE — 82948 REAGENT STRIP/BLOOD GLUCOSE: CPT

## 2024-04-28 PROCEDURE — 71045 X-RAY EXAM CHEST 1 VIEW: CPT

## 2024-04-28 PROCEDURE — 82948 REAGENT STRIP/BLOOD GLUCOSE: CPT | Performed by: INTERNAL MEDICINE

## 2024-04-28 PROCEDURE — 83735 ASSAY OF MAGNESIUM: CPT | Performed by: INTERNAL MEDICINE

## 2024-04-28 PROCEDURE — 85007 BL SMEAR W/DIFF WBC COUNT: CPT | Performed by: HOSPITALIST

## 2024-04-28 RX ORDER — LISINOPRIL 20 MG/1
40 TABLET ORAL DAILY
Status: DISCONTINUED | OUTPATIENT
Start: 2024-04-28 | End: 2024-04-29

## 2024-04-28 RX ADMIN — HYDROMORPHONE HYDROCHLORIDE 1 MG: 1 INJECTION, SOLUTION INTRAMUSCULAR; INTRAVENOUS; SUBCUTANEOUS at 21:40

## 2024-04-28 RX ADMIN — Medication 10 ML: at 21:37

## 2024-04-28 RX ADMIN — Medication 10 ML: at 08:15

## 2024-04-28 RX ADMIN — HYDROMORPHONE HYDROCHLORIDE 1 MG: 1 INJECTION, SOLUTION INTRAMUSCULAR; INTRAVENOUS; SUBCUTANEOUS at 13:30

## 2024-04-28 RX ADMIN — SERTRALINE 100 MG: 100 TABLET, FILM COATED ORAL at 06:55

## 2024-04-28 RX ADMIN — BUPROPION HYDROCHLORIDE 300 MG: 150 TABLET, EXTENDED RELEASE ORAL at 06:55

## 2024-04-28 RX ADMIN — HYDROCODONE BITARTRATE AND ACETAMINOPHEN 2 TABLET: 5; 325 TABLET ORAL at 23:32

## 2024-04-28 RX ADMIN — HYDROCODONE BITARTRATE AND ACETAMINOPHEN 2 TABLET: 5; 325 TABLET ORAL at 05:25

## 2024-04-28 RX ADMIN — TRAZODONE HYDROCHLORIDE 100 MG: 100 TABLET ORAL at 21:37

## 2024-04-28 RX ADMIN — EMPAGLIFLOZIN 10 MG: 10 TABLET, FILM COATED ORAL at 10:33

## 2024-04-28 RX ADMIN — LISINOPRIL 40 MG: 20 TABLET ORAL at 10:33

## 2024-04-28 RX ADMIN — ZOLPIDEM TARTRATE 10 MG: 5 TABLET ORAL at 21:37

## 2024-04-28 RX ADMIN — ATORVASTATIN CALCIUM 20 MG: 20 TABLET, FILM COATED ORAL at 08:15

## 2024-04-28 RX ADMIN — PANTOPRAZOLE SODIUM 40 MG: 40 TABLET, DELAYED RELEASE ORAL at 05:25

## 2024-04-28 RX ADMIN — HYDROMORPHONE HYDROCHLORIDE 1 MG: 1 INJECTION, SOLUTION INTRAMUSCULAR; INTRAVENOUS; SUBCUTANEOUS at 08:18

## 2024-04-28 RX ADMIN — HYDROCODONE BITARTRATE AND ACETAMINOPHEN 2 TABLET: 5; 325 TABLET ORAL at 17:52

## 2024-04-28 NOTE — PLAN OF CARE
Goal Outcome Evaluation:              Outcome Evaluation: Pt stable this shift, pt cont to c/o pain in RLE/hip, pain treated per MAR. No s/sx of distress observed. Pt VSS call light in reach, plan of care on going.

## 2024-04-28 NOTE — PROGRESS NOTES
[facility] MEDICINE SERVICE  DAILY PROGRESS NOTE    NAME: Tha Barron  : 1955  MRN: [id number]      LOS: 1 day     PROVIDER OF SERVICE: Timothy Duane Brammell, MD    Chief Complaint: Fracture    Subjective:     Interval History:  History taken from: patient  Patient Complaints: Patient with some pain with attempts of movement.  He denies any other issues other than his pain.  He denies any shortness of breath or gastric bowel or urinary issues.      Review of Systems:   Review of Systems   All other systems reviewed and are negative.      Objective:     Vital Signs  Temp:  [97.7 °F (36.5 °C)-98.5 °F (36.9 °C)] 98.1 °F (36.7 °C)  Heart Rate:  [55-81] 55  Resp:  [16-20] 18  BP: (100-119)/(59-78) 110/72   Body mass index is 34.44 kg/m².    Physical Exam  Physical Exam  Constitutional:       Appearance: Normal appearance.   HENT:      Head: Normocephalic and atraumatic.   Cardiovascular:      Rate and Rhythm: Normal rate and regular rhythm.      Comments: Dorsalis pedis pulse worse on the right and left.  Pulmonary:      Effort: Pulmonary effort is normal.      Breath sounds: Normal breath sounds.   Skin:     Comments: Cyanosis of the digits of the feet bilaterally.   Neurological:      Mental Status: He is alert.         Scheduled Meds   atorvastatin, 20 mg, Oral, Daily  buPROPion XL, 300 mg, Oral, QAM  empagliflozin, 10 mg, Oral, Daily  insulin lispro, 2-7 Units, Subcutaneous, 4x Daily AC & at Bedtime  lisinopril, 40 mg, Oral, Daily  pantoprazole, 40 mg, Oral, Q AM  sertraline, 100 mg, Oral, QAM  sodium chloride, 10 mL, Intravenous, Q12H  traZODone, 100 mg, Oral, Nightly       PRN Meds     acetaminophen    aluminum-magnesium hydroxide-simethicone    senna-docusate sodium **AND** polyethylene glycol **AND** bisacodyl **AND** bisacodyl    Calcium Replacement - Follow Nurse / BPA Driven Protocol    dextrose    dextrose    dextrose    dextrose    glucagon (human recombinant)     HYDROcodone-acetaminophen    HYDROmorphone    ipratropium-albuterol    Magnesium Standard Dose Replacement - Follow Nurse / BPA Driven Protocol    melatonin    nitroglycerin    ondansetron ODT **OR** ondansetron    Phosphorus Replacement - Follow Nurse / BPA Driven Protocol    Potassium Replacement - Follow Nurse / BPA Driven Protocol    sodium chloride    sodium chloride    zolpidem   Infusions         Diagnostic Data    Results from last 7 days   Lab Units 04/27/24  2358   WBC 10*3/mm3 10.87*   HEMOGLOBIN g/dL 13.0   HEMATOCRIT % 40.8   PLATELETS 10*3/mm3 259   GLUCOSE mg/dL 123*   CREATININE mg/dL 0.82   BUN mg/dL 19   SODIUM mmol/L 138   POTASSIUM mmol/L 4.3   ANION GAP mmol/L 7.0       XR Chest 1 View    Result Date: 4/28/2024  Impression: 1. No acute cardiopulmonary abnormality. No acute change from prior exam. Electronically Signed: Giuseppe Young  4/28/2024 10:41 AM EDT  Workstation ID: [id number]    XR Hip With or Without Pelvis 2 - 3 View Right    Result Date: 4/27/2024  Impression: 1.Nondisplaced fracture of the medial right acetabulum. 2.Status post right hip arthroplasty. No definite acute hardware complication. Electronically Signed: Asim Yin  4/27/2024 12:53 PM EDT  Workstation ID: [id number]       None    Assessmen:    1.  Nondisplaced right hip fracture  2.  History of COPD  3.  Type 2 diabetes   4.  Hypertension    Plan.  Patient will be seen in evaluation by physical therapy.  He will be assessed as to his rehab needs.  Symptomatic pain control    Active and Resolved Problems  Active Hospital Problems    Diagnosis  POA    **Fracture [T14.8XXA]  Yes      Resolved Hospital Problems   No resolved problems to display.           DVT prophylaxis:  Mechanical DVT prophylaxis orders are present.         Code status is   Code Status and Medical Interventions:   Ordered at: 04/27/24 4029     Code Status (Patient has no pulse and is not breathing):    CPR (Attempt to Resuscitate)     Medical Interventions  (Patient has pulse or is breathing):    Full Support       Plan for disposition:Rehab in 1 days    Time: 30 minutes    Signature: Electronically signed by Timothy Duane Brammell, MD, 04/28/24, 12:38 EDT.  [facility]

## 2024-04-28 NOTE — PLAN OF CARE
Goal Outcome Evaluation:  Patient's pain controlled with IV/PO pain medications, Norco adjusted at beginning of shift due to report of Norco 5mg not effective, patient awaiting ortho consult, encouraged to turn and reposition, patient wanting to maintain tilted right position for comfort due to fracture, educated on skin injury risk

## 2024-04-28 NOTE — CONSULTS
Orthopaedic Consultation      Patient: Tha Barron    Date of Admission: 4/27/2024 12:12 PM    YOB: 1955    Medical Record Number: [id number]    Attending Physician:  Brammell, Timothy Duane,*    Consulting Physician:  Justino Parker MD        Chief Complaints: Right hip acetabular fracture    History of Present Illness: 68 y.o. male admitted to [facility] with right hip acetabular fracture. I was consulted for further evaluation and treatment. Onset of symptoms was abrupt starting 1 day ago.  Symptoms are associated with right hip pain.  Symptoms are aggravated by motion.   Symptoms improve with rest.  The patient reports that he fell onto the right hip yesterday.  He had difficulty bearing weight.  He lives in a group home with a lot of steps.  He had a right hip replacement performed about 12 years ago.  He denies any problems with the hip before the fall.    Allergies: No Known Allergies    Medications:   Home Medications:  Medications Prior to Admission   Medication Sig Dispense Refill Last Dose    Aspirin Low Dose 81 MG EC tablet Take 1 tablet by mouth Daily.       atorvastatin (LIPITOR) 20 MG tablet Take 1 tablet by mouth Every Night.   4/27/2024    buPROPion XL (WELLBUTRIN XL) 300 MG 24 hr tablet Take 1 tablet by mouth Every Morning.   4/27/2024    Farxiga 10 MG tablet Take 10 mg by mouth Daily.       insulin glargine (LANTUS, SEMGLEE) 100 UNIT/ML injection Inject 10 Units under the skin into the appropriate area as directed Daily.       lisinopril (PRINIVIL,ZESTRIL) 40 MG tablet Take 1 tablet by mouth Daily.       meloxicam (MOBIC) 15 MG tablet Take 1 tablet by mouth Daily.       metFORMIN (GLUCOPHAGE) 1000 MG tablet Take 1 tablet by mouth 2 (Two) Times a Day.   4/27/2024    omeprazole (priLOSEC) 40 MG capsule Take 1 capsule by mouth Daily.       predniSONE (DELTASONE) 20 MG tablet Take 1 tablet by mouth Daily.       pyridostigmine (MESTINON) 180 MG CR tablet Take 1  tablet by mouth 2 (Two) Times a Day.       sertraline (ZOLOFT) 100 MG tablet Take 1 tablet by mouth Every Morning.   4/27/2024    traZODone (DESYREL) 100 MG tablet Take 2 tablets by mouth Every Night.   4/26/2024    Valbenazine Tosylate 80 MG capsule Take 80 mg by mouth Every Morning.   4/27/2024    zolpidem (AMBIEN) 10 MG tablet Take 1 tablet by mouth Every Night.   4/26/2024       Current Medications:  Scheduled Meds:atorvastatin, 20 mg, Oral, Daily  buPROPion XL, 300 mg, Oral, QAM  empagliflozin, 10 mg, Oral, Daily  insulin lispro, 2-7 Units, Subcutaneous, 4x Daily AC & at Bedtime  lisinopril, 40 mg, Oral, Daily  pantoprazole, 40 mg, Oral, Q AM  sertraline, 100 mg, Oral, QAM  sodium chloride, 10 mL, Intravenous, Q12H  traZODone, 100 mg, Oral, Nightly      Continuous Infusions:   PRN Meds:.  acetaminophen    aluminum-magnesium hydroxide-simethicone    senna-docusate sodium **AND** polyethylene glycol **AND** bisacodyl **AND** bisacodyl    Calcium Replacement - Follow Nurse / BPA Driven Protocol    dextrose    dextrose    dextrose    dextrose    glucagon (human recombinant)    HYDROcodone-acetaminophen    HYDROmorphone    ipratropium-albuterol    Magnesium Standard Dose Replacement - Follow Nurse / BPA Driven Protocol    melatonin    nitroglycerin    ondansetron ODT **OR** ondansetron    Phosphorus Replacement - Follow Nurse / BPA Driven Protocol    Potassium Replacement - Follow Nurse / BPA Driven Protocol    sodium chloride    sodium chloride    zolpidem    Past Medical History:   Diagnosis Date    COPD (chronic obstructive pulmonary disease)     Diabetes mellitus     Myasthenia gravis     Tardive dyskinesia      Past Surgical History:   Procedure Laterality Date    CHOLECYSTECTOMY N/A 2/15/2023    Procedure: CHOLECYSTECTOMY LAPAROSCOPIC WITH DAVINCI ROBOT;  Surgeon: Tha Franklin MD;  Location: [facility] OR;  Service: Robotics - DaVinci;  Laterality: N/A;     Social History     Occupational History     Not on file   Tobacco Use    Smoking status: Every Day     Current packs/day: 1.00     Average packs/day: 1 pack/day for 40.0 years (40.0 ttl pk-yrs)     Types: Cigarettes    Smokeless tobacco: Never   Vaping Use    Vaping status: Never Used    Passive vaping exposure: Yes   Substance and Sexual Activity    Alcohol use: Never    Drug use: Never    Sexual activity: Not Currently      Social History     Social History Narrative    Not on file     History reviewed. No pertinent family history.      Review of Systems:   No other pertinent positives or negatives other than what is mentioned in the HPI and below.  Constitutional: Negative for fatigue, fever, or weight loss  HEENT: No active headache.  Pulmonary: Patient denies SOA.  Cardiovascular: Patient denies any chest pain.  Gastrointestinal:  Patient denies active vomiting or diarrhea.  Musculoskeletal: Positive for right hip pain.  Neurological: Patient denies active dizziness or loss of consciousness.  Skin: Patient denies any active bleeding.    Vital signs in last 24 hours:  Temp:  [97.7 °F (36.5 °C)-98.5 °F (36.9 °C)] 98.3 °F (36.8 °C)  Heart Rate:  [55-81] 55  Resp:  [16-20] 18  BP: (100-129)/(59-78) 113/77  Vitals:    04/27/24 2006 04/28/24 0023 04/28/24 0500 04/28/24 0833   BP: 111/65 107/67 116/66 113/77   BP Location: Right arm Right arm Right arm Right arm   Patient Position: Sitting Sitting Lying Lying   Pulse: 62 58 55    Resp: 16 17 16 18   Temp: 98.3 °F (36.8 °C) 97.8 °F (36.6 °C) 97.7 °F (36.5 °C) 98.3 °F (36.8 °C)   TempSrc: Oral Oral Oral Oral   SpO2: 91% 93% 91% 92%   Weight:       Height:              Physical Exam: 68 y.o. male         General Appearance:  Alert, cooperative, in no acute distress    HEENT:    Atraumatic, Pupils are equal   Neck:   Cervical spine midline, no appreciable JVD   Lungs:     Breathing non-labored and chest rise symmetric    Heart:   Abdomen:     Rectal:       Extremities:   Pulses  Neurovascular:   Skin:    Musculoskeletal:      Pulse regular    Soft, Non-tender or distended    Deferred        No clubbing, cyanosis, or edema    Intact    Cranial nerves 2 - 12 grossly intact, sensation intact    No skin lesions  Examination of right hip reveals tenderness to palpation.  Very slight pain with logroll.  Tolerates gentle motion of the knee and ankle.  Foot warm and well-perfused.  Normal motor and sensory exam.  Compartments are soft.  No appreciable skin lesions.     Diagnostic Tests:    Results from last 7 days   Lab Units 04/27/24  2358   WBC 10*3/mm3 10.87*   HEMOGLOBIN g/dL 13.0   HEMATOCRIT % 40.8   PLATELETS 10*3/mm3 259     Results from last 7 days   Lab Units 04/27/24  2358   SODIUM mmol/L 138   POTASSIUM mmol/L 4.3   CHLORIDE mmol/L 103   CO2 mmol/L 28.0   BUN mg/dL 19   CREATININE mg/dL 0.82   GLUCOSE mg/dL 123*   CALCIUM mg/dL 9.1         XR Hip With or Without Pelvis 2 - 3 View Right    Result Date: 4/27/2024  Impression: 1.Nondisplaced fracture of the medial right acetabulum. 2.Status post right hip arthroplasty. No definite acute hardware complication. Electronically Signed: Asim Yin  4/27/2024 12:53 PM EDT  Workstation ID: [id number]       Assessment:    Fracture  Right hip nondisplaced acetabular fracture    Plan: I discussed the imaging findings with the patient.  I recommend conservative nonoperative management.  He will be nonweightbearing on the right lower extremity for 4 to 6 weeks pending recovery.  He voiced understanding.  I would like to see him back in the office in 3 to 4 weeks.  He agreed.  All of his questions answered.  He will likely need rehab.  Please call with any questions.    Date: 4/28/2024  Justino Parker MD

## 2024-04-29 ENCOUNTER — APPOINTMENT (OUTPATIENT)
Dept: CT IMAGING | Facility: HOSPITAL | Age: 69
End: 2024-04-29
Payer: MEDICAID

## 2024-04-29 LAB
ANION GAP SERPL CALCULATED.3IONS-SCNC: 11 MMOL/L (ref 5–15)
BASOPHILS # BLD AUTO: 0.04 10*3/MM3 (ref 0–0.2)
BASOPHILS NFR BLD AUTO: 0.3 % (ref 0–1.5)
BUN SERPL-MCNC: 17 MG/DL (ref 8–23)
BUN/CREAT SERPL: 19.5 (ref 7–25)
CALCIUM SPEC-SCNC: 9.2 MG/DL (ref 8.6–10.5)
CHLORIDE SERPL-SCNC: 99 MMOL/L (ref 98–107)
CO2 SERPL-SCNC: 24 MMOL/L (ref 22–29)
CREAT SERPL-MCNC: 0.87 MG/DL (ref 0.76–1.27)
DEPRECATED RDW RBC AUTO: 49.1 FL (ref 37–54)
EGFRCR SERPLBLD CKD-EPI 2021: 93.4 ML/MIN/1.73
EOSINOPHIL # BLD AUTO: 0.17 10*3/MM3 (ref 0–0.4)
EOSINOPHIL NFR BLD AUTO: 1.4 % (ref 0.3–6.2)
ERYTHROCYTE [DISTWIDTH] IN BLOOD BY AUTOMATED COUNT: 14.4 % (ref 12.3–15.4)
GLUCOSE BLDC GLUCOMTR-MCNC: 121 MG/DL (ref 70–105)
GLUCOSE BLDC GLUCOMTR-MCNC: 122 MG/DL (ref 70–105)
GLUCOSE BLDC GLUCOMTR-MCNC: 128 MG/DL (ref 70–105)
GLUCOSE BLDC GLUCOMTR-MCNC: 129 MG/DL (ref 70–105)
GLUCOSE SERPL-MCNC: 109 MG/DL (ref 65–99)
HCT VFR BLD AUTO: 42.8 % (ref 37.5–51)
HGB BLD-MCNC: 14 G/DL (ref 13–17.7)
IMM GRANULOCYTES # BLD AUTO: 0.05 10*3/MM3 (ref 0–0.05)
IMM GRANULOCYTES NFR BLD AUTO: 0.4 % (ref 0–0.5)
LYMPHOCYTES # BLD AUTO: 2.07 10*3/MM3 (ref 0.7–3.1)
LYMPHOCYTES NFR BLD AUTO: 17 % (ref 19.6–45.3)
MAGNESIUM SERPL-MCNC: 1.8 MG/DL (ref 1.6–2.4)
MCH RBC QN AUTO: 30.4 PG (ref 26.6–33)
MCHC RBC AUTO-ENTMCNC: 32.7 G/DL (ref 31.5–35.7)
MCV RBC AUTO: 92.8 FL (ref 79–97)
MONOCYTES # BLD AUTO: 0.7 10*3/MM3 (ref 0.1–0.9)
MONOCYTES NFR BLD AUTO: 5.7 % (ref 5–12)
NEUTROPHILS NFR BLD AUTO: 75.2 % (ref 42.7–76)
NEUTROPHILS NFR BLD AUTO: 9.16 10*3/MM3 (ref 1.7–7)
NRBC BLD AUTO-RTO: 0 /100 WBC (ref 0–0.2)
PHOSPHATE SERPL-MCNC: 4.4 MG/DL (ref 2.5–4.5)
PLATELET # BLD AUTO: 280 10*3/MM3 (ref 140–450)
PMV BLD AUTO: 10.5 FL (ref 6–12)
POTASSIUM SERPL-SCNC: 4.4 MMOL/L (ref 3.5–5.2)
QT INTERVAL: 404 MS
QTC INTERVAL: 409 MS
RBC # BLD AUTO: 4.61 10*6/MM3 (ref 4.14–5.8)
SODIUM SERPL-SCNC: 134 MMOL/L (ref 136–145)
WBC NRBC COR # BLD AUTO: 12.19 10*3/MM3 (ref 3.4–10.8)

## 2024-04-29 PROCEDURE — 74176 CT ABD & PELVIS W/O CONTRAST: CPT

## 2024-04-29 PROCEDURE — 82948 REAGENT STRIP/BLOOD GLUCOSE: CPT

## 2024-04-29 PROCEDURE — 85025 COMPLETE CBC W/AUTO DIFF WBC: CPT | Performed by: STUDENT IN AN ORGANIZED HEALTH CARE EDUCATION/TRAINING PROGRAM

## 2024-04-29 PROCEDURE — 25010000002 HYDROMORPHONE 1 MG/ML SOLUTION: Performed by: INTERNAL MEDICINE

## 2024-04-29 PROCEDURE — 82948 REAGENT STRIP/BLOOD GLUCOSE: CPT | Performed by: INTERNAL MEDICINE

## 2024-04-29 PROCEDURE — 97162 PT EVAL MOD COMPLEX 30 MIN: CPT

## 2024-04-29 PROCEDURE — 25010000002 MORPHINE PER 10 MG: Performed by: STUDENT IN AN ORGANIZED HEALTH CARE EDUCATION/TRAINING PROGRAM

## 2024-04-29 PROCEDURE — 97166 OT EVAL MOD COMPLEX 45 MIN: CPT

## 2024-04-29 RX ORDER — LISINOPRIL 5 MG/1
5 TABLET ORAL DAILY
Status: DISCONTINUED | OUTPATIENT
Start: 2024-04-30 | End: 2024-05-03 | Stop reason: HOSPADM

## 2024-04-29 RX ORDER — MORPHINE SULFATE 2 MG/ML
2 INJECTION, SOLUTION INTRAMUSCULAR; INTRAVENOUS EVERY 4 HOURS PRN
Status: DISCONTINUED | OUTPATIENT
Start: 2024-04-29 | End: 2024-04-30

## 2024-04-29 RX ADMIN — HYDROMORPHONE HYDROCHLORIDE 1 MG: 1 INJECTION, SOLUTION INTRAMUSCULAR; INTRAVENOUS; SUBCUTANEOUS at 11:55

## 2024-04-29 RX ADMIN — HYDROCODONE BITARTRATE AND ACETAMINOPHEN 2 TABLET: 5; 325 TABLET ORAL at 10:11

## 2024-04-29 RX ADMIN — TRAZODONE HYDROCHLORIDE 100 MG: 100 TABLET ORAL at 20:30

## 2024-04-29 RX ADMIN — PANTOPRAZOLE SODIUM 40 MG: 40 TABLET, DELAYED RELEASE ORAL at 06:05

## 2024-04-29 RX ADMIN — SERTRALINE 100 MG: 100 TABLET, FILM COATED ORAL at 06:05

## 2024-04-29 RX ADMIN — MORPHINE SULFATE 2 MG: 2 INJECTION, SOLUTION INTRAMUSCULAR; INTRAVENOUS at 18:14

## 2024-04-29 RX ADMIN — ZOLPIDEM TARTRATE 10 MG: 5 TABLET ORAL at 22:06

## 2024-04-29 RX ADMIN — BUPROPION HYDROCHLORIDE 300 MG: 150 TABLET, EXTENDED RELEASE ORAL at 06:05

## 2024-04-29 RX ADMIN — HYDROMORPHONE HYDROCHLORIDE 1 MG: 1 INJECTION, SOLUTION INTRAMUSCULAR; INTRAVENOUS; SUBCUTANEOUS at 06:05

## 2024-04-29 RX ADMIN — HYDROMORPHONE HYDROCHLORIDE 1 MG: 1 INJECTION, SOLUTION INTRAMUSCULAR; INTRAVENOUS; SUBCUTANEOUS at 14:56

## 2024-04-29 RX ADMIN — Medication 10 ML: at 20:31

## 2024-04-29 RX ADMIN — HYDROCODONE BITARTRATE AND ACETAMINOPHEN 2 TABLET: 5; 325 TABLET ORAL at 20:30

## 2024-04-29 RX ADMIN — ATORVASTATIN CALCIUM 20 MG: 20 TABLET, FILM COATED ORAL at 08:52

## 2024-04-29 RX ADMIN — LISINOPRIL 40 MG: 20 TABLET ORAL at 08:53

## 2024-04-29 RX ADMIN — EMPAGLIFLOZIN 10 MG: 10 TABLET, FILM COATED ORAL at 08:53

## 2024-04-29 NOTE — PLAN OF CARE
Goal Outcome Evaluation:  Patients pain controlled with prn IV and po pain meds, awaiting eval by therapy, labs obtained and on chart

## 2024-04-29 NOTE — PAYOR COMM NOTE
"Ion Barron (68 y.o. Male)  1955  Policy no. [id number]   Requesting IP Auth for ER Medical Admission    AUTHORIZATION PENDING  PLEASE FORWARD DETERMINATION TO FOLLOWING CONTACT:    JUANCHO BOYD LPN UR  Utilization Review Nurse  [facility]  Direct & confidential phone # [phone]  Fax # [phone]        Date of Birth   1955    Social Security Number       Address   [address] [place] [address] [place] [address]    Home Phone   [phone]    MRN   [id number]       [place]   None    Marital Status   Single                            Admission Date   24    Admission Type   Emergency    Admitting Provider   Gillian Leal MD    Attending Provider   Blanca Mcclendon MD    Department, Room/Bed   [facility] SURGICAL INPATIENT,        Discharge Date       Discharge Disposition       Discharge Destination                                 Attending Provider: Blanca Mcclendon MD    Allergies: No Known Allergies    Isolation: Contact   Infection: Candida Auris (rule out) (24)   Code Status: CPR    Ht: 190.5 cm (75\")   Wt: 125 kg (275 lb 9.2 oz)    Admission Cmt: None   Principal Problem: Fracture [T14.8XXA]                   Active Insurance as of 2024       Primary Coverage       Payor Plan Insurance Group Employer/Plan Group    [employer] [place] [id number]       Payor Plan Address Payor Plan Phone Number Payor Plan Fax Number Effective Dates    MAIL STOP:   2017 - None Entered    [address] [place], [place] [id number]         Subscriber Name Subscriber Birth Date Member ID       ION BARRON 1955 [id number]                     Emergency Contacts        (Rel.) Home Phone Work Phone Mobile Phone    Kinjal Varela (Sister) [phone] -- [phone]                 History & Physical        Gillian Leal MD at 24 1359            History and Physical   Ion " Giovanny Barron : 1955 MRN:[id number] LOS:0     Reason for admission: Fracture     Assessment / Plan     #Mechanical fall complicated with right acetabular nondisplaced fracture  -Patient presented with mechanical fall and after that he heard popping sound and started having right hip pain.  -X-ray showing nondisplaced fracture of the medial right acetabulum.  -Orthopedic surgeon consulted.  -Pain management.  -Fall precaution.  -PT OT .  -To follow-up for lab test.  Pending for now.    #COPD, not in exacerbation  -Respiratory treatment oxygen supplement as needed    # Diabetes mellitus  -Accu-Chek SSI    Code Status (Patient has no pulse and is not breathing): CPR (Attempt to Resuscitate)  Medical Interventions (Patient has pulse or is breathing): Full Support       Nutrition: NPO Diet NPO Type: Strict NPO     DVT Prophylaxis:   Mechanical Order History:        Ordered        24 1334  Place Sequential Compression Device  Once            24 1334  Maintain Sequential Compression Device  Continuous                          Pharmalogical Order History:       None             History of Present illness     A 68 y.o. old male patient with PMH of COPD diabetes mellitus dyskinesia presents to the hospital with complaints of mechanical fall.  Does not have any headache dizziness blurred vision significant weakness prior to the event.  Patient does not hit the head.  Patient has a skin abrasion on the left forearm.  Other mechanical fall he heard a popping sound and started having right hip pain.  In the ED patient hemodynamically stable in the history showing there is nondisplaced fracture of the medial right acetabulum.  Patient has right hip arthroplasty many years ago.  Orthopedic surgeon consulted.     Patient will be admitted for right acetabulum fracture management.    Subjective / Review of systems     Review of Systems   And in the right hip.  No chest pain shortness of breath  abdominal pain nausea vomiting reported.      Past Medical/Surgical/Social/Family History & Allergies     Past Medical History:   Diagnosis Date    COPD (chronic obstructive pulmonary disease)     Diabetes mellitus     Tardive dyskinesia       Past Surgical History:   Procedure Laterality Date    CHOLECYSTECTOMY N/A 2/15/2023    Procedure: CHOLECYSTECTOMY LAPAROSCOPIC WITH DAVINCI ROBOT;  Surgeon: Tha Franklin MD;  Location: [facility] MAIN OR;  Service: Robotics - DaVinci;  Laterality: N/A;      Social History     Socioeconomic History    Marital status: Single   Tobacco Use    Smoking status: Every Day     Current packs/day: 1.00     Average packs/day: 1 pack/day for 40.0 years (40.0 ttl pk-yrs)     Types: Cigarettes    Smokeless tobacco: Never   Vaping Use    Vaping status: Never Used    Passive vaping exposure: Yes   Substance and Sexual Activity    Alcohol use: Never    Drug use: Never    Sexual activity: Not Currently      No family history on file.   No Known Allergies     Home Medications     Prior to Admission medications    Medication Sig Start Date End Date Taking? Authorizing Provider   Aspirin Low Dose 81 MG EC tablet Take 1 tablet by mouth Daily. 4/1/24  Yes Teodoro Brooks MD   atorvastatin (LIPITOR) 20 MG tablet Take 1 tablet by mouth Every Night. 1/20/23  Yes Teodoro Brooks MD   buPROPion XL (WELLBUTRIN XL) 300 MG 24 hr tablet Take 1 tablet by mouth Every Morning. 1/20/23  Yes Teodoro Brooks MD   Farxiga 10 MG tablet Take 10 mg by mouth Daily. 4/1/24  Yes Teodoro Brooks MD   insulin glargine (LANTUS, SEMGLEE) 100 UNIT/ML injection Inject 10 Units under the skin into the appropriate area as directed Daily.   Yes Teodoro Brooks MD   lisinopril (PRINIVIL,ZESTRIL) 40 MG tablet Take 1 tablet by mouth Daily. 4/1/24  Yes Teodoro Brooks MD   meloxicam (MOBIC) 15 MG tablet Take 1 tablet by mouth Daily. 3/8/24  Yes Teodoro Brooks MD   metFORMIN  (GLUCOPHAGE) 1000 MG tablet Take 1 tablet by mouth 2 (Two) Times a Day. 1/20/23  Yes Teodoro Brooks MD   omeprazole (priLOSEC) 40 MG capsule Take 1 capsule by mouth Daily.   Yes Teodoro Brooks MD   predniSONE (DELTASONE) 20 MG tablet Take 1 tablet by mouth Daily.   Yes Teodoro Brooks MD   pyridostigmine (MESTINON) 180 MG CR tablet Take 1 tablet by mouth 2 (Two) Times a Day.   Yes Teodoro Brooks MD   sertraline (ZOLOFT) 100 MG tablet Take 1 tablet by mouth Every Morning. 1/20/23  Yes Teodoro Brooks MD   traZODone (DESYREL) 100 MG tablet Take 2 tablets by mouth Every Night. 1/20/23  Yes Teodoro Brooks MD   Valbenazine Tosylate 80 MG capsule Take 80 mg by mouth Every Morning. 6/29/17  Yes Teodoro Brooks MD   zolpidem (AMBIEN) 10 MG tablet Take 1 tablet by mouth Every Night. 1/26/23  Yes Provider, Historical, MD   GNP Omeprazole 20 MG tablet delayed-release Take 40 mg by mouth 1 (One) Time. 1/5/23 4/27/24 Yes Teodoro Brooks MD   albuterol sulfate  (90 Base) MCG/ACT inhaler Inhale 2 puffs 4 (Four) Times a Day As Needed.  4/27/24  Teodoro Brooks MD   amoxicillin-clavulanate (Augmentin) 875-125 MG per tablet Take 1 tablet by mouth 2 (Two) Times a Day. 2/16/23 4/27/24  Emanuel Mckinney MD   Mestinon 180 MG CR tablet Take 1 tablet by mouth 2 (Two) Times a Day.  4/27/24  Teodoro Brooks MD   zolpidem (AMBIEN) 10 MG tablet Take 1 tablet by mouth At Night As Needed for Sleep.  4/27/24  Teodoro Brooks MD      Objective / Physical Exam   Vital signs:  Temp: 98.5 °F (36.9 °C)  BP: 119/65  Heart Rate: 81  Resp: 18  SpO2: 92 %  Weight: 125 kg (275 lb 9.2 oz)    Admission Weight: Weight: 125 kg (275 lb 9.2 oz)    Physical Exam   Physical Exam  HENT:      Head: Normocephalic and atraumatic.      Nose: Nose normal.   Eyes:      Extraocular Movements: Extraocular movements intact.      Conjunctivae/sclera: Conjunctivae normal.      Pupils: Pupils are  "equal, round, and reactive to light.   Cardiovascular:      Rate and Rhythm: normal       Pulses: Normal pulses.      Heart sounds: Normal heart sounds.   Pulmonary:      Effort: normal      Breath sounds: normal   Abdominal:      General: Abdomen is flat. Bowel sounds are normal.      Palpations: Abdomen is soft.   Musculoskeletal:         Limited movement on the RLE   Tenderness in right hip   Skin:     General: Skin is dry.   Neurological:      General: No focal deficit present.      Mental Status: alert.   Psychiatric:         Mood and Affect: Mood normal.        Labs         Invalid input(s): \"HBG\"          Current Medications   Scheduled Meds:atorvastatin, 20 mg, Oral, Daily  [START ON 4/28/2024] buPROPion XL, 300 mg, Oral, QAM  insulin lispro, 2-7 Units, Subcutaneous, 4x Daily AC & at Bedtime  [START ON 4/28/2024] pantoprazole, 40 mg, Oral, Q AM  [START ON 4/28/2024] sertraline, 100 mg, Oral, QAM  sodium chloride, 10 mL, Intravenous, Q12H  traZODone, 100 mg, Oral, Nightly         Continuous Infusions:      Gillian Leal MD  [facility]   04/27/24   13:59 EDT         Electronically signed by Gillian Leal MD at 04/27/24 1405          Emergency Department Notes        Corona Hernandez MD at 04/27/24 1218          Subjective   History of Present Illness  68-year-old male presents after slipped states did the splits and fell.  He complains pain to right hip if he tries to raise it or bear weight.  He has history of previous replacement about 12 years ago.  He has no complaints of head neck chest abdomen back or other extremity pain other than minor abrasion to arm.  Review of Systems    Past Medical History:   Diagnosis Date    COPD (chronic obstructive pulmonary disease)     Diabetes mellitus     Tardive dyskinesia        No Known Allergies    Past Surgical History:   Procedure Laterality Date    CHOLECYSTECTOMY N/A 2/15/2023    Procedure: CHOLECYSTECTOMY LAPAROSCOPIC WITH DAVINCI ROBOT;  Surgeon: Noemi" Tha Larsen MD;  Location: [facility] MAIN OR;  Service: Robotics - DaVinci;  Laterality: N/A;       No family history on file.    Social History     Socioeconomic History    Marital status: Single   Tobacco Use    Smoking status: Every Day     Current packs/day: 1.00     Average packs/day: 1 pack/day for 40.0 years (40.0 ttl pk-yrs)     Types: Cigarettes    Smokeless tobacco: Never   Vaping Use    Vaping status: Never Used    Passive vaping exposure: Yes   Substance and Sexual Activity    Alcohol use: Never    Drug use: Never    Sexual activity: Not Currently     Prior to Admission medications    Medication Sig Start Date End Date Taking? Authorizing Provider   albuterol sulfate  (90 Base) MCG/ACT inhaler Inhale 2 puffs 4 (Four) Times a Day As Needed.    Teodoro Brooks MD   amoxicillin-clavulanate (Augmentin) 875-125 MG per tablet Take 1 tablet by mouth 2 (Two) Times a Day. 2/16/23   Emanuel Mckinney MD   atorvastatin (LIPITOR) 10 MG tablet Take 10 mg by mouth 1 (One) Time. 1/20/23   Teodoro Brooks MD   buPROPion XL (WELLBUTRIN XL) 300 MG 24 hr tablet Take 300 mg by mouth Every Morning. 1/20/23   Provider, Historical, MD   GNP Omeprazole 20 MG tablet delayed-release Take 20 mg by mouth 1 (One) Time. 1/5/23   Teodoro Brooks MD   metFORMIN (GLUCOPHAGE) 500 MG tablet Take 500 mg by mouth 2 (Two) Times a Day. 1/20/23   Teodoro Brooks MD   sertraline (ZOLOFT) 100 MG tablet Take 100 mg by mouth Every Morning. 1/20/23   Teodoro Brooks MD   traZODone (DESYREL) 100 MG tablet Take 100 mg by mouth Every Night. 1/20/23   Teodoro Brooks MD   Valbenazine Tosylate (Ingrezza) 40 MG capsule Take 40 mg by mouth Every Morning. 6/29/17   Teodoro Brooks MD   zolpidem (AMBIEN) 10 MG tablet Take 10 mg by mouth Every Night. 1/26/23   Teodoro Brooks MD           Objective   Physical Exam  68-year-old male awake alert.  Generally overweight.  No complaints of head neck chest  abdomen or back pain.  Examination of arm does minor abrasion over dorsal forearm.  He has full range of motion no bony tenderness.  Examination of legs he has no complaints of pain with palpation or movement of left hip.  He does complain of pain with trying to raise her right hip.  He complains of mild pain with internal/external rotation knee is nontender and he is neuro vasc intact distally  Procedures          ED Course                                             Medical Decision Making  Amount and/or Complexity of Data Reviewed  Radiology: ordered.    Chart review: Patient had cholecystectomy February of last year with Dr. Franklin  Comorbidity: As per past history   Differential: Fracture, contusion, strain, clinically dislocation not present  My EKG interpretation: Not indicated  Lab: Not indicated  My Radiology review and interpretation: X-ray of right hip and pelvis reveals a nondisplaced fracture medial right acetabulum.  There is right hip arthroplasty that appears appropriately positioned without hardware complication.  Discussion/treatment: Patient's findings were discussed with him.  He was discussed with Dr. Parker orthopedics.  Patient will require admission for rehab.  He is currently in assisted living has stairs and is not able to be nonweightbearing in his current living situation.  Patient was discussed with the Doctor Elvis. He Will be admitted to hospitalist service for continued care.  Patient was evaluated using appropriate PPE      Final diagnoses:   Closed nondisplaced fracture of medial wall of right acetabulum, initial encounter       ED Disposition  ED Disposition       ED Disposition   Decision to Admit    Condition   --    Comment   Level of Care: Med/Surg [1]   Admitting Physician: ANA M MATTHEWS [[id number]]                 No follow-up provider specified.       Medication List      No changes were made to your prescriptions during this visit.            Corona Hernandez MD  04/27/24  1334      Electronically signed by Corona Hernandez MD at 24 1334          Physician Progress Notes (last 48 hours)        Blanca Mcclendon MD at 24 1006           [facility]     Progress Note    Patient Name: Tha Barron  : 1955  MRN: [id number]  Primary Care Physician:  Provider, No Known  Date of admission: 2024  Service date and time: 24 10:06 EDT  Subjective   Subjective     Chief Complaint: Fracture    HPI:  Patient seen and examined this morning, patient laying on the bed comfortably.  Patient has no complaint today.    Denies chest pain, shortness of breath, nausea and vomiting    Disposition: Awaiting placement      Objective   Objective     Vitals:   Temp:  [97.6 °F (36.4 °C)-98.8 °F (37.1 °C)] 97.6 °F (36.4 °C)  Resp:  [16-18] 16  BP: (110-128)/(59-79) 128/79  Physical Exam    Constitutional: Awake, alert    Respiratory: Clear to auscultation bilaterally, nonlabored respirations    Cardiovascular: RRR, no murmurs, rubs, or gallops, palpable pedal pulses bilaterally   Gastrointestinal: Positive bowel sounds, soft, nontender, nondistended   Musculoskeletal: No bilateral ankle edema, no clubbing or cyanosis to extremities   Psychiatric: Appropriate affect, cooperative   Neurologic: Oriented x 3, speech clear   Skin: No rashes     Result Review    Result Review:  I have personally reviewed the results from the time of this admission to 2024 10:06 EDT and agree with these findings:  [x]  Laboratory list / accordion  []  Microbiology  [x]  Radiology  []  EKG/Telemetry   []  Cardiology/Vascular   []  Pathology  []  Old records  []  Other:        Assessment & Plan   Assessment / Plan       Active Hospital Problems:  Active Hospital Problems    Diagnosis     **Fracture      Plan:      #Mechanical fall complicated with right acetabular nondisplaced fracture  -Patient presented with mechanical fall and after that he heard popping sound and started having right hip  pain.  -X-ray showing nondisplaced fracture of the medial right acetabulum.  -Orthopedic surgeon consulted and recommended nonsurgical intervention nonbearing weight for 6 weeks  -Pain management.  -Fall precaution.  -PT OT .         #COPD, not in exacerbation  -Respiratory treatment oxygen supplement as needed     # Diabetes mellitus  -Accu-Chek SSI    DVT prophylaxis:  Mechanical DVT prophylaxis orders are present.        CODE STATUS:   Code Status (Patient has no pulse and is not breathing): CPR (Attempt to Resuscitate)  Medical Interventions (Patient has pulse or is breathing): Full Support    Disposition:  I expect patient to be discharged 24 to 40 hours.    Blanca Mcclendon MD     Electronically signed by Blanca Mcclendon MD at 24 1008       Brammell, Timothy Duane, MD at 24 [facility]8              [facility] MEDICINE SERVICE  DAILY PROGRESS NOTE    NAME: Tha Barron  : 1955  MRN: [id number]      LOS: 1 day     PROVIDER OF SERVICE: Timothy Duane Brammell, MD    Chief Complaint: Fracture    Subjective:     Interval History:  History taken from: patient  Patient Complaints: Patient with some pain with attempts of movement.  He denies any other issues other than his pain.  He denies any shortness of breath or gastric bowel or urinary issues.      Review of Systems:   Review of Systems   All other systems reviewed and are negative.      Objective:     Vital Signs  Temp:  [97.7 °F (36.5 °C)-98.5 °F (36.9 °C)] 98.1 °F (36.7 °C)  Heart Rate:  [55-81] 55  Resp:  [16-20] 18  BP: (100-119)/(59-78) 110/72   Body mass index is 34.44 kg/m².    Physical Exam  Physical Exam  Constitutional:       Appearance: Normal appearance.   HENT:      Head: Normocephalic and atraumatic.   Cardiovascular:      Rate and Rhythm: Normal rate and regular rhythm.      Comments: Dorsalis pedis pulse worse on the right and left.  Pulmonary:      Effort: Pulmonary effort is normal.      Breath sounds:  Normal breath sounds.   Skin:     Comments: Cyanosis of the digits of the feet bilaterally.   Neurological:      Mental Status: He is alert.         Scheduled Meds   atorvastatin, 20 mg, Oral, Daily  buPROPion XL, 300 mg, Oral, QAM  empagliflozin, 10 mg, Oral, Daily  insulin lispro, 2-7 Units, Subcutaneous, 4x Daily AC & at Bedtime  lisinopril, 40 mg, Oral, Daily  pantoprazole, 40 mg, Oral, Q AM  sertraline, 100 mg, Oral, QAM  sodium chloride, 10 mL, Intravenous, Q12H  traZODone, 100 mg, Oral, Nightly       PRN Meds     acetaminophen    aluminum-magnesium hydroxide-simethicone    senna-docusate sodium **AND** polyethylene glycol **AND** bisacodyl **AND** bisacodyl    Calcium Replacement - Follow Nurse / BPA Driven Protocol    dextrose    dextrose    dextrose    dextrose    glucagon (human recombinant)    HYDROcodone-acetaminophen    HYDROmorphone    ipratropium-albuterol    Magnesium Standard Dose Replacement - Follow Nurse / BPA Driven Protocol    melatonin    nitroglycerin    ondansetron ODT **OR** ondansetron    Phosphorus Replacement - Follow Nurse / BPA Driven Protocol    Potassium Replacement - Follow Nurse / BPA Driven Protocol    sodium chloride    sodium chloride    zolpidem   Infusions         Diagnostic Data    Results from last 7 days   Lab Units 04/27/24  2358   WBC 10*3/mm3 10.87*   HEMOGLOBIN g/dL 13.0   HEMATOCRIT % 40.8   PLATELETS 10*3/mm3 259   GLUCOSE mg/dL 123*   CREATININE mg/dL 0.82   BUN mg/dL 19   SODIUM mmol/L 138   POTASSIUM mmol/L 4.3   ANION GAP mmol/L 7.0       XR Chest 1 View    Result Date: 4/28/2024  Impression: 1. No acute cardiopulmonary abnormality. No acute change from prior exam. Electronically Signed: Giuseppe Young  4/28/2024 10:41 AM EDT  Workstation ID: [id number]    XR Hip With or Without Pelvis 2 - 3 View Right    Result Date: 4/27/2024  Impression: 1.Nondisplaced fracture of the medial right acetabulum. 2.Status post right hip arthroplasty. No definite acute hardware  complication. Electronically Signed: Asim Yin  4/27/2024 12:53 PM EDT  Workstation ID: [id number]       None    Assessmen:    1.  Nondisplaced right hip fracture  2.  History of COPD  3.  Type 2 diabetes   4.  Hypertension    Plan.  Patient will be seen in evaluation by physical therapy.  He will be assessed as to his rehab needs.  Symptomatic pain control    Active and Resolved Problems  Active Hospital Problems    Diagnosis  POA    **Fracture [T14.8XXA]  Yes      Resolved Hospital Problems   No resolved problems to display.           DVT prophylaxis:  Mechanical DVT prophylaxis orders are present.         Code status is   Code Status and Medical Interventions:   Ordered at: 04/27/24 1335     Code Status (Patient has no pulse and is not breathing):    CPR (Attempt to Resuscitate)     Medical Interventions (Patient has pulse or is breathing):    Full Support       Plan for disposition:Rehab in 1 days    Time: 30 minutes    Signature: Electronically signed by Timothy Duane Brammell, MD, 04/28/24, 12:38 EDT.  [facility] Hospitalist Team     Electronically signed by Brammell, Timothy Duane, MD at 04/28/24 1246          Consult Notes (last 48 hours)        Justino Parker MD at 04/28/24 1022        Consult Orders    1. Ortho (on-call MD unless specified) [[id number]] ordered by Gillian Leal MD at 04/27/24 1259                      Orthopaedic Consultation      Patient: Tha Barron    Date of Admission: 4/27/2024 12:12 PM    YOB: 1955    Medical Record Number: [id number]    Attending Physician:  Brammell, Timothy Duane,*    Consulting Physician:  Justino Parker MD        Chief Complaints: Right hip acetabular fracture    History of Present Illness: 68 y.o. male admitted to [facility] with right hip acetabular fracture. I was consulted for further evaluation and treatment. Onset of symptoms was abrupt starting 1 day ago.  Symptoms are associated with right hip pain.  Symptoms are  aggravated by motion.   Symptoms improve with rest.  The patient reports that he fell onto the right hip yesterday.  He had difficulty bearing weight.  He lives in a group home with a lot of steps.  He had a right hip replacement performed about 12 years ago.  He denies any problems with the hip before the fall.    Allergies: No Known Allergies    Medications:   Home Medications:  Medications Prior to Admission   Medication Sig Dispense Refill Last Dose    Aspirin Low Dose 81 MG EC tablet Take 1 tablet by mouth Daily.       atorvastatin (LIPITOR) 20 MG tablet Take 1 tablet by mouth Every Night.   4/27/2024    buPROPion XL (WELLBUTRIN XL) 300 MG 24 hr tablet Take 1 tablet by mouth Every Morning.   4/27/2024    Farxiga 10 MG tablet Take 10 mg by mouth Daily.       insulin glargine (LANTUS, SEMGLEE) 100 UNIT/ML injection Inject 10 Units under the skin into the appropriate area as directed Daily.       lisinopril (PRINIVIL,ZESTRIL) 40 MG tablet Take 1 tablet by mouth Daily.       meloxicam (MOBIC) 15 MG tablet Take 1 tablet by mouth Daily.       metFORMIN (GLUCOPHAGE) 1000 MG tablet Take 1 tablet by mouth 2 (Two) Times a Day.   4/27/2024    omeprazole (priLOSEC) 40 MG capsule Take 1 capsule by mouth Daily.       predniSONE (DELTASONE) 20 MG tablet Take 1 tablet by mouth Daily.       pyridostigmine (MESTINON) 180 MG CR tablet Take 1 tablet by mouth 2 (Two) Times a Day.       sertraline (ZOLOFT) 100 MG tablet Take 1 tablet by mouth Every Morning.   4/27/2024    traZODone (DESYREL) 100 MG tablet Take 2 tablets by mouth Every Night.   4/26/2024    Valbenazine Tosylate 80 MG capsule Take 80 mg by mouth Every Morning.   4/27/2024    zolpidem (AMBIEN) 10 MG tablet Take 1 tablet by mouth Every Night.   4/26/2024       Current Medications:  Scheduled Meds:atorvastatin, 20 mg, Oral, Daily  buPROPion XL, 300 mg, Oral, QAM  empagliflozin, 10 mg, Oral, Daily  insulin lispro, 2-7 Units, Subcutaneous, 4x Daily AC & at  Bedtime  lisinopril, 40 mg, Oral, Daily  pantoprazole, 40 mg, Oral, Q AM  sertraline, 100 mg, Oral, QAM  sodium chloride, 10 mL, Intravenous, Q12H  traZODone, 100 mg, Oral, Nightly      Continuous Infusions:   PRN Meds:.  acetaminophen    aluminum-magnesium hydroxide-simethicone    senna-docusate sodium **AND** polyethylene glycol **AND** bisacodyl **AND** bisacodyl    Calcium Replacement - Follow Nurse / BPA Driven Protocol    dextrose    dextrose    dextrose    dextrose    glucagon (human recombinant)    HYDROcodone-acetaminophen    HYDROmorphone    ipratropium-albuterol    Magnesium Standard Dose Replacement - Follow Nurse / BPA Driven Protocol    melatonin    nitroglycerin    ondansetron ODT **OR** ondansetron    Phosphorus Replacement - Follow Nurse / BPA Driven Protocol    Potassium Replacement - Follow Nurse / BPA Driven Protocol    sodium chloride    sodium chloride    zolpidem    Past Medical History:   Diagnosis Date    COPD (chronic obstructive pulmonary disease)     Diabetes mellitus     Myasthenia gravis     Tardive dyskinesia      Past Surgical History:   Procedure Laterality Date    CHOLECYSTECTOMY N/A 2/15/2023    Procedure: CHOLECYSTECTOMY LAPAROSCOPIC WITH DAVINCI ROBOT;  Surgeon: Tha Franklin MD;  Location: [facility] MAIN OR;  Service: Robotics - [employer];  Laterality: N/A;     Social History     Occupational History    Not on file   Tobacco Use    Smoking status: Every Day     Current packs/day: 1.00     Average packs/day: 1 pack/day for 40.0 years (40.0 ttl pk-yrs)     Types: Cigarettes    Smokeless tobacco: Never   Vaping Use    Vaping status: Never Used    Passive vaping exposure: Yes   Substance and Sexual Activity    Alcohol use: Never    Drug use: Never    Sexual activity: Not Currently      Social History     Social History Narrative    Not on file     History reviewed. No pertinent family history.      Review of Systems:   No other pertinent positives or negatives other than what  is mentioned in the HPI and below.  Constitutional: Negative for fatigue, fever, or weight loss  HEENT: No active headache.  Pulmonary: Patient denies SOA.  Cardiovascular: Patient denies any chest pain.  Gastrointestinal:  Patient denies active vomiting or diarrhea.  Musculoskeletal: Positive for right hip pain.  Neurological: Patient denies active dizziness or loss of consciousness.  Skin: Patient denies any active bleeding.    Vital signs in last 24 hours:  Temp:  [97.7 °F (36.5 °C)-98.5 °F (36.9 °C)] 98.3 °F (36.8 °C)  Heart Rate:  [55-81] 55  Resp:  [16-20] 18  BP: (100-129)/(59-78) 113/77  Vitals:    04/27/24 2006 04/28/24 0023 04/28/24 0500 04/28/24 0833   BP: 111/65 107/67 116/66 113/77   BP Location: Right arm Right arm Right arm Right arm   Patient Position: Sitting Sitting Lying Lying   Pulse: 62 58 55    Resp: 16 17 16 18   Temp: 98.3 °F (36.8 °C) 97.8 °F (36.6 °C) 97.7 °F (36.5 °C) 98.3 °F (36.8 °C)   TempSrc: Oral Oral Oral Oral   SpO2: 91% 93% 91% 92%   Weight:       Height:              Physical Exam: 68 y.o. male         General Appearance:  Alert, cooperative, in no acute distress    HEENT:    Atraumatic, Pupils are equal   Neck:   Cervical spine midline, no appreciable JVD   Lungs:     Breathing non-labored and chest rise symmetric    Heart:   Abdomen:     Rectal:       Extremities:   Pulses  Neurovascular:   Skin:   Musculoskeletal:      Pulse regular    Soft, Non-tender or distended    Deferred        No clubbing, cyanosis, or edema    Intact    Cranial nerves 2 - 12 grossly intact, sensation intact    No skin lesions  Examination of right hip reveals tenderness to palpation.  Very slight pain with logroll.  Tolerates gentle motion of the knee and ankle.  Foot warm and well-perfused.  Normal motor and sensory exam.  Compartments are soft.  No appreciable skin lesions.     Diagnostic Tests:    Results from last 7 days   Lab Units 04/27/24  2358   WBC 10*3/mm3 10.87*   HEMOGLOBIN g/dL 13.0    HEMATOCRIT % 40.8   PLATELETS 10*3/mm3 259     Results from last 7 days   Lab Units 04/27/24  2358   SODIUM mmol/L 138   POTASSIUM mmol/L 4.3   CHLORIDE mmol/L 103   CO2 mmol/L 28.0   BUN mg/dL 19   CREATININE mg/dL 0.82   GLUCOSE mg/dL 123*   CALCIUM mg/dL 9.1         XR Hip With or Without Pelvis 2 - 3 View Right    Result Date: 4/27/2024  Impression: 1.Nondisplaced fracture of the medial right acetabulum. 2.Status post right hip arthroplasty. No definite acute hardware complication. Electronically Signed: Asim Yin  4/27/2024 12:53 PM EDT  Workstation ID: [id number]       Assessment:    Fracture  Right hip nondisplaced acetabular fracture    Plan: I discussed the imaging findings with the patient.  I recommend conservative nonoperative management.  He will be nonweightbearing on the right lower extremity for 4 to 6 weeks pending recovery.  He voiced understanding.  I would like to see him back in the office in 3 to 4 weeks.  He agreed.  All of his questions answered.  He will likely need rehab.  Please call with any questions.    Date: 4/28/2024  Justino Parker MD        Electronically signed by Justino Parker MD at 04/28/24 1024

## 2024-04-29 NOTE — THERAPY EVALUATION
Patient Name: Tha Barron  : 1955    MRN: [id number]                              Today's Date: 2024       Admit Date: 2024    Visit Dx:     ICD-10-CM ICD-9-CM   1. Closed nondisplaced fracture of medial wall of right acetabulum, initial encounter  S32.474A 808.0     Patient Active Problem List   Diagnosis    Abdominal pain, acute, right upper quadrant    Acute cholecystitis    Fracture     Past Medical History:   Diagnosis Date    COPD (chronic obstructive pulmonary disease)     Diabetes mellitus     Myasthenia gravis     Tardive dyskinesia      Past Surgical History:   Procedure Laterality Date    CHOLECYSTECTOMY N/A 2/15/2023    Procedure: CHOLECYSTECTOMY LAPAROSCOPIC WITH DAVINCI ROBOT;  Surgeon: Tha Franklin MD;  Location: [facility] MAIN OR;  Service: Robotics - [employer];  Laterality: N/A;      General Information       Row Name 24 1511          Physical Therapy Time and Intention    Document Type evaluation  -OD     Mode of Treatment physical therapy  -OD       Row Name 24 1511          General Information    Patient Profile Reviewed yes  -OD     Prior Level of Function independent:;ADL's;all household mobility;dependent:;cleaning;home management;cooking  -OD     Existing Precautions/Restrictions right;non-weight bearing;other (see comments)  RLE NWB for 4-6 weeks  -OD       Row Name 24 1511          Living Environment    People in Home other (see comments)  Group home  -OD       Row Name 24 1511          Home Main Entrance    Number of Stairs, Main Entrance one  -OD       Row Name 24 1511          Stairs Within Home, Primary    Number of Stairs, Within Home, Primary twelve;other (see comments)  Pt's bedroom is in the basement, and the living room/kitchen is on the main floor.  -OD       Row Name 24 1511          Cognition    Orientation Status (Cognition) oriented x 4  -OD       Row Name 24 1511          Safety Issues, Functional  Mobility    Impairments Affecting Function (Mobility) pain;strength;endurance/activity tolerance  -OD               User Key  (r) = Recorded By, (t) = Taken By, (c) = Cosigned By      Initials Name Provider Type    Aracelis Mendes PT Physical Therapist                   Mobility       Row Name 04/29/24 1057          Bed Mobility    Bed Mobility bed mobility (all) activities  -OD     All Activities, [place] (Bed Mobility) minimum assist (75% patient effort);2 person assist  -OD     Comment, (Bed Mobility) Assist with RLE  -OD       Row Name 04/29/24 1057          Bed-Chair Transfer    Bed-Chair [place] (Transfers) minimum assist (75% patient effort);2 person assist  -OD     Assistive Device (Bed-Chair Transfers) walker, front-wheeled  -OD       Row Name 04/29/24 1057          Sit-Stand Transfer    Sit-Stand [place] (Transfers) minimum assist (75% patient effort);2 person assist  -OD     Assistive Device (Sit-Stand Transfers) walker, front-wheeled  -OD       Row Name 04/29/24 1057          Gait/Stairs (Locomotion)    [place] Level (Gait) minimum assist (75% patient effort);2 person assist  -OD     Assistive Device (Gait) walker, front-wheeled  -OD     Patient was able to Ambulate yes  -OD     Distance in Feet (Gait) 2  -OD     Deviations/Abnormal Patterns (Gait) festinating/shuffling;other (see comments)  hop-to  -OD       Row Name 04/29/24 1057          Mobility    Extremity Weight-bearing Status right lower extremity  -OD     Right Lower Extremity (Weight-bearing Status) non weight-bearing (NWB)  -OD               User Key  (r) = Recorded By, (t) = Taken By, (c) = Cosigned By      Initials Name Provider Type    Aracelis Mendes PT Physical Therapist                   Obj/Interventions       Row Name 04/29/24 1528          Range of Motion Comprehensive    General Range of Motion lower extremity range of motion deficits identified  -OD     Comment, General Range of Motion Limited AROM 2/2 pain   -OD       Row Name 04/29/24 1528          Strength Comprehensive (MMT)    General Manual Muscle Testing (MMT) Assessment lower extremity strength deficits identified  -OD     Comment, General Manual Muscle Testing (MMT) Assessment Pt demo weakness r/t pain  -OD       Row Name 04/29/24 1528          Balance    Balance Interventions sitting;minimal challenge;standing;supported;moderate challenge  -OD       Row Name 04/29/24 1528          Sensory Assessment (Somatosensory)    Sensory Assessment (Somatosensory) sensation intact  -OD               User Key  (r) = Recorded By, (t) = Taken By, (c) = Cosigned By      Initials Name Provider Type    OD Aracelis Ross, PT Physical Therapist                   Goals/Plan       Row Name 04/29/24 1548          Bed Mobility Goal 1 (PT)    Activity/Assistive Device (Bed Mobility Goal 1, PT) bed mobility activities, all  -OD     [place] Level/Cues Needed (Bed Mobility Goal 1, PT) independent  -OD     Time Frame (Bed Mobility Goal 1, PT) long term goal (LTG);2 weeks  -OD       Row Name 04/29/24 1548          Transfer Goal 1 (PT)    Activity/Assistive Device (Transfer Goal 1, PT) transfers, all  -OD     [place] Level/Cues Needed (Transfer Goal 1, PT) modified independence  -OD     Time Frame (Transfer Goal 1, PT) long term goal (LTG);2 weeks  -OD     Strategies/Barriers (Transfers Goal 1, PT) NWB RLE  -OD       Row Name 04/29/24 1548          Gait Training Goal 1 (PT)    Activity/Assistive Device (Gait Training Goal 1, PT) gait (walking locomotion);assistive device use;walker, rolling  -OD     [place] Level (Gait Training Goal 1, PT) modified independence  -OD     Distance (Gait Training Goal 1, PT) 20 feet  -OD     Time Frame (Gait Training Goal 1, PT) long term goal (LTG);2 weeks  -OD     Strategies/Barriers (Gait Training Goal 1, PT) NWB RLE  -OD       Row Name 04/29/24 1548          Therapy Assessment/Plan (PT)    Planned Therapy Interventions (PT) balance  training;bed mobility training;gait training;home exercise program;neuromuscular re-education;ROM (range of motion);strengthening;stretching;patient/family education;postural re-education;transfer training  -OD               User Key  (r) = Recorded By, (t) = Taken By, (c) = Cosigned By      Initials Name Provider Type    OD Aracelis Ross, PT Physical Therapist                   Clinical Impression       Row Name 04/29/24 1529          Pain    Additional Documentation Pain Scale: FACES Pre/Post-Treatment (Group)  -OD       Row Name 04/29/24 1529          Pain Scale: FACES Pre/Post-Treatment    Pain: FACES Scale, Pretreatment 4-->hurts little more  -OD     Posttreatment Pain Rating 6-->hurts even more  -OD     Pain Location - Side/Orientation Right  -OD     Pain Location generalized  -OD     Pain Location - hip  -OD       Row Name 04/29/24 1529          Plan of Care Review    Plan of Care Reviewed With patient  -OD     Progress no change  -OD     Outcome Evaluation Tha Barron (prefers Carlos) is a 67 y/o old M with PMH of COPD, diabetes mellitus, dyskinesia who presents to [facility] on 4/27 after a fall. Imaging shows nondisplaced fracture of the medial right acetabulum. Ortho consulted, recommending conservative management. Pt now NWB to RLE and will be for 6 weeks. At baseline, pt lives at a group home with 1STE and a flight of stairs down to his bedroom/bathroom. The living room and kitchen are on main floor. Pt currently is AAOx4 and demo understanding of weight-bearing precautions. Pt limited mostly to pain, but only requires minAx2 for bed mobility, STS, and transfer to bedside recliner. Pt likely to improve well with continued education for NWB status and hop-to pattern for ambulation. Pt would benefit from SNF upon d/c for safe return to PLOF. PT will follow while admitted 5x/wk.  -OD       Row Name 04/29/24 1524          Therapy Assessment/Plan (PT)    Rehab Potential (PT) good, to achieve stated therapy  goals  -OD     Criteria for Skilled Interventions Met (PT) yes;meets criteria  -OD     Therapy Frequency (PT) 5 times/wk  -OD     Predicted Duration of Therapy Intervention (PT) until d/c  -OD       Row Name 04/29/24 1529          Vital Signs    O2 Delivery Pre Treatment room air  -OD     O2 Delivery Intra Treatment room air  -OD     O2 Delivery Post Treatment room air  -OD     Pre Patient Position Supine  -OD     Intra Patient Position Standing  -OD     Post Patient Position Sitting  -OD       Row Name 04/29/24 1529          Positioning and Restraints    Pre-Treatment Position in bed  -OD     Post Treatment Position chair  -OD     In Chair notified nsg;reclined;call light within reach;encouraged to call for assist;exit alarm on  -OD               User Key  (r) = Recorded By, (t) = Taken By, (c) = Cosigned By      Initials Name Provider Type    Aracelis Mendes, PT Physical Therapist                   Outcome Measures       Row Name 04/29/24 1551 04/29/24 0855       How much help from another person do you currently need...    Turning from your back to your side while in flat bed without using bedrails? 3  -OD 2  -EY    Moving from lying on back to sitting on the side of a flat bed without bedrails? 3  -OD 2  -EY    Moving to and from a bed to a chair (including a wheelchair)? 3  -OD 1  -EY    Standing up from a chair using your arms (e.g., wheelchair, bedside chair)? 2  -OD 1  -EY    Climbing 3-5 steps with a railing? 1  -OD 1  -EY    To walk in hospital room? 2  -OD 1  -EY    AM-PAC 6 Clicks Score (PT) 14  -OD 8  -EY    Highest Level of Mobility Goal 4 --> Transfer to chair/commode  -OD 3 --> Sit at edge of bed  -EY      Row Name 04/29/24 1551          Functional Assessment    Outcome Measure Options AM-PAC 6 Clicks Basic Mobility (PT)  -OD               User Key  (r) = Recorded By, (t) = Taken By, (c) = Cosigned By      Initials Name Provider Type    Georgie Askew RN Registered Nurse    Aracelis Mendes, PT  Physical Therapist                                 Physical Therapy Education       Title: PT OT SLP Therapies (Done)       Topic: Physical Therapy (Done)       Point: Mobility training (Done)       Learning Progress Summary             Patient Acceptance, E, VU by OD at 4/29/2024 1552                         Point: Home exercise program (Done)       Learning Progress Summary             Patient Acceptance, E, VU by OD at 4/29/2024 1552                         Point: Body mechanics (Done)       Learning Progress Summary             Patient Acceptance, E, VU by OD at 4/29/2024 1552                         Point: Precautions (Done)       Learning Progress Summary             Patient Acceptance, E, VU by OD at 4/29/2024 1552                                         User Key       Initials Effective Dates Name Provider Type Discipline    OD 05/11/23 -  Aracelis Ross, PT Physical Therapist PT                  PT Recommendation and Plan  Planned Therapy Interventions (PT): balance training, bed mobility training, gait training, home exercise program, neuromuscular re-education, ROM (range of motion), strengthening, stretching, patient/family education, postural re-education, transfer training  Plan of Care Reviewed With: patient  Progress: no change  Outcome Evaluation: Tha Barron (prefers Carlos) is a 67 y/o old M with PMH of COPD, diabetes mellitus, dyskinesia who presents to [facility] on 4/[facility] after a fall. Imaging shows nondisplaced fracture of the medial right acetabulum. Ortho consulted, recommending conservative management. Pt now NWB to RLE and will be for 6 weeks. At baseline, pt lives at a group home with 1STE and a flight of stairs down to his bedroom/bathroom. The living room and kitchen are on main floor. Pt currently is AAOx4 and demo understanding of weight-bearing precautions. Pt limited mostly to pain, but only requires minAx2 for bed mobility, STS, and transfer to bedside recliner. Pt likely to improve  well with continued education for NWB status and hop-to pattern for ambulation. Pt would benefit from SNF upon d/c for safe return to PLOF. PT will follow while admitted 5x/wk.     Time Calculation:         PT Charges       Row Name 04/29/24 1552             Time Calculation    Start Time 1057  -OD      Stop Time 1117  -OD      Time Calculation (min) 20 min  -OD      PT Received On 04/29/24  -OD      PT - Next Appointment 04/30/24  -OD      PT Goal Re-Cert Due Date 05/13/24  -OD         Time Calculation- PT    Total Timed Code Minutes- PT 0 minute(s)  -OD                User Key  (r) = Recorded By, (t) = Taken By, (c) = Cosigned By      Initials Name Provider Type    OD Aracelis Ross, PT Physical Therapist                  Therapy Charges for Today       Code Description Service Date Service Provider Modifiers Qty    02056666897 HC PT EVAL MOD COMPLEXITY 4 4/29/2024 Aracelis Ross, PT GP 1            PT G-Codes  Outcome Measure Options: AM-PAC 6 Clicks Basic Mobility (PT)  AM-PAC 6 Clicks Score (PT): 14  PT Discharge Summary  Anticipated Discharge Disposition (PT): skilled nursing facility    Aracelis Ross PT  4/29/2024

## 2024-04-29 NOTE — SIGNIFICANT NOTE
04/29/24 1449   Living Situation   Current Living Arrangements group home   Potentially Unsafe Housing Conditions none   Food Insecurity   Within the past 12 months, you worried that your food would run out before you got the money to buy more. Never true   Within the past 12 months, the food you bought just didn't last and you didn't have money to get more. Never true   Transportation Needs   In the past 12 months, has lack of transportation kept you from medical appointments or from getting medications? no   In the past 12 months, has lack of transportation kept you from meetings, work, or from getting things needed for daily living? No   Utilities   In the past 12 months has the electric, gas, oil, or water company threatened to shut off services in your home? No   Abuse Screen (yes response referral indicated)   Feels Unsafe at Home or Work/School no   Feels Threatened by Someone no   Does Anyone Try to Keep You From Having Contact with Others or Doing Things Outside Your Home? no   Physical Signs of Abuse Present no   Financial Resource Strain   How hard is it for you to pay for the very basics like food, housing, medical care, and heating? Not hard   Employment   Do you want help finding or keeping work or a job? I do not need or want help   Family and Community Support   If for any reason you need help with day-to-day activities such as bathing, preparing meals, shopping, managing finances, etc., do you get the help you need? I don't need any help   How often do you feel lonely or isolated from those around you? Never   Education   Preferred Language English   Do you want help with school or training? For example, starting or completing job training or getting a high school diploma, GED or equivalent No   Physical Activity   On average, how many days per week do you engage in moderate to strenuous exercise (like a brisk walk)? 0 days   On average, how many minutes do you engage in exercise at this level? 0  min   Number of minutes of exercise per week (!) 0   Alcohol Use   Q1: How often do you have a drink containing alcohol? Never   Q2: How many drinks containing alcohol do you have on a typical day when you are drinking? None   Q3: How often do you have six or more drinks on one occasion? Never   Stress   Do you feel stress - tense, restless, nervous, or anxious, or unable to sleep at night because your mind is troubled all the time - these days? Not at all   Mental Health   Little Interest or Pleasure in Doing Things 0-->not at all   Feeling Down, Depressed or Hopeless 0-->not at all   Disabilities   Concentrating, Remembering or Making Decisions Difficulty no   Doing Errands Independently Difficulty (such as shopping) yes  (patient does not drive so requires someone to transport and go with him)   Errands Management Leader of group home helps transport him

## 2024-04-29 NOTE — THERAPY EVALUATION
Patient Name: Tha Barron  : 1955    MRN: [id number]                              Today's Date: 2024       Admit Date: 2024    Visit Dx:     ICD-10-CM ICD-9-CM   1. Closed nondisplaced fracture of medial wall of right acetabulum, initial encounter  S32.474A 808.0     Patient Active Problem List   Diagnosis    Abdominal pain, acute, right upper quadrant    Acute cholecystitis    Fracture     Past Medical History:   Diagnosis Date    COPD (chronic obstructive pulmonary disease)     Diabetes mellitus     Myasthenia gravis     Tardive dyskinesia      Past Surgical History:   Procedure Laterality Date    CHOLECYSTECTOMY N/A 2/15/2023    Procedure: CHOLECYSTECTOMY LAPAROSCOPIC WITH DAVINCI ROBOT;  Surgeon: Tha Franklin MD;  Location: [facility] MAIN OR;  Service: Robotics - DaVinci;  Laterality: N/A;      General Information       Row Name 24 1607          OT Time and Intention    Document Type evaluation  -LS     Mode of Treatment occupational therapy  -LS       Row Name 24 1607          General Information    Patient Profile Reviewed yes  -LS     Prior Level of Function independent:;ADL's;all household mobility  -LS     Existing Precautions/Restrictions right;non-weight bearing;other (see comments)  RLE NWB  -LS       Row Name 24 1607          Living Environment    People in Home other (see comments)  Pt lives in a group home  -LS       Row Name 24 1607          Home Main Entrance    Number of Stairs, Main Entrance one  -LS       Row Name 24 1607          Stairs Within Home, Primary    Number of Stairs, Within Home, Primary twelve;other (see comments)  Upon entry to home, pt room downstairs and reports upstairs is Living Room and common area  -LS       Row Name 24 1607          Cognition    Orientation Status (Cognition) oriented x 4  -LS       Row Name 24 1607          Safety Issues, Functional Mobility    Impairments Affecting Function  (Mobility) pain;strength;endurance/activity tolerance;balance;other (see comments)  NWB  -LS               User Key  (r) = Recorded By, (t) = Taken By, (c) = Cosigned By      Initials Name Provider Type    Ottoniel See OT Occupational Therapist                     Mobility/ADL's       Row Name 04/29/24 1609          Bed Mobility    Bed Mobility bed mobility (all) activities  -     All Activities, [facility] (Bed Mobility) minimum assist (75% patient effort);2 person assist  -LS       Row Name 04/29/24 1609          Transfers    Transfers sit-stand transfer;bed-chair transfer  -       Row Name 04/29/24 1609          Bed-Chair Transfer    Bed-Chair [facility] (Transfers) minimum assist (75% patient effort);2 person assist  -LS     Assistive Device (Bed-Chair Transfers) walker, front-wheeled  -LS       Row Name 04/29/24 1609          Sit-Stand Transfer    Sit-Stand [facility] (Transfers) minimum assist (75% patient effort);2 person assist  -LS     Assistive Device (Sit-Stand Transfers) walker, front-wheeled  -LS       Row Name 04/29/24 1609          Functional Mobility    Patient was able to Ambulate yes  -[facility] Name 04/29/24 1609          Activities of Daily Living    BADL Assessment/Intervention lower body dressing  -       Row Name 04/29/24 1609          Mobility    Extremity Weight-bearing Status right lower extremity  -LS     Right Lower Extremity (Weight-bearing Status) non weight-bearing (NWB)  -       Row Name 04/29/24 1609          Lower Body Dressing Assessment/Training    [facility] Level (Lower Body Dressing) lower body dressing skills;maximum assist (25% patient effort)  -               User Key  (r) = Recorded By, (t) = Taken By, (c) = Cosigned By      Initials Name Provider Type    Ottoniel See OT Occupational Therapist                   Obj/Interventions       Row Name 04/29/24 1615          Sensory Assessment (Somatosensory)    Sensory Assessment (Somatosensory)  sensation intact  -       Row Name 04/29/24 1615          Range of Motion Comprehensive    General Range of Motion bilateral upper extremity ROM WFL  -       Row Name 04/29/24 1615          Strength Comprehensive (MMT)    Comment, General Manual Muscle Testing (MMT) Assessment BUEs grossly 4/5  -       Row Name 04/29/24 1615          Balance    Balance Assessment sitting static balance;sitting dynamic balance;standing static balance;standing dynamic balance  -LS     Static Sitting Balance modified independence  -LS     Dynamic Sitting Balance modified independence  -LS     Position, Sitting Balance sitting edge of bed  -LS     Static Standing Balance minimal assist  -LS     Dynamic Standing Balance minimal assist  -LS     Position/Device Used, Standing Balance walker, front-wheeled  -LS               User Key  (r) = Recorded By, (t) = Taken By, (c) = Cosigned By      Initials Name Provider Type    Ottoniel See OT Occupational Therapist                   Goals/Plan       Row Name 04/29/24 1621          Bed Mobility Goal 1 (OT)    Activity/Assistive Device (Bed Mobility Goal 1, OT) bed mobility activities, all  -LS     [place] Level/Cues Needed (Bed Mobility Goal 1, OT) modified independence  -LS     Time Frame (Bed Mobility Goal 1, OT) long term goal (LTG);2 weeks  -       Row Name 04/29/24 1621          Transfer Goal 1 (OT)    Activity/Assistive Device (Transfer Goal 1, OT) transfers, all  -LS     [place] Level/Cues Needed (Transfer Goal 1, OT) modified independence  -LS     Time Frame (Transfer Goal 1, OT) long term goal (LTG);2 weeks  -[facility] Name 04/29/24 1621          Dressing Goal 1 (OT)    Activity/Device (Dressing Goal 1, OT) dressing skills, all  -LS     [place]/Cues Needed (Dressing Goal 1, OT) modified independence  -LS     Time Frame (Dressing Goal 1, OT) long term goal (LTG);2 weeks  -       Row Name 04/29/24 1621          Toileting Goal 1 (OT)    Activity/Device  (Toileting Goal 1, OT) toileting skills, all  -LS     [place] Level/Cues Needed (Toileting Goal 1, OT) modified independence  -LS     Time Frame (Toileting Goal 1, OT) long term goal (LTG);2 weeks  -LS       Row Name 04/29/24 1621          Therapy Assessment/Plan (OT)    Planned Therapy Interventions (OT) activity tolerance training;BADL retraining;functional balance retraining;IADL retraining;occupation/activity based interventions;ROM/therapeutic exercise;strengthening exercise;transfer/mobility retraining;patient/caregiver education/training  -LS               User Key  (r) = Recorded By, (t) = Taken By, (c) = Cosigned By      Initials Name Provider Type    LS Ottoniel Calderon OT Occupational Therapist                   Clinical Impression       Row Name 04/29/24 1617          Pain Assessment    Pretreatment Pain Rating 6/10  -LS     Posttreatment Pain Rating 8/10  -LS     Pain Location - Side/Orientation Right  -LS     Pain Location - hip  -LS       Row Name 04/29/24 1617          Plan of Care Review    Plan of Care Reviewed With patient  -LS     Outcome Evaluation 68 y.o. old male patient with PMH of COPD, diabetes mellitus, dyskinesia, presents to the hospital 4/27 after a mechanical fall with right hip pain. Imaging shows there is nondisplaced fracture of the medial right acetabulum. Ortho consulted, recommending conservative management. Pt now NWB to RLE and will be for 6 weeks. At baseline, pt lives at a group home with 1STE and a flight of stairs down to his bedroom/bathroom. The living room and kitchen are on main floor. Pt currently is AAOx4 and demo understanding of weight-bearing precautions. Pt with increased pain with movement. Min Ax2 for bed mobility, sit to stand, and stand pivot to chair. He did do well with NWB on RLE, but was unable to ambulate beyond the transfer today secondary to pain. OT feels pt most appropriate for SNF until he is cleared for WBing on RLE. Will follow.  -LS       Row  Name 04/29/24 1617          Therapy Assessment/Plan (OT)    Rehab Potential (OT) good, to achieve stated therapy goals  -LS     Criteria for Skilled Therapeutic Interventions Met (OT) yes;skilled treatment is necessary  -LS     Therapy Frequency (OT) 5 times/wk  -LS     Predicted Duration of Therapy Intervention (OT) until dc  -LS       Row Name 04/29/24 1617          Therapy Plan Review/Discharge Plan (OT)    Anticipated Discharge Disposition (OT) skilled nursing facility  -       Row Name 04/29/24 1617          Vital Signs    O2 Delivery Pre Treatment room air  -LS     O2 Delivery Intra Treatment room air  -LS     O2 Delivery Post Treatment room air  -LS     Pre Patient Position Supine  -LS     Intra Patient Position Standing  -LS     Post Patient Position Sitting  -LS       Row Name 04/29/24 1617          Positioning and Restraints    Pre-Treatment Position in bed  -LS     Post Treatment Position chair  -LS     In Chair notified nsg;reclined;call light within reach;encouraged to call for assist;exit alarm on  -LS               User Key  (r) = Recorded By, (t) = Taken By, (c) = Cosigned By      Initials Name Provider Type    LS Ottoniel Calderon, OT Occupational Therapist                   Outcome Measures       Row Name 04/29/24 1621          How much help from another is currently needed...    Putting on and taking off regular lower body clothing? 2  -LS     Bathing (including washing, rinsing, and drying) 2  -LS     Toileting (which includes using toilet bed pan or urinal) 2  -LS     Putting on and taking off regular upper body clothing 3  -LS     Taking care of personal grooming (such as brushing teeth) 4  -LS     Eating meals 4  -LS     AM-PAC 6 Clicks Score (OT) 17  -LS       Row Name 04/29/24 1551 04/29/24 0855       How much help from another person do you currently need...    Turning from your back to your side while in flat bed without using bedrails? 3  -OD 2  -EY    Moving from lying on back to sitting  on the side of a flat bed without bedrails? 3  -OD 2  -EY    Moving to and from a bed to a chair (including a wheelchair)? 3  -OD 1  -EY    Standing up from a chair using your arms (e.g., wheelchair, bedside chair)? 2  -OD 1  -EY    Climbing 3-5 steps with a railing? 1  -OD 1  -EY    To walk in hospital room? 2  -OD 1  -EY    AM-PAC 6 Clicks Score (PT) 14  -OD 8  -EY    Highest Level of Mobility Goal 4 --> Transfer to chair/commode  -OD 3 --> Sit at edge of bed  -EY      Row Name 04/29/24 1621 04/29/24 1551       Functional Assessment    Outcome Measure Options AM-PAC 6 Clicks Daily Activity (OT)  -LS AM-PAC 6 Clicks Basic Mobility (PT)  -OD              User Key  (r) = Recorded By, (t) = Taken By, (c) = Cosigned By      Initials Name Provider Type    EY Georgie Solares, RN Registered Nurse    Ottoniel See, OT Occupational Therapist    OD Aracelis Ross, PT Physical Therapist                    Occupational Therapy Education       Title: PT OT SLP Therapies (Done)       Topic: Occupational Therapy (Done)       Point: ADL training (Done)       Description:   Instruct learner(s) on proper safety adaptation and remediation techniques during self care or transfers.   Instruct in proper use of assistive devices.                  Learning Progress Summary             Patient Acceptance, E,TB, VU by  at 4/29/2024 1621                         Point: Precautions (Done)       Description:   Instruct learner(s) on prescribed precautions during self-care and functional transfers.                  Learning Progress Summary             Patient Acceptance, E,TB, VU by ANGELA at 4/29/2024 1621                         Point: Body mechanics (Done)       Description:   Instruct learner(s) on proper positioning and spine alignment during self-care, functional mobility activities and/or exercises.                  Learning Progress Summary             Patient Acceptance, E,TB, VU by ANGELA at 4/29/2024 1621                                          User Key       Initials Effective Dates Name Provider Type Discipline     09/22/22 -  Ottoniel Calderon OT Occupational Therapist OT                  OT Recommendation and Plan  Planned Therapy Interventions (OT): activity tolerance training, BADL retraining, functional balance retraining, IADL retraining, occupation/activity based interventions, ROM/therapeutic exercise, strengthening exercise, transfer/mobility retraining, patient/caregiver education/training  Therapy Frequency (OT): 5 times/wk  Plan of Care Review  Plan of Care Reviewed With: patient  Outcome Evaluation: 68 y.o. old male patient with PMH of COPD, diabetes mellitus, dyskinesia, presents to the hospital 4/27 after a mechanical fall with right hip pain. Imaging shows there is nondisplaced fracture of the medial right acetabulum. Ortho consulted, recommending conservative management. Pt now NWB to RLE and will be for 6 weeks. At baseline, pt lives at a group home with 1STE and a flight of stairs down to his bedroom/bathroom. The living room and kitchen are on main floor. Pt currently is AAOx4 and demo understanding of weight-bearing precautions. Pt with increased pain with movement. Min Ax2 for bed mobility, sit to stand, and stand pivot to chair. He did do well with NWB on RLE, but was unable to ambulate beyond the transfer today secondary to pain. OT feels pt most appropriate for SNF until he is cleared for WBing on RLE. Will follow.     Time Calculation:         Time Calculation- OT       Row Name 04/29/24 1621             Time Calculation- OT    OT Start Time 1057  -LS      OT Stop Time 1118  -      OT Time Calculation (min) 21 min  -LS      OT Received On 04/29/24  -      OT - Next Appointment 04/30/24  -      OT Goal Re-Cert Due Date 05/13/24  -         Untimed Charges    OT Eval/Re-eval Minutes 21  -LS         Total Minutes    Untimed Charges Total Minutes 21  -LS       Total Minutes 21  -LS                User Key  (r) =  Recorded By, (t) = Taken By, (c) = Cosigned By      Initials Name Provider Type     Ottoniel Calderon OT Occupational Therapist                  Therapy Charges for Today       Code Description Service Date Service Provider Modifiers Qty    54469002515 HC OT EVAL MOD COMPLEXITY 4 4/29/2024 Ottoniel Calderon OT GO 1                 Ottoniel Calderon OT  4/29/2024

## 2024-04-29 NOTE — DISCHARGE PLACEMENT REQUEST
"Ion Barron (68 y.o. Male)       Date of Birth   1955    Social Security Number       Address   [place] [address]    Home Phone   [phone]    MRN   [id number]       Zoroastrian   None    Marital Status   Single                            Admission Date   4/27/24    Admission Type   Emergency    Admitting Provider   Gillian Leal MD    Attending Provider   Blanca Mcclendon MD    Department, Room/Bed   [facility] SURGICAL INPATIENT, 4119/1       Discharge Date       Discharge Disposition       Discharge Destination                                 Attending Provider: Blanca Mcclendon MD    Allergies: No Known Allergies    Isolation: Contact   Infection: Candida Auris (rule out) (04/27/24)   Code Status: CPR    Ht: 190.5 cm (75\")   Wt: 125 kg (275 lb 9.2 oz)    Admission Cmt: None   Principal Problem: Fracture [T14.8XXA]                   Active Insurance as of 4/27/2024       Primary Coverage       Payor Plan Insurance Group Employer/Plan Group    [employer] [id number]       Payor Plan Address Payor Plan Phone Number Payor Plan Fax Number Effective Dates    MAIL STOP:   4/1/2017 - None Entered    [address] [address]       [facility] [address]         Subscriber Name Subscriber Birth Date Member ID       ION BARRON 1955 [id number]                     Emergency Contacts        (Rel.) Home Phone Work Phone Mobile Phone    Kinjal Varela (Sister) [phone] -- [phone]                "

## 2024-04-29 NOTE — PROGRESS NOTES
[facility]     Progress Note    Patient Name: Tha Barron  : 1955  MRN: [id number]  Primary Care Physician:  Provider, No Known  Date of admission: 2024  Service date and time: 24 10:06 EDT  Subjective   Subjective     Chief Complaint: Fracture    HPI:  Patient seen and examined this morning, patient laying on the bed comfortably.  Patient has no complaint today.    Denies chest pain, shortness of breath, nausea and vomiting    Disposition: Awaiting placement      Objective   Objective     Vitals:   Temp:  [97.6 °F (36.4 °C)-98.8 °F (37.1 °C)] 97.6 °F (36.4 °C)  Resp:  [16-18] 16  BP: (110-128)/(59-79) 128/79  Physical Exam    Constitutional: Awake, alert    Respiratory: Clear to auscultation bilaterally, nonlabored respirations    Cardiovascular: RRR, no murmurs, rubs, or gallops, palpable pedal pulses bilaterally   Gastrointestinal: Positive bowel sounds, soft, nontender, nondistended   Musculoskeletal: No bilateral ankle edema, no clubbing or cyanosis to extremities   Psychiatric: Appropriate affect, cooperative   Neurologic: Oriented x 3, speech clear   Skin: No rashes     Result Review    Result Review:  I have personally reviewed the results from the time of this admission to 2024 10:06 EDT and agree with these findings:  [x]  Laboratory list / accordion  []  Microbiology  [x]  Radiology  []  EKG/Telemetry   []  Cardiology/Vascular   []  Pathology  []  Old records  []  Other:        Assessment & Plan   Assessment / Plan       Active Hospital Problems:  Active Hospital Problems    Diagnosis     **Fracture      Plan:      #Mechanical fall complicated with right acetabular nondisplaced fracture  -Patient presented with mechanical fall and after that he heard popping sound and started having right hip pain.  -X-ray showing nondisplaced fracture of the medial right acetabulum.  -Orthopedic surgeon consulted and recommended nonsurgical intervention nonbearing weight for 6  weeks  -Pain management.  -Fall precaution.  -PT OT .         #COPD, not in exacerbation  -Respiratory treatment oxygen supplement as needed     # Diabetes mellitus  -Accu-Chek SSI    DVT prophylaxis:  Mechanical DVT prophylaxis orders are present.        CODE STATUS:   Code Status (Patient has no pulse and is not breathing): CPR (Attempt to Resuscitate)  Medical Interventions (Patient has pulse or is breathing): Full Support    Disposition:  I expect patient to be discharged 24 to 40 hours.    Blanca Mcclendon MD

## 2024-04-29 NOTE — PLAN OF CARE
Goal Outcome Evaluation:  Plan of Care Reviewed With: patient        Progress: no change  Outcome Evaluation: Tha Barron (prefers Carlos) is a 67 y/o old M with PMH of COPD, diabetes mellitus, dyskinesia who [facility] after a fall. Imaging shows nondisplaced fracture of the medial right acetabulum. Ortho consulted, recommending conservative management. Pt now NWB to RLE and will be for 6 weeks. At baseline, pt lives at a group home with 1STE and a flight of stairs down to his bedroom/bathroom. The living room and kitchen are on main floor. Pt currently is AAOx4 and demo understanding of weight-bearing precautions. Pt limited mostly to pain, but only requires minAx2 for bed mobility, STS, and transfer to bedside recliner. Pt likely to improve well with continued education for NWB status and hop-to pattern for ambulation. Pt would benefit from SNF upon d/c for safe return to PLOF. PT will follow while admitted 5x/wk.      Anticipated Discharge Disposition (PT): skilled nursing facility

## 2024-04-29 NOTE — PLAN OF CARE
Goal Outcome Evaluation:  Plan of Care Reviewed With: patient           Outcome Evaluation: 68 y.o. old male patient with PMH of COPD, diabetes mellitus, dyskinesia, presents to the hospital 4/27 after a mechanical fall with right hip pain. Imaging shows there is nondisplaced fracture of the medial right acetabulum. Ortho consulted, recommending conservative management. Pt now NWB to RLE and will be for 6 weeks. At baseline, pt lives at a group home with 1STE and a flight of stairs down to his bedroom/bathroom. The living room and kitchen are on main floor. Pt currently is AAOx4 and demo understanding of weight-bearing precautions. Pt with increased pain with movement. Min Ax2 for bed mobility, sit to stand, and stand pivot to chair. He did do well with NWB on RLE, but was unable to ambulate beyond the transfer today secondary to pain. OT feels pt most appropriate for SNF until he is cleared for WBing on RLE. Will follow.      Anticipated Discharge Disposition (OT): skilled nursing facility

## 2024-04-29 NOTE — PLAN OF CARE
Goal Outcome Evaluation:         Patient pain treated per MAR, was able to tolerate sitting in chair this AM, able to makes needs known, care plan ongoing

## 2024-04-29 NOTE — CASE MANAGEMENT/SOCIAL WORK
Discharge Planning Assessment   Ran     Patient Name: Tha Barron  MRN: [id number]  Today's Date: 4/29/2024    Admit Date: 4/27/2024    Plan: Lives at group home.  Will likely need rehab. Pending PT OT bridger.   Discharge Needs Assessment       Row Name 04/29/24 1439       Living Environment    People in Home other (see comments)  Lives in group home    Current Living Arrangements group home    Potentially Unsafe Housing Conditions none    In the past 12 months has the electric, gas, oil, or water company threatened to shut off services in your home? No    Primary Care Provided by self    Provides Primary Care For no one    Family Caregiver if Needed other (see comments)  leader of group home Horace Méndez  [phone]    Quality of Family Relationships helpful;involved;supportive    Able to Return to Prior Arrangements yes       Resource/Environmental Concerns    Resource/Environmental Concerns none    Transportation Concerns none       Transportation Needs    In the past 12 months, has lack of transportation kept you from medical appointments or from getting medications? no    In the past 12 months, has lack of transportation kept you from meetings, work, or from getting things needed for daily living? No       Food Insecurity    Within the past 12 months, you worried that your food would run out before you got the money to buy more. Never true    Within the past 12 months, the food you bought just didn't last and you didn't have money to get more. Never true       Transition Planning    Patient/Family Anticipates Transition to inpatient rehabilitation facility    Patient/Family Anticipated Services at Transition skilled nursing;rehabilitation services    Transportation Anticipated family or friend will provide       Discharge Needs Assessment    Readmission Within the Last 30 Days no previous admission in last 30 days    Current Outpatient/Agency/Support Group group home    Equipment Currently Used  at Home none    Concerns to be Addressed care coordination/care conferences;discharge planning    Anticipated Changes Related to Illness none    Equipment Needed After Discharge none    Outpatient/Agency/Support Group Needs skilled nursing facility;inpatient rehabilitation facility    Discharge Facility/Level of Care Needs nursing facility, skilled;rehabilitation facility    Provided Post Acute Provider List? Yes    Post Acute Provider List Nursing Home;Inpatient Rehab    Delivered To Patient    Method of Delivery In person    Patient's Choice of Community Agency(s) [facility], [facility]                   Discharge Plan       Row Name 04/29/24 1442       Plan    Plan Lives at group home.  Will likely need rehab. Pending PT OT eval.    Patient/Family in Agreement with Plan yes    Plan Comments Patient lives in group home in [place].   Patient does not drive. Will need help with transport at discharge.   Patient performs ADL. PCP and pharmacy confirmed. Denies financial assistance needs for medication and/or food. Will likely need rehab prior to returning to group home.  Patient is agreeable.   If PT recommends acute rehab he would like to go to [facility] if snf then [facility]                  Continued Care and Services - Admitted Since 4/27/2024    No active coordination exists for this encounter.       Expected Discharge Date and Time       Expected Discharge Date Expected Discharge Time    May 1, 2024            Demographic Summary       Row Name 04/29/24 1438       General Information    Admission Type inpatient    Arrived From emergency department    Referral Source admission list    Reason for Consult discharge planning    Preferred Language English       Contact Information    Permission Granted to Share Info With                    Functional Status       Row Name 04/29/24 1439       Functional Status    Usual Activity Tolerance good     Current Activity Tolerance good       Functional Status, IADL    Medications assistive person    Meal Preparation independent    Housekeeping independent    Laundry independent    Shopping assistive person       Mental Status    General Appearance WDL WDL       Mental Status Summary    Recent Changes in Mental Status/Cognitive Functioning no changes                    Sheela Ruiz, RN

## 2024-04-30 LAB
GLUCOSE BLDC GLUCOMTR-MCNC: 124 MG/DL (ref 70–105)
GLUCOSE BLDC GLUCOMTR-MCNC: 140 MG/DL (ref 70–105)
GLUCOSE BLDC GLUCOMTR-MCNC: 152 MG/DL (ref 70–105)
GLUCOSE BLDC GLUCOMTR-MCNC: 169 MG/DL (ref 70–105)

## 2024-04-30 PROCEDURE — 82948 REAGENT STRIP/BLOOD GLUCOSE: CPT

## 2024-04-30 PROCEDURE — 25010000002 HYDROMORPHONE 1 MG/ML SOLUTION: Performed by: HOSPITALIST

## 2024-04-30 PROCEDURE — 97530 THERAPEUTIC ACTIVITIES: CPT

## 2024-04-30 PROCEDURE — 25010000002 MORPHINE PER 10 MG: Performed by: STUDENT IN AN ORGANIZED HEALTH CARE EDUCATION/TRAINING PROGRAM

## 2024-04-30 PROCEDURE — 97110 THERAPEUTIC EXERCISES: CPT

## 2024-04-30 PROCEDURE — 82948 REAGENT STRIP/BLOOD GLUCOSE: CPT | Performed by: INTERNAL MEDICINE

## 2024-04-30 PROCEDURE — 63710000001 INSULIN LISPRO (HUMAN) PER 5 UNITS: Performed by: INTERNAL MEDICINE

## 2024-04-30 RX ORDER — CYCLOBENZAPRINE HCL 10 MG
10 TABLET ORAL 3 TIMES DAILY PRN
Status: DISCONTINUED | OUTPATIENT
Start: 2024-04-30 | End: 2024-05-03 | Stop reason: HOSPADM

## 2024-04-30 RX ORDER — BISACODYL 5 MG/1
5 TABLET, DELAYED RELEASE ORAL DAILY PRN
Status: DISCONTINUED | OUTPATIENT
Start: 2024-04-30 | End: 2024-05-03 | Stop reason: HOSPADM

## 2024-04-30 RX ORDER — HYDROCODONE BITARTRATE AND ACETAMINOPHEN 5; 325 MG/1; MG/1
1 TABLET ORAL EVERY 4 HOURS PRN
Status: DISCONTINUED | OUTPATIENT
Start: 2024-04-30 | End: 2024-05-03 | Stop reason: HOSPADM

## 2024-04-30 RX ORDER — BISACODYL 10 MG
10 SUPPOSITORY, RECTAL RECTAL DAILY PRN
Status: DISCONTINUED | OUTPATIENT
Start: 2024-04-30 | End: 2024-05-03 | Stop reason: HOSPADM

## 2024-04-30 RX ORDER — POLYETHYLENE GLYCOL 3350 17 G/17G
17 POWDER, FOR SOLUTION ORAL 2 TIMES DAILY
Status: DISCONTINUED | OUTPATIENT
Start: 2024-04-30 | End: 2024-05-03 | Stop reason: HOSPADM

## 2024-04-30 RX ORDER — HYDROCODONE BITARTRATE AND ACETAMINOPHEN 5; 325 MG/1; MG/1
2 TABLET ORAL EVERY 4 HOURS PRN
Status: DISCONTINUED | OUTPATIENT
Start: 2024-04-30 | End: 2024-05-03 | Stop reason: HOSPADM

## 2024-04-30 RX ORDER — AMOXICILLIN 250 MG
2 CAPSULE ORAL NIGHTLY
Status: DISCONTINUED | OUTPATIENT
Start: 2024-04-30 | End: 2024-05-03 | Stop reason: HOSPADM

## 2024-04-30 RX ADMIN — HYDROCODONE BITARTRATE AND ACETAMINOPHEN 2 TABLET: 5; 325 TABLET ORAL at 17:10

## 2024-04-30 RX ADMIN — HYDROCODONE BITARTRATE AND ACETAMINOPHEN 2 TABLET: 5; 325 TABLET ORAL at 12:30

## 2024-04-30 RX ADMIN — CYCLOBENZAPRINE 10 MG: 10 TABLET, FILM COATED ORAL at 21:48

## 2024-04-30 RX ADMIN — INSULIN LISPRO 2 UNITS: 100 INJECTION, SOLUTION INTRAVENOUS; SUBCUTANEOUS at 20:51

## 2024-04-30 RX ADMIN — PANTOPRAZOLE SODIUM 40 MG: 40 TABLET, DELAYED RELEASE ORAL at 06:10

## 2024-04-30 RX ADMIN — EMPAGLIFLOZIN 10 MG: 10 TABLET, FILM COATED ORAL at 08:13

## 2024-04-30 RX ADMIN — BUPROPION HYDROCHLORIDE 300 MG: 150 TABLET, EXTENDED RELEASE ORAL at 06:10

## 2024-04-30 RX ADMIN — SERTRALINE 100 MG: 100 TABLET, FILM COATED ORAL at 06:10

## 2024-04-30 RX ADMIN — INSULIN LISPRO 2 UNITS: 100 INJECTION, SOLUTION INTRAVENOUS; SUBCUTANEOUS at 12:00

## 2024-04-30 RX ADMIN — HYDROMORPHONE HYDROCHLORIDE 1 MG: 1 INJECTION, SOLUTION INTRAMUSCULAR; INTRAVENOUS; SUBCUTANEOUS at 17:49

## 2024-04-30 RX ADMIN — ZOLPIDEM TARTRATE 10 MG: 5 TABLET ORAL at 21:48

## 2024-04-30 RX ADMIN — HYDROMORPHONE HYDROCHLORIDE 1 MG: 1 INJECTION, SOLUTION INTRAMUSCULAR; INTRAVENOUS; SUBCUTANEOUS at 09:58

## 2024-04-30 RX ADMIN — HYDROCODONE BITARTRATE AND ACETAMINOPHEN 2 TABLET: 5; 325 TABLET ORAL at 08:10

## 2024-04-30 RX ADMIN — BISACODYL 5 MG: 5 TABLET, COATED ORAL at 08:10

## 2024-04-30 RX ADMIN — Medication 10 ML: at 20:51

## 2024-04-30 RX ADMIN — MORPHINE SULFATE 2 MG: 2 INJECTION, SOLUTION INTRAMUSCULAR; INTRAVENOUS at 06:10

## 2024-04-30 RX ADMIN — CYCLOBENZAPRINE 10 MG: 10 TABLET, FILM COATED ORAL at 13:44

## 2024-04-30 RX ADMIN — HYDROCODONE BITARTRATE AND ACETAMINOPHEN 2 TABLET: 5; 325 TABLET ORAL at 21:48

## 2024-04-30 RX ADMIN — TRAZODONE HYDROCHLORIDE 100 MG: 100 TABLET ORAL at 21:48

## 2024-04-30 RX ADMIN — ATORVASTATIN CALCIUM 20 MG: 20 TABLET, FILM COATED ORAL at 08:10

## 2024-04-30 RX ADMIN — POLYETHYLENE GLYCOL 3350 17 G: 17 POWDER, FOR SOLUTION ORAL at 08:25

## 2024-04-30 NOTE — CASE MANAGEMENT/SOCIAL WORK
Continued Stay Note  [facility]     Patient Name: Tha Barron  MRN: [id number]  Today's Date: 4/30/2024    Admit Date: 4/27/2024    Plan: Referral to [facility] pending acceptance and precert. Will need PASRR   Discharge Plan       Row Name 04/30/24 1126       Plan    Plan Referral to [facility] pending acceptance and precert. Will need PASRR    Plan Comments dc BARRIERS: nondisplaced fx of medial rt acetabulum.  No surgery planned  NWB.  Will need snf as he lives at a group home with multiple floors.   [facility] unable to accept patient.  Next choices are   [facility], [facility], [facility]. [facility].  Referral sent to  [facility] pending acceptance, precert and PASRR .                 Expected Discharge Date and Time       Expected Discharge Date Expected Discharge Time    May 1, 2024             Sheela Ruiz RN

## 2024-04-30 NOTE — THERAPY TREATMENT NOTE
"Subjective: Pt agreeable to therapeutic plan of care.    Weight Bearing Status: NWB RLE    Objective:     Bed mobility - Supine to sitting Min-A for RLE support.     Transfers - Pt attempted STS from EOB; however, unable to CTS while complying with NWB RLE. Pt performed SqPS transfer from elevated EOB to recliner chair on L side mod A, with PTA ensuring NWB compliance on RLE.     Ambulation - 0 feet N/A or Not attempted.    Therapeutic Exercise - 15 Reps B LE AAROM lying supine and supported sitting / chair    Vitals: WNL    Pain: 7 VAS   Location: RLE  Intervention for pain: Repositioned, Increased Activity, and Therapeutic Presence    Education: Provided education on the importance of mobility in the acute care setting, Verbal/Tactile Cues, Transfer Training, and WB'ing status    Assessment: Tha Barron presents with functional mobility impairments which indicate the need for skilled intervention. Tolerating session today without incident. Pt reports pain is not well controlled. Min A required for bed mobility. Pt unable to come to full stand from EOB while complying with NWB status. SqPS transfer from EOB to recliner chair on L side mod A. Pt tolerated AAROM therex well on RLE without significant increase in pain. Will continue to follow and progress as tolerated.     Plan/Recommendations:   If medically appropriate, Moderate Intensity Therapy recommended post-acute care. This is recommended as therapy feels the patient would require 3-4 days per week and wouldn't tolerate \"3 hour daily\" rehab intensity. SNF would be the preferred choice. If the patient does not agree to SNF, arrange HH or OP depending on home bound status. If patient is medically complex, consider LTACH. Pt requires no DME at discharge.     Pt desires Skilled Rehab placement at discharge. Pt cooperative; agreeable to therapeutic recommendations and plan of care.         Basic Mobility 6-click:  Rollin = Total, A lot = 2, A " little = 3; 4 = None  Supine>Sit:   1 = Total, A lot = 2, A little = 3; 4 = None   Sit>Stand with arms:  1 = Total, A lot = 2, A little = 3; 4 = None  Bed>Chair:   1 = Total, A lot = 2, A little = 3; 4 = None  Ambulate in room:  1 = Total, A lot = 2, A little = 3; 4 = None  3-5 Steps with railin = Total, A lot = 2, A little = 3; 4 = None  Score: 11    Modified Maile: N/A = No pre-op stroke/TIA    Post-Tx Position: Up in Chair, Alarms activated, and Call light and personal items within reach  PPE: gloves and gown

## 2024-04-30 NOTE — DISCHARGE PLACEMENT REQUEST
"Ion Barron (68 y.o. Male)       Date of Birth   1955    Social Security Number       Address   [address] [place] [place] [address]    Home Phone   [phone]    MRN   [id number]       Christian   None    Marital Status   Single                            Admission Date   24    Admission Type   Emergency    Admitting Provider   Gillian Leal MD    Attending Provider   Sage Arias MD    Department, Room/Bed   [facility] SURGICAL INPATIENT,        Discharge Date       Discharge Disposition       Discharge Destination                                 Attending Provider: Sage Arias MD    Allergies: No Known Allergies    Isolation: Contact   Infection: Candida Auris (rule out) (24)   Code Status: CPR    Ht: 190.5 cm (75\")   Wt: 125 kg (275 lb 9.2 oz)    Admission Cmt: None   Principal Problem: Fracture [T14.8XXA]                   Active Insurance as of 2024       Primary Coverage       Payor Plan Insurance Group Employer/Plan Group    [employer] [place] [employer] [id number]       Payor Plan Address Payor Plan Phone Number Payor Plan Fax Number Effective Dates    MAIL STOP:   2017 - None Entered    [address] [address]       [facility] [address]         Subscriber Name Subscriber Birth Date Member ID       ION BARRON 1955 [id number]                     Emergency Contacts        (Rel.) Home Phone Work Phone Mobile Phone    Kinjal Varela (Sister) [phone] -- [phone]                 History & Physical        Gillian Leal MD at 24 1359            History and Physical   Ion Barron : 1955 MRN:[id number] LOS:0     Reason for admission: Fracture     Assessment / Plan     #Mechanical fall complicated with right acetabular nondisplaced fracture  -Patient presented with mechanical fall and after that he heard popping sound and started having right hip " pain.  -X-ray showing nondisplaced fracture of the medial right acetabulum.  -Orthopedic surgeon consulted.  -Pain management.  -Fall precaution.  -PT OT .  -To follow-up for lab test.  Pending for now.    #COPD, not in exacerbation  -Respiratory treatment oxygen supplement as needed    # Diabetes mellitus  -Accu-Chek SSI    Code Status (Patient has no pulse and is not breathing): CPR (Attempt to Resuscitate)  Medical Interventions (Patient has pulse or is breathing): Full Support       Nutrition: NPO Diet NPO Type: Strict NPO     DVT Prophylaxis:   Mechanical Order History:        Ordered        04/27/24 1334  Place Sequential Compression Device  Once            04/27/24 1334  Maintain Sequential Compression Device  Continuous                          Pharmalogical Order History:       None             History of Present illness     A 68 y.o. old male patient with PMH of COPD diabetes mellitus dyskinesia presents to the hospital with complaints of mechanical fall.  Does not have any headache dizziness blurred vision significant weakness prior to the event.  Patient does not hit the head.  Patient has a skin abrasion on the left forearm.  Other mechanical fall he heard a popping sound and started having right hip pain.  In the ED patient hemodynamically stable in the history showing there is nondisplaced fracture of the medial right acetabulum.  Patient has right hip arthroplasty many years ago.  Orthopedic surgeon consulted.     Patient will be admitted for right acetabulum fracture management.    Subjective / Review of systems     Review of Systems   And in the right hip.  No chest pain shortness of breath abdominal pain nausea vomiting reported.      Past Medical/Surgical/Social/Family History & Allergies     Past Medical History:   Diagnosis Date    COPD (chronic obstructive pulmonary disease)     Diabetes mellitus     Tardive dyskinesia       Past Surgical History:   Procedure Laterality Date     CHOLECYSTECTOMY N/A 2/15/2023    Procedure: CHOLECYSTECTOMY LAPAROSCOPIC WITH DAVINCI ROBOT;  Surgeon: Tha Franklin MD;  Location: [facility] MAIN OR;  Service: Robotics - DaVinci;  Laterality: N/A;      Social History     Socioeconomic History    Marital status: Single   Tobacco Use    Smoking status: Every Day     Current packs/day: 1.00     Average packs/day: 1 pack/day for 40.0 years (40.0 ttl pk-yrs)     Types: Cigarettes    Smokeless tobacco: Never   Vaping Use    Vaping status: Never Used    Passive vaping exposure: Yes   Substance and Sexual Activity    Alcohol use: Never    Drug use: Never    Sexual activity: Not Currently      No family history on file.   No Known Allergies     Home Medications     Prior to Admission medications    Medication Sig Start Date End Date Taking? Authorizing Provider   Aspirin Low Dose 81 MG EC tablet Take 1 tablet by mouth Daily. 4/1/24  Yes Teodoro Brooks MD   atorvastatin (LIPITOR) 20 MG tablet Take 1 tablet by mouth Every Night. 1/20/23  Yes Teodoro Brooks MD   buPROPion XL (WELLBUTRIN XL) 300 MG 24 hr tablet Take 1 tablet by mouth Every Morning. 1/20/23  Yes Teodoro Brooks MD   Farxiga 10 MG tablet Take 10 mg by mouth Daily. 4/1/24  Yes Teodoro Brooks MD   insulin glargine (LANTUS, SEMGLEE) 100 UNIT/ML injection Inject 10 Units under the skin into the appropriate area as directed Daily.   Yes Teodoro Brooks MD   lisinopril (PRINIVIL,ZESTRIL) 40 MG tablet Take 1 tablet by mouth Daily. 4/1/24  Yes Teodoro Brooks MD   meloxicam (MOBIC) 15 MG tablet Take 1 tablet by mouth Daily. 3/8/24  Yes Teodoro Brooks MD   metFORMIN (GLUCOPHAGE) 1000 MG tablet Take 1 tablet by mouth 2 (Two) Times a Day. 1/20/23  Yes Teodoro Brooks MD   omeprazole (priLOSEC) 40 MG capsule Take 1 capsule by mouth Daily.   Yes Teodoro Brooks MD   predniSONE (DELTASONE) 20 MG tablet Take 1 tablet by mouth Daily.   Yes Teodoro Brooks  MD   pyridostigmine (MESTINON) 180 MG CR tablet Take 1 tablet by mouth 2 (Two) Times a Day.   Yes Teodoro Brooks MD   sertraline (ZOLOFT) 100 MG tablet Take 1 tablet by mouth Every Morning. 1/20/23  Yes Teodoro Brooks MD   traZODone (DESYREL) 100 MG tablet Take 2 tablets by mouth Every Night. 1/20/23  Yes Teodoro Brooks MD   Valbenazine Tosylate 80 MG capsule Take 80 mg by mouth Every Morning. 6/29/17  Yes Teodoro Brooks MD   zolpidem (AMBIEN) 10 MG tablet Take 1 tablet by mouth Every Night. 1/26/23  Yes Provider, Historical, MD   GNP Omeprazole 20 MG tablet delayed-release Take 40 mg by mouth 1 (One) Time. 1/5/23 4/27/24 Yes Teodoro Brooks MD   albuterol sulfate  (90 Base) MCG/ACT inhaler Inhale 2 puffs 4 (Four) Times a Day As Needed.  4/27/24  Teodoro Brooks MD   amoxicillin-clavulanate (Augmentin) 875-125 MG per tablet Take 1 tablet by mouth 2 (Two) Times a Day. 2/16/23 4/27/24  Emanuel Mckinney MD   Mestinon 180 MG CR tablet Take 1 tablet by mouth 2 (Two) Times a Day.  4/27/24  Teodoro Brooks MD   zolpidem (AMBIEN) 10 MG tablet Take 1 tablet by mouth At Night As Needed for Sleep.  4/27/24  Teodoro Brooks MD      Objective / Physical Exam   Vital signs:  Temp: 98.5 °F (36.9 °C)  BP: 119/65  Heart Rate: 81  Resp: 18  SpO2: 92 %  Weight: 125 kg (275 lb 9.2 oz)    Admission Weight: Weight: 125 kg (275 lb 9.2 oz)    Physical Exam   Physical Exam  HENT:      Head: Normocephalic and atraumatic.      Nose: Nose normal.   Eyes:      Extraocular Movements: Extraocular movements intact.      Conjunctivae/sclera: Conjunctivae normal.      Pupils: Pupils are equal, round, and reactive to light.   Cardiovascular:      Rate and Rhythm: normal       Pulses: Normal pulses.      Heart sounds: Normal heart sounds.   Pulmonary:      Effort: normal      Breath sounds: normal   Abdominal:      General: Abdomen is flat. Bowel sounds are normal.      Palpations: Abdomen  "is soft.   Musculoskeletal:         Limited movement on the RLE   Tenderness in right hip   Skin:     General: Skin is dry.   Neurological:      General: No focal deficit present.      Mental Status: alert.   Psychiatric:         Mood and Affect: Mood normal.        Labs         Invalid input(s): \"HBG\"          Current Medications   Scheduled Meds:atorvastatin, 20 mg, Oral, Daily  [START ON 2024] buPROPion XL, 300 mg, Oral, QAM  insulin lispro, 2-7 Units, Subcutaneous, 4x Daily AC & at Bedtime  [START ON 2024] pantoprazole, 40 mg, Oral, Q AM  [START ON 2024] sertraline, 100 mg, Oral, QAM  sodium chloride, 10 mL, Intravenous, Q12H  traZODone, 100 mg, Oral, Nightly         Continuous Infusions:      Gillian Leal MD  [facility]   24   13:59 EDT         Electronically signed by Gillian Leal MD at 24 1405       Operative/Procedure Notes (all)    No notes of this type exist for this encounter.          Physician Progress Notes (last 48 hours)        Sage Arias MD at 24 1318              [facility] MEDICINE SERVICE  DAILY PROGRESS NOTE    NAME: Tha Barron  : 1955  MRN: [id number]      LOS: 3 days     PROVIDER OF SERVICE: Sage Arias MD    Chief Complaint: Fracture    Subjective:     Interval History:  History taken from: patient chart RN  No chest pain shortness of breath no abdominal pain  Requiring IV pain medications for pain control  Review of Systems:   Review of Systems  All negative except as above  Objective:     Vital Signs  Temp:  [97.4 °F (36.3 °C)-98.5 °F (36.9 °C)] 98.3 °F (36.8 °C)  Heart Rate:  [82-90] 90  Resp:  [15-17] 17  BP: (100-120)/(68-78) 100/68  Flow (L/min):  [2] 2   Body mass index is 34.44 kg/m².    Physical Exam  Physical Exam  AOx3 NAD  RRR S1-S2 audible  Lungs with fair air entry  Abdomen soft nontender nondistended.  Bowel sounds positive    Scheduled Meds   atorvastatin, 20 mg, Oral, Daily  buPROPion XL, 300 mg, " Oral, QAM  empagliflozin, 10 mg, Oral, Daily  insulin lispro, 2-7 Units, Subcutaneous, 4x Daily AC & at Bedtime  [Held by provider] lisinopril, 5 mg, Oral, Daily  pantoprazole, 40 mg, Oral, Q AM  senna-docusate sodium, 2 tablet, Oral, Nightly   And  polyethylene glycol, 17 g, Oral, BID  sertraline, 100 mg, Oral, QAM  sodium chloride, 10 mL, Intravenous, Q12H  traZODone, 100 mg, Oral, Nightly       PRN Meds     acetaminophen    aluminum-magnesium hydroxide-simethicone    senna-docusate sodium **AND** polyethylene glycol **AND** bisacodyl **AND** bisacodyl    Calcium Replacement - Follow Nurse / BPA Driven Protocol    dextrose    dextrose    dextrose    dextrose    glucagon (human recombinant)    HYDROcodone-acetaminophen    HYDROcodone-acetaminophen    HYDROmorphone    ipratropium-albuterol    Magnesium Standard Dose Replacement - Follow Nurse / BPA Driven Protocol    melatonin    nitroglycerin    ondansetron ODT **OR** ondansetron    Phosphorus Replacement - Follow Nurse / BPA Driven Protocol    Potassium Replacement - Follow Nurse / BPA Driven Protocol    sodium chloride    sodium chloride    zolpidem   Infusions         Diagnostic Data    Results from last 7 days   Lab Units 04/29/24  2319 04/28/24  2332   WBC 10*3/mm3 12.19* 11.00*   HEMOGLOBIN g/dL 14.0 13.7   HEMATOCRIT % 42.8 42.8   PLATELETS 10*3/mm3 280 262   GLUCOSE mg/dL  --  109*   CREATININE mg/dL  --  0.87   BUN mg/dL  --  17   SODIUM mmol/L  --  134*   POTASSIUM mmol/L  --  4.4   ANION GAP mmol/L  --  11.0       CT Abdomen Pelvis Without Contrast    Result Date: 4/29/2024  Areas of small bowel wall thickening and likely intussusceptions noted. Differential considerations include inflammatory bowel disease, underlying mass, possibly small bowel lymphoma. Stable likely benign right renal cysts. Electronically Signed: Tha Smith MD  4/29/2024 7:13 PM EDT  Workstation ID: [id number]       I reviewed the patient's new clinical results.  I reviewed the  patient's new imaging results and agree with the interpretation.    Assessment/Plan:     Active and Resolved Problems  Active Hospital Problems    Diagnosis  POA    **Fracture [T14.8XXA]  Yes      Resolved Hospital Problems   No resolved problems to display.       #Mechanical fall complicated with right acetabular nondisplaced fracture  X-ray hip reviewed orthopedics consulted recommend nonoperative management  Nonweightbearing right lower extremity for 4 to 6 weeks  Outpatient follow-up with orthopedics  Pain control with Oxy as needed.  IV Dilaudid only for breakthrough.  Add Flexeril as needed for muscle spasm  CT abdomen pelvis results reviewed.  Aggressive bowel regimen.  Patient will benefit from outpatient GI evaluation for screening colonoscopy  PT/OT     #COPD, not in exacerbation  -Continue current nebs     # Diabetes mellitus  Blood sugar trend reviewed acceptable  Continue ISS lispro  POC ACHS  Diabetic diet  Hold OHA    #Hypertension  Continue to hold lisinopril    DVT prophylaxis:  Mechanical DVT prophylaxis orders are present.         Code status is   Code Status and Medical Interventions:   Ordered at: 24 1335     Code Status (Patient has no pulse and is not breathing):    CPR (Attempt to Resuscitate)     Medical Interventions (Patient has pulse or is breathing):    Full Support       Plan for disposition: Anticipate discharge next 24 hours    Time: 30 minutes    Signature: Electronically signed by Sage Arias MD, 24, 13:18 EDT.  [facility] Hospitalist Team      Electronically signed by Sage Arias MD at 24 1323       Blanca Mcclendon MD at 24 1006           [facility]     Progress Note    Patient Name: Tha Barron  : 1955  MRN: [id number]  Primary Care Physician:  Provider, No Known  Date of admission: 2024  Service date and time: 24 10:06 EDT  Subjective   Subjective     Chief Complaint: Fracture    HPI:  Patient seen and  examined this morning, patient laying on the bed comfortably.  Patient has no complaint today.    Denies chest pain, shortness of breath, nausea and vomiting    Disposition: Awaiting placement      Objective   Objective     Vitals:   Temp:  [97.6 °F (36.4 °C)-98.8 °F (37.1 °C)] 97.6 °F (36.4 °C)  Resp:  [16-18] 16  BP: (110-128)/(59-79) 128/79  Physical Exam    Constitutional: Awake, alert    Respiratory: Clear to auscultation bilaterally, nonlabored respirations    Cardiovascular: RRR, no murmurs, rubs, or gallops, palpable pedal pulses bilaterally   Gastrointestinal: Positive bowel sounds, soft, nontender, nondistended   Musculoskeletal: No bilateral ankle edema, no clubbing or cyanosis to extremities   Psychiatric: Appropriate affect, cooperative   Neurologic: Oriented x 3, speech clear   Skin: No rashes     Result Review    Result Review:  I have personally reviewed the results from the time of this admission to 4/29/2024 10:06 EDT and agree with these findings:  [x]  Laboratory list / accordion  []  Microbiology  [x]  Radiology  []  EKG/Telemetry   []  Cardiology/Vascular   []  Pathology  []  Old records  []  Other:        Assessment & Plan   Assessment / Plan       Active Hospital Problems:  Active Hospital Problems    Diagnosis     **Fracture      Plan:      #Mechanical fall complicated with right acetabular nondisplaced fracture  -Patient presented with mechanical fall and after that he heard popping sound and started having right hip pain.  -X-ray showing nondisplaced fracture of the medial right acetabulum.  -Orthopedic surgeon consulted and recommended nonsurgical intervention nonbearing weight for 6 weeks  -Pain management.  -Fall precaution.  -PT OT .         #COPD, not in exacerbation  -Respiratory treatment oxygen supplement as needed     # Diabetes mellitus  -Accu-Chek SSI    DVT prophylaxis:  Mechanical DVT prophylaxis orders are present.        CODE STATUS:   Code Status (Patient has no  pulse and is not breathing): CPR (Attempt to Resuscitate)  Medical Interventions (Patient has pulse or is breathing): Full Support    Disposition:  I expect patient to be discharged 24 to 40 hours.    Blanca Mcclendon MD     Electronically signed by Blanca Mcclendon MD at 24 1008       Consult Notes (last 48 hours)  Notes from 24 1337 through 24 1337   No notes of this type exist for this encounter.       Nutrition Notes (most recent note)    No notes exist for this encounter.          Physical Therapy Notes (all)        Aracelis Ross, PT at 24 1552  Version 1 of 1         Patient Name: Tha Barron  : 1955    MRN: [id number]                              Today's Date: 2024       Admit Date: 2024    Visit Dx:     ICD-10-CM ICD-9-CM   1. Closed nondisplaced fracture of medial wall of right acetabulum, initial encounter  S32.474A 808.0     Patient Active Problem List   Diagnosis    Abdominal pain, acute, right upper quadrant    Acute cholecystitis    Fracture     Past Medical History:   Diagnosis Date    COPD (chronic obstructive pulmonary disease)     Diabetes mellitus     Myasthenia gravis     Tardive dyskinesia      Past Surgical History:   Procedure Laterality Date    CHOLECYSTECTOMY N/A 2/15/2023    Procedure: CHOLECYSTECTOMY LAPAROSCOPIC WITH DAVINCI ROBOT;  Surgeon: Tha Franklin MD;  Location: [facility] OR;  Service: Robotics - DaVinci;  Laterality: N/A;      General Information       Row Name 24 1511          Physical Therapy Time and Intention    Document Type evaluation  -OD     Mode of Treatment physical therapy  -OD       Row Name 24 1511          General Information    Patient Profile Reviewed yes  -OD     Prior Level of Function independent:;ADL's;all household mobility;dependent:;cleaning;home management;cooking  -OD     Existing Precautions/Restrictions right;non-weight bearing;other (see comments)  RLE NWB for 4-6 weeks  -OD        Row Name 04/29/24 1511          Living Environment    People in Home other (see comments)  Group home  -OD       Row Name 04/29/24 1511          Home Main Entrance    Number of Stairs, Main Entrance one  -OD       Row Name 04/29/24 1511          Stairs Within Home, Primary    Number of Stairs, Within Home, Primary twelve;other (see comments)  Pt's bedroom is in the basement, and the living room/kitchen is on the main floor.  -OD       Row Name 04/29/24 1511          Cognition    Orientation Status (Cognition) oriented x 4  -OD       Row Name 04/29/24 1511          Safety Issues, Functional Mobility    Impairments Affecting Function (Mobility) pain;strength;endurance/activity tolerance  -OD               User Key  (r) = Recorded By, (t) = Taken By, (c) = Cosigned By      Initials Name Provider Type    OD Aracelis Ross, PT Physical Therapist                   Mobility       Row Name 04/29/24 1057          Bed Mobility    Bed Mobility bed mobility (all) activities  -OD     All Activities, [place] (Bed Mobility) minimum assist (75% patient effort);2 person assist  -OD     Comment, (Bed Mobility) Assist with RLE  -OD       Row Name 04/29/24 1057          Bed-Chair Transfer    Bed-Chair [place] (Transfers) minimum assist (75% patient effort);2 person assist  -OD     Assistive Device (Bed-Chair Transfers) walker, front-wheeled  -OD       Row Name 04/29/24 1057          Sit-Stand Transfer    Sit-Stand [place] (Transfers) minimum assist (75% patient effort);2 person assist  -OD     Assistive Device (Sit-Stand Transfers) walker, front-wheeled  -OD       Row Name 04/29/24 1057          Gait/Stairs (Locomotion)    [place] Level (Gait) minimum assist (75% patient effort);2 person assist  -OD     Assistive Device (Gait) walker, front-wheeled  -OD     Patient was able to Ambulate yes  -OD     Distance in Feet (Gait) 2  -OD     Deviations/Abnormal Patterns (Gait) festinating/shuffling;other (see comments)   hop-to  -OD       Row Name 04/29/24 1057          Mobility    Extremity Weight-bearing Status right lower extremity  -OD     Right Lower Extremity (Weight-bearing Status) non weight-bearing (NWB)  -OD               User Key  (r) = Recorded By, (t) = Taken By, (c) = Cosigned By      Initials Name Provider Type    Aracelis Mendes PT Physical Therapist                   Obj/Interventions       Row Name 04/29/24 1528          Range of Motion Comprehensive    General Range of Motion lower extremity range of motion deficits identified  -OD     Comment, General Range of Motion Limited AROM 2/2 pain  -OD       Row Name 04/29/24 1528          Strength Comprehensive (MMT)    General Manual Muscle Testing (MMT) Assessment lower extremity strength deficits identified  -OD     Comment, General Manual Muscle Testing (MMT) Assessment Pt demo weakness r/t pain  -OD       Row Name 04/29/24 1528          Balance    Balance Interventions sitting;minimal challenge;standing;supported;moderate challenge  -OD       Row Name 04/29/24 1528          Sensory Assessment (Somatosensory)    Sensory Assessment (Somatosensory) sensation intact  -OD               User Key  (r) = Recorded By, (t) = Taken By, (c) = Cosigned By      Initials Name Provider Type    Aracelis Mendes PT Physical Therapist                   Goals/Plan       Row Name 04/29/24 1548          Bed Mobility Goal 1 (PT)    Activity/Assistive Device (Bed Mobility Goal 1, PT) bed mobility activities, all  -OD     [place] Level/Cues Needed (Bed Mobility Goal 1, PT) independent  -OD     Time Frame (Bed Mobility Goal 1, PT) long term goal (LTG);2 weeks  -OD       Row Name 04/29/24 1548          Transfer Goal 1 (PT)    Activity/Assistive Device (Transfer Goal 1, PT) transfers, all  -OD     [place] Level/Cues Needed (Transfer Goal 1, PT) modified independence  -OD     Time Frame (Transfer Goal 1, PT) long term goal (LTG);2 weeks  -OD     Strategies/Barriers (Transfers  Goal 1, PT) NWB RLE  -OD       Row Name 04/29/24 1548          Gait Training Goal 1 (PT)    Activity/Assistive Device (Gait Training Goal 1, PT) gait (walking locomotion);assistive device use;walker, rolling  -OD     [place] Level (Gait Training Goal 1, PT) modified independence  -OD     Distance (Gait Training Goal 1, PT) 20 feet  -OD     Time Frame (Gait Training Goal 1, PT) long term goal (LTG);2 weeks  -OD     Strategies/Barriers (Gait Training Goal 1, PT) NWB RLE  -OD       Row Name 04/29/24 1548          Therapy Assessment/Plan (PT)    Planned Therapy Interventions (PT) balance training;bed mobility training;gait training;home exercise program;neuromuscular re-education;ROM (range of motion);strengthening;stretching;patient/family education;postural re-education;transfer training  -OD               User Key  (r) = Recorded By, (t) = Taken By, (c) = Cosigned By      Initials Name Provider Type    OD Aracelis Ross, PT Physical Therapist                   Clinical Impression       Row Name 04/29/24 1529          Pain    Additional Documentation Pain Scale: FACES Pre/Post-Treatment (Group)  -OD       Row Name 04/29/24 1529          Pain Scale: FACES Pre/Post-Treatment    Pain: FACES Scale, Pretreatment 4-->hurts little more  -OD     Posttreatment Pain Rating 6-->hurts even more  -OD     Pain Location - Side/Orientation Right  -OD     Pain Location generalized  -OD     Pain Location - hip  -OD       Row Name 04/29/24 1529          Plan of Care Review    Plan of Care Reviewed With patient  -OD     Progress no change  -OD     Outcome Evaluation Tha Barron (prefers Carlos) is a 69 y/o old M with PMH of COPD, diabetes mellitus, dyskinesia who presents to [facility] on 4/[facility] after a fall. Imaging shows nondisplaced fracture of the medial right acetabulum. Ortho consulted, recommending conservative management. Pt now NWB to [facility] and will be for 6 weeks. At baseline, pt lives at a group home with [facility] and a flight of  stairs down to his bedroom/bathroom. The living room and kitchen are on main floor. Pt currently is AAOx4 and demo understanding of weight-bearing precautions. Pt limited mostly to pain, but only requires minAx2 for bed mobility, STS, and transfer to bedside recliner. Pt likely to improve well with continued education for NWB status and hop-to pattern for ambulation. Pt would benefit from SNF upon d/c for safe return to PLOF. PT will follow while admitted 5x/wk.  -OD       Row Name 04/29/24 1529          Therapy Assessment/Plan (PT)    Rehab Potential (PT) good, to achieve stated therapy goals  -OD     Criteria for Skilled Interventions Met (PT) yes;meets criteria  -OD     Therapy Frequency (PT) 5 times/wk  -OD     Predicted Duration of Therapy Intervention (PT) until d/c  -OD       Row Name 04/29/24 1529          Vital Signs    O2 Delivery Pre Treatment room air  -OD     O2 Delivery Intra Treatment room air  -OD     O2 Delivery Post Treatment room air  -OD     Pre Patient Position Supine  -OD     Intra Patient Position Standing  -OD     Post Patient Position Sitting  -OD       Row Name 04/29/24 1529          Positioning and Restraints    Pre-Treatment Position in bed  -OD     Post Treatment Position chair  -OD     In Chair notified nsg;reclined;call light within reach;encouraged to call for assist;exit alarm on  -OD               User Key  (r) = Recorded By, (t) = Taken By, (c) = Cosigned By      Initials Name Provider Type    OD Aracelis Ross, PT Physical Therapist                   Outcome Measures       Row Name 04/29/24 1551 04/29/24 0855       How much help from another person do you currently need...    Turning from your back to your side while in flat bed without using bedrails? 3  -OD 2  -EY    Moving from lying on back to sitting on the side of a flat bed without bedrails? 3  -OD 2  -EY    Moving to and from a bed to a chair (including a wheelchair)? 3  -OD 1  -EY    Standing up from a chair using your  arms (e.g., wheelchair, bedside chair)? 2  -OD 1  -EY    Climbing 3-5 steps with a railing? 1  -OD 1  -EY    To walk in hospital room? 2  -OD 1  -EY    AM-PAC 6 Clicks Score (PT) 14  -OD 8  -EY    Highest Level of Mobility Goal 4 --> Transfer to chair/commode  -OD 3 --> Sit at edge of bed  -EY      Row Name 04/29/24 1551          Functional Assessment    Outcome Measure Options AM-PAC 6 Clicks Basic Mobility (PT)  -OD               User Key  (r) = Recorded By, (t) = Taken By, (c) = Cosigned By      Initials Name Provider Type    EY Georgie Solares RN Registered Nurse    OD Aracelis Ross, PT Physical Therapist                                 Physical Therapy Education       Title: PT OT SLP Therapies (Done)       Topic: Physical Therapy (Done)       Point: Mobility training (Done)       Learning Progress Summary             Patient Acceptance, E, VU by OD at 4/29/2024 1552                         Point: Home exercise program (Done)       Learning Progress Summary             Patient Acceptance, E, VU by OD at 4/29/2024 1552                         Point: Body mechanics (Done)       Learning Progress Summary             Patient Acceptance, E, VU by OD at 4/29/2024 1552                         Point: Precautions (Done)       Learning Progress Summary             Patient Acceptance, E, VU by OD at 4/29/2024 1552                                         User Key       Initials Effective Dates Name Provider Type Discipline    OD 05/11/23 -  Aracelis Ross, PT Physical Therapist PT                  PT Recommendation and Plan  Planned Therapy Interventions (PT): balance training, bed mobility training, gait training, home exercise program, neuromuscular re-education, ROM (range of motion), strengthening, stretching, patient/family education, postural re-education, transfer training  Plan of Care Reviewed With: patient  Progress: no change  Outcome Evaluation: Tha Barron (prefers Carlos) is a 67 y/o old M with PMH of  COPD, diabetes mellitus, dyskinesia who presents to [facility] on 4/27 after a fall. Imaging shows nondisplaced fracture of the medial right acetabulum. Ortho consulted, recommending conservative management. Pt now NWB to RLE and will be for 6 weeks. At baseline, pt lives at a group home with 1STE and a flight of stairs down to his bedroom/bathroom. The living room and kitchen are on main floor. Pt currently is AAOx4 and demo understanding of weight-bearing precautions. Pt limited mostly to pain, but only requires minAx2 for bed mobility, STS, and transfer to bedside recliner. Pt likely to improve well with continued education for NWB status and hop-to pattern for ambulation. Pt would benefit from SNF upon d/c for safe return to PLOF. PT will follow while admitted 5x/wk.     Time Calculation:         PT Charges       Row Name 04/29/24 1552             Time Calculation    Start Time 1057  -OD      Stop Time 1117  -OD      Time Calculation (min) 20 min  -OD      PT Received On 04/29/24  -OD      PT - Next Appointment 04/30/24  -OD      PT Goal Re-Cert Due Date 05/13/24  -OD         Time Calculation- PT    Total Timed Code Minutes- PT 0 minute(s)  -OD                User Key  (r) = Recorded By, (t) = Taken By, (c) = Cosigned By      Initials Name Provider Type    OD Aracelis Ross, PT Physical Therapist                  Therapy Charges for Today       Code Description Service Date Service Provider Modifiers Qty    [id number] HC PT EVAL MOD COMPLEXITY 4 4/29/2024 Aracelis Ross, PT GP 1            PT G-Codes  Outcome Measure Options: AM-PAC 6 Clicks Basic Mobility (PT)  AM-PAC 6 Clicks Score (PT): 14  PT Discharge Summary  Anticipated Discharge Disposition (PT): skilled nursing facility    Aracelis Ross PT  4/29/2024      Electronically signed by Aracelis Ross PT at 04/29/24 5093       Aracelis Ross PT at 04/29/24 1553  Version 1 of 1         Goal Outcome Evaluation:  Plan of Care Reviewed With: patient        Progress:  no change  Outcome Evaluation: Tha Barron (prefers Carlos) is a 67 y/o old M with PMH of COPD, diabetes mellitus, dyskinesia who presents to [facility] on  after a fall. Imaging shows nondisplaced fracture of the medial right acetabulum. Ortho consulted, recommending conservative management. Pt now NWB to RLE and will be for 6 weeks. At baseline, pt lives at a group home with 1STE and a flight of stairs down to his bedroom/bathroom. The living room and kitchen are on main floor. Pt currently is AAOx4 and demo understanding of weight-bearing precautions. Pt limited mostly to pain, but only requires minAx2 for bed mobility, STS, and transfer to bedside recliner. Pt likely to improve well with continued education for NWB status and hop-to pattern for ambulation. Pt would benefit from SNF upon d/c for safe return to PLOF. PT will follow while admitted 5x/wk.      Anticipated Discharge Disposition (PT): skilled nursing facility                          Electronically signed by Aracelis Ross, PT at 24 1553          Occupational Therapy Notes (all)        Ottoniel Calderon, OT at 24 1622          Patient Name: Tha Barron  : 1955    MRN: [id number]                              Today's Date: 2024       Admit Date: 2024    Visit Dx:     ICD-10-CM ICD-9-CM   1. Closed nondisplaced fracture of medial wall of right acetabulum, initial encounter  S32.474A 808.0     Patient Active Problem List   Diagnosis    Abdominal pain, acute, right upper quadrant    Acute cholecystitis    Fracture     Past Medical History:   Diagnosis Date    COPD (chronic obstructive pulmonary disease)     Diabetes mellitus     Myasthenia gravis     Tardive dyskinesia      Past Surgical History:   Procedure Laterality Date    CHOLECYSTECTOMY N/A 2/15/2023    Procedure: CHOLECYSTECTOMY LAPAROSCOPIC WITH DAVINCI ROBOT;  Surgeon: Tha Franklin MD;  Location: [facility] MAIN OR;  Service: Robotics - DaVinci;   Laterality: N/A;      General Information       Row Name 04/29/24 1607          OT Time and Intention    Document Type evaluation  -LS     Mode of Treatment occupational therapy  -       Row Name 04/29/24 1607          General Information    Patient Profile Reviewed yes  -LS     Prior Level of Function independent:;ADL's;all household mobility  -LS     Existing Precautions/Restrictions right;non-weight bearing;other (see comments)  RLE NWB  -LS       Row Name 04/29/24 1607          Living Environment    People in Home other (see comments)  Pt lives in a group home  -       Row Name 04/29/24 1607          Home Main Entrance    Number of Stairs, Main Entrance one  -LS       Row Name 04/29/24 1607          Stairs Within Home, Primary    Number of Stairs, Within Home, Primary twelve;other (see comments)  Upon entry to home, pt room downstairs and reports upstairs is Living Room and common area  -LS       Row Name 04/29/24 1607          Cognition    Orientation Status (Cognition) oriented x 4  -LS       Row Name 04/29/24 1607          Safety Issues, Functional Mobility    Impairments Affecting Function (Mobility) pain;strength;endurance/activity tolerance;balance;other (see comments)  NWB  -LS               User Key  (r) = Recorded By, (t) = Taken By, (c) = Cosigned By      Initials Name Provider Type    LS Ottoniel Calderon OT Occupational Therapist                     Mobility/ADL's       Row Name 04/29/24 1609          Bed Mobility    Bed Mobility bed mobility (all) activities  -LS     All Activities, [place] (Bed Mobility) minimum assist (75% patient effort);2 person assist  -LS       Row Name 04/29/24 1609          Transfers    Transfers sit-stand transfer;bed-chair transfer  -       Row Name 04/29/24 1609          Bed-Chair Transfer    Bed-Chair [place] (Transfers) minimum assist (75% patient effort);2 person assist  -LS     Assistive Device (Bed-Chair Transfers) walker, front-wheeled  -       Row  Name 04/29/24 1609          Sit-Stand Transfer    Sit-Stand [place] (Transfers) minimum assist (75% patient effort);2 person assist  -LS     Assistive Device (Sit-Stand Transfers) walker, front-wheeled  -LS       Row Name 04/29/24 1609          Functional Mobility    Patient was able to Ambulate yes  -       Row Name 04/29/24 1609          Activities of Daily Living    BADL Assessment/Intervention lower body dressing  -LS       Row Name 04/29/24 1609          Mobility    Extremity Weight-bearing Status right lower extremity  -LS     Right Lower Extremity (Weight-bearing Status) non weight-bearing (NWB)  -       Row Name 04/29/24 1609          Lower Body Dressing Assessment/Training    [place] Level (Lower Body Dressing) lower body dressing skills;maximum assist (25% patient effort)  -               User Key  (r) = Recorded By, (t) = Taken By, (c) = Cosigned By      Initials Name Provider Type    LS Ottoniel Calderon OT Occupational Therapist                   Obj/Interventions       Row Name 04/29/24 1615          Sensory Assessment (Somatosensory)    Sensory Assessment (Somatosensory) sensation intact  -       Row Name 04/29/24 1615          Range of Motion Comprehensive    General Range of Motion bilateral upper extremity ROM WFL  -       Row Name 04/29/24 1615          Strength Comprehensive (MMT)    Comment, General Manual Muscle Testing (MMT) Assessment BUEs grossly 4/5  -       Row Name 04/29/24 1615          Balance    Balance Assessment sitting static balance;sitting dynamic balance;standing static balance;standing dynamic balance  -LS     Static Sitting Balance modified independence  -LS     Dynamic Sitting Balance modified independence  -LS     Position, Sitting Balance sitting edge of bed  -LS     Static Standing Balance minimal assist  -LS     Dynamic Standing Balance minimal assist  -LS     Position/Device Used, Standing Balance walker, front-wheeled  -LS               User Key  (r) =  Recorded By, (t) = Taken By, (c) = Cosigned By      Initials Name Provider Type    Ottoniel See OT Occupational Therapist                   Goals/Plan       Row Name 04/29/24 1621          Bed Mobility Goal 1 (OT)    Activity/Assistive Device (Bed Mobility Goal 1, OT) bed mobility activities, all  -LS     [place] Level/Cues Needed (Bed Mobility Goal 1, OT) modified independence  -LS     Time Frame (Bed Mobility Goal 1, OT) long term goal (LTG);2 weeks  -LS       Row Name 04/29/24 1621          Transfer Goal 1 (OT)    Activity/Assistive Device (Transfer Goal 1, OT) transfers, all  -LS     [place] Level/Cues Needed (Transfer Goal 1, OT) modified independence  -LS     Time Frame (Transfer Goal 1, OT) long term goal (LTG);2 weeks  -LS       Row Name 04/29/24 1621          Dressing Goal 1 (OT)    Activity/Device (Dressing Goal 1, OT) dressing skills, all  -LS     [place]/Cues Needed (Dressing Goal 1, OT) modified independence  -LS     Time Frame (Dressing Goal 1, OT) long term goal (LTG);2 weeks  -LS       Row Name 04/29/24 1621          Toileting Goal 1 (OT)    Activity/Device (Toileting Goal 1, OT) toileting skills, all  -LS     [place] Level/Cues Needed (Toileting Goal 1, OT) modified independence  -LS     Time Frame (Toileting Goal 1, OT) long term goal (LTG);2 weeks  -LS       Row Name 04/29/24 1621          Therapy Assessment/Plan (OT)    Planned Therapy Interventions (OT) activity tolerance training;BADL retraining;functional balance retraining;IADL retraining;occupation/activity based interventions;ROM/therapeutic exercise;strengthening exercise;transfer/mobility retraining;patient/caregiver education/training  -               User Key  (r) = Recorded By, (t) = Taken By, (c) = Cosigned By      Initials Name Provider Type    Ottoniel See OT Occupational Therapist                   Clinical Impression       Row Name 04/29/24 1617          Pain Assessment    Pretreatment Pain Rating  6/10  -LS     Posttreatment Pain Rating 8/10  -LS     Pain Location - Side/Orientation Right  -LS     Pain Location - hip  -LS       Row Name 04/29/24 1617          Plan of Care Review    Plan of Care Reviewed With patient  -LS     Outcome Evaluation 68 y.o. old male patient with PMH of COPD, diabetes mellitus, dyskinesia, presents to the hospital 4/27 after a mechanical fall with right hip pain. Imaging shows there is nondisplaced fracture of the medial right acetabulum. Ortho consulted, recommending conservative management. Pt now NWB to RLE and will be for 6 weeks. At baseline, pt lives at a group home with 1STE and a flight of stairs down to his bedroom/bathroom. The living room and kitchen are on main floor. Pt currently is AAOx4 and demo understanding of weight-bearing precautions. Pt with increased pain with movement. Min Ax2 for bed mobility, sit to stand, and stand pivot to chair. He did do well with NWB on RLE, but was unable to ambulate beyond the transfer today secondary to pain. OT feels pt most appropriate for SNF until he is cleared for WBing on RLE. Will follow.  -       Row Name 04/29/24 1617          Therapy Assessment/Plan (OT)    Rehab Potential (OT) good, to achieve stated therapy goals  -     Criteria for Skilled Therapeutic Interventions Met (OT) yes;skilled treatment is necessary  -     Therapy Frequency (OT) 5 times/wk  -     Predicted Duration of Therapy Intervention (OT) until dc  -LS       Row Name 04/29/24 1617          Therapy Plan Review/Discharge Plan (OT)    Anticipated Discharge Disposition (OT) skilled nursing facility  -       Row Name 04/29/24 1617          Vital Signs    O2 Delivery Pre Treatment room air  -LS     O2 Delivery Intra Treatment room air  -LS     O2 Delivery Post Treatment room air  -LS     Pre Patient Position Supine  -LS     Intra Patient Position Standing  -LS     Post Patient Position Sitting  -LS       Row Name 04/29/24 1617          Positioning and  Restraints    Pre-Treatment Position in bed  -LS     Post Treatment Position chair  -LS     In Chair notified nsg;reclined;call light within reach;encouraged to call for assist;exit alarm on  -LS               User Key  (r) = Recorded By, (t) = Taken By, (c) = Cosigned By      Initials Name Provider Type    Ottoniel See OT Occupational Therapist                   Outcome Measures       Row Name 04/29/24 1621          How much help from another is currently needed...    Putting on and taking off regular lower body clothing? 2  -LS     Bathing (including washing, rinsing, and drying) 2  -LS     Toileting (which includes using toilet bed pan or urinal) 2  -LS     Putting on and taking off regular upper body clothing 3  -LS     Taking care of personal grooming (such as brushing teeth) 4  -LS     Eating meals 4  -LS     AM-PAC 6 Clicks Score (OT) 17  -LS       Row Name 04/29/24 1551 04/29/24 0855       How much help from another person do you currently need...    Turning from your back to your side while in flat bed without using bedrails? 3  -OD 2  -EY    Moving from lying on back to sitting on the side of a flat bed without bedrails? 3  -OD 2  -EY    Moving to and from a bed to a chair (including a wheelchair)? 3  -OD 1  -EY    Standing up from a chair using your arms (e.g., wheelchair, bedside chair)? 2  -OD 1  -EY    Climbing 3-5 steps with a railing? 1  -OD 1  -EY    To walk in hospital room? 2  -OD 1  -EY    AM-PAC 6 Clicks Score (PT) 14  -OD 8  -EY    Highest Level of Mobility Goal 4 --> Transfer to chair/commode  -OD 3 --> Sit at edge of bed  -EY      Row Name 04/29/24 1621 04/29/24 1551       Functional Assessment    Outcome Measure Options AM-PAC 6 Clicks Daily Activity (OT)  -LS AM-PAC 6 Clicks Basic Mobility (PT)  -OD              User Key  (r) = Recorded By, (t) = Taken By, (c) = Cosigned By      Initials Name Provider Type    EY Georgie Solares RN Registered Nurse    Ottoniel See OT Occupational  Therapist    Aracelis Mendes, PT Physical Therapist                    Occupational Therapy Education       Title: PT OT SLP Therapies (Done)       Topic: Occupational Therapy (Done)       Point: ADL training (Done)       Description:   Instruct learner(s) on proper safety adaptation and remediation techniques during self care or transfers.   Instruct in proper use of assistive devices.                  Learning Progress Summary             Patient Acceptance, E,TB, VU by  at 4/29/2024 1621                         Point: Precautions (Done)       Description:   Instruct learner(s) on prescribed precautions during self-care and functional transfers.                  Learning Progress Summary             Patient Acceptance, E,TB, VU by  at 4/29/2024 1621                         Point: Body mechanics (Done)       Description:   Instruct learner(s) on proper positioning and spine alignment during self-care, functional mobility activities and/or exercises.                  Learning Progress Summary             Patient Acceptance, E,TB, VU by  at 4/29/2024 1621                                         User Key       Initials Effective Dates Name Provider Type Discipline     09/22/22 -  Ottoniel Calderon OT Occupational Therapist OT                  OT Recommendation and Plan  Planned Therapy Interventions (OT): activity tolerance training, BADL retraining, functional balance retraining, IADL retraining, occupation/activity based interventions, ROM/therapeutic exercise, strengthening exercise, transfer/mobility retraining, patient/caregiver education/training  Therapy Frequency (OT): 5 times/wk  Plan of Care Review  Plan of Care Reviewed With: patient  Outcome Evaluation: 68 y.o. old male patient with PMH of COPD, diabetes mellitus, dyskinesia, presents to the hospital 4/27 after a mechanical fall with right hip pain. Imaging shows there is nondisplaced fracture of the medial right acetabulum. Ortho consulted,  recommending conservative management. Pt now NWB to RLE and will be for 6 weeks. At baseline, pt lives at a group home with 1STE and a flight of stairs down to his bedroom/bathroom. The living room and kitchen are on main floor. Pt currently is AAOx4 and demo understanding of weight-bearing precautions. Pt with increased pain with movement. Min Ax2 for bed mobility, sit to stand, and stand pivot to chair. He did do well with NWB on RLE, but was unable to ambulate beyond the transfer today secondary to pain. OT feels pt most appropriate for SNF until he is cleared for WBing on RLE. Will follow.     Time Calculation:         Time Calculation- OT       Row Name 04/29/24 1621             Time Calculation- OT    OT Start Time 1057  -LS      OT Stop Time 1118  -LS      OT Time Calculation (min) 21 min  -LS      OT Received On 04/29/24  -LS      OT - Next Appointment 04/30/24  -      OT Goal Re-Cert Due Date 05/13/24  -LS         Untimed Charges    OT Eval/Re-eval Minutes 21  -LS         Total Minutes    Untimed Charges Total Minutes 21  -LS       Total Minutes 21  -LS                User Key  (r) = Recorded By, (t) = Taken By, (c) = Cosigned By      Initials Name Provider Type     Ottoniel Calderon OT Occupational Therapist                  Therapy Charges for Today       Code Description Service Date Service Provider Modifiers Qty    [id number]  OT EVAL MOD COMPLEXITY 4 4/29/2024 Ottoniel Calderon OT GO 1                 Ottoniel Calderon OT  4/29/2024    Electronically signed by Ottoniel Calderon OT at 04/29/24 1622       Ottoniel Calderon OT at 04/29/24 1621          Goal Outcome Evaluation:  Plan of Care Reviewed With: patient           Outcome Evaluation: 68 y.o. old male patient with PMH of COPD, diabetes mellitus, dyskinesia, presents to the hospital 4/27 after a mechanical fall with right hip pain. Imaging shows there is nondisplaced fracture of the medial right acetabulum. Ortho consulted, recommending conservative  management. Pt now NWB to RLE and will be for 6 weeks. At baseline, pt lives at a group home with 1STE and a flight of stairs down to his bedroom/bathroom. The living room and kitchen are on main floor. Pt currently is AAOx4 and demo understanding of weight-bearing precautions. Pt with increased pain with movement. Min Ax2 for bed mobility, sit to stand, and stand pivot to chair. He did do well with NWB on RLE, but was unable to ambulate beyond the transfer today secondary to pain. OT feels pt most appropriate for SNF until he is cleared for WBing on RLE. Will follow.      Anticipated Discharge Disposition (OT): skilled nursing facility                          Electronically signed by Ottoniel Calderon OT at 04/29/24 1345       Speech Language Pathology Notes (all)    No notes exist for this encounter.

## 2024-04-30 NOTE — PROGRESS NOTES
[facility] MEDICINE SERVICE  DAILY PROGRESS NOTE    NAME: Tha Barron  : 1955  MRN: [id number]      LOS: 3 days     PROVIDER OF SERVICE: Sage Arias MD    Chief Complaint: Fracture    Subjective:     Interval History:  History taken from: patient chart RN  No chest pain shortness of breath no abdominal pain  Requiring IV pain medications for pain control  Review of Systems:   Review of Systems  All negative except as above  Objective:     Vital Signs  Temp:  [97.4 °F (36.3 °C)-98.5 °F (36.9 °C)] 98.3 °F (36.8 °C)  Heart Rate:  [82-90] 90  Resp:  [15-17] 17  BP: (100-120)/(68-78) 100/68  Flow (L/min):  [2] 2   Body mass index is 34.44 kg/m².    Physical Exam  Physical Exam  AOx3 NAD  RRR S1-S2 audible  Lungs with fair air entry  Abdomen soft nontender nondistended.  Bowel sounds positive    Scheduled Meds   atorvastatin, 20 mg, Oral, Daily  buPROPion XL, 300 mg, Oral, QAM  empagliflozin, 10 mg, Oral, Daily  insulin lispro, 2-7 Units, Subcutaneous, 4x Daily AC & at Bedtime  [Held by provider] lisinopril, 5 mg, Oral, Daily  pantoprazole, 40 mg, Oral, Q AM  senna-docusate sodium, 2 tablet, Oral, Nightly   And  polyethylene glycol, 17 g, Oral, BID  sertraline, 100 mg, Oral, QAM  sodium chloride, 10 mL, Intravenous, Q12H  traZODone, 100 mg, Oral, Nightly       PRN Meds     acetaminophen    aluminum-magnesium hydroxide-simethicone    senna-docusate sodium **AND** polyethylene glycol **AND** bisacodyl **AND** bisacodyl    Calcium Replacement - Follow Nurse / BPA Driven Protocol    dextrose    dextrose    dextrose    dextrose    glucagon (human recombinant)    HYDROcodone-acetaminophen    HYDROcodone-acetaminophen    HYDROmorphone    ipratropium-albuterol    Magnesium Standard Dose Replacement - Follow Nurse / BPA Driven Protocol    melatonin    nitroglycerin    ondansetron ODT **OR** ondansetron    Phosphorus Replacement - Follow Nurse / BPA Driven Protocol    Potassium Replacement -  Follow Nurse / BPA Driven Protocol    sodium chloride    sodium chloride    zolpidem   Infusions         Diagnostic Data    Results from last 7 days   Lab Units 04/29/24  2319 04/28/24  2332   WBC 10*3/mm3 12.19* 11.00*   HEMOGLOBIN g/dL 14.0 13.7   HEMATOCRIT % 42.8 42.8   PLATELETS 10*3/mm3 280 262   GLUCOSE mg/dL  --  109*   CREATININE mg/dL  --  0.87   BUN mg/dL  --  17   SODIUM mmol/L  --  134*   POTASSIUM mmol/L  --  4.4   ANION GAP mmol/L  --  11.0       CT Abdomen Pelvis Without Contrast    Result Date: 4/29/2024  Areas of small bowel wall thickening and likely intussusceptions noted. Differential considerations include inflammatory bowel disease, underlying mass, possibly small bowel lymphoma. Stable likely benign right renal cysts. Electronically Signed: Tha Smith MD  4/29/2024 7:13 PM EDT  Workstation ID: [id number]       I reviewed the patient's new clinical results.  I reviewed the patient's new imaging results and agree with the interpretation.    Assessment/Plan:     Active and Resolved Problems  Active Hospital Problems    Diagnosis  POA    **Fracture [T14.8XXA]  Yes      Resolved Hospital Problems   No resolved problems to display.       #Mechanical fall complicated with right acetabular nondisplaced fracture  X-ray hip reviewed orthopedics consulted recommend nonoperative management  Nonweightbearing right lower extremity for 4 to 6 weeks  Outpatient follow-up with orthopedics  Pain control with Oxy as needed.  IV Dilaudid only for breakthrough.  Add Flexeril as needed for muscle spasm  CT abdomen pelvis results reviewed.  Aggressive bowel regimen.  Patient will benefit from outpatient GI evaluation for screening colonoscopy  PT/OT     #COPD, not in exacerbation  -Continue current nebs     # Diabetes mellitus  Blood sugar trend reviewed acceptable  Continue ISS lispro  POC ACHS  Diabetic diet  Hold OHA    #Hypertension  Continue to hold lisinopril    DVT prophylaxis:  Mechanical DVT prophylaxis  orders are present.         Code status is   Code Status and Medical Interventions:   Ordered at: 04/27/24 1335     Code Status (Patient has no pulse and is not breathing):    CPR (Attempt to Resuscitate)     Medical Interventions (Patient has pulse or is breathing):    Full Support       Plan for disposition: Anticipate discharge next 24 hours    Time: 30 minutes    Signature: Electronically signed by Sage Arias MD, 04/30/24, 13:18 EDT.  [facility] Hospitalist Team

## 2024-04-30 NOTE — PLAN OF CARE
"Assessment: Tha Barron presents with functional mobility impairments which indicate the need for skilled intervention. Tolerating session today without incident. Pt reports pain is not well controlled. Min A required for bed mobility. Pt unable to come to full stand from EOB while complying with NWB status. SqPS transfer from EOB to recliner chair on L side mod A. Pt tolerated AAROM therex well on RLE without significant increase in pain. Will continue to follow and progress as tolerated.     Plan/Recommendations:   If medically appropriate, Moderate Intensity Therapy recommended post-acute care. This is recommended as therapy feels the patient would require 3-4 days per week and wouldn't tolerate \"3 hour daily\" rehab intensity. SNF would be the preferred choice. If the patient does not agree to SNF, arrange HH or OP depending on home bound status. If patient is medically complex, consider LTACH. Pt requires no DME at discharge.     Pt desires Skilled Rehab placement at discharge. Pt cooperative; agreeable to therapeutic recommendations and plan of care.     "

## 2024-04-30 NOTE — PLAN OF CARE
Goal Outcome Evaluation:      Patient is doing well. Has been painful tonight, treated per MAR. Pillows used to turn him off his backside. Has otherwise rested in bed. Care continuing.

## 2024-04-30 NOTE — SIGNIFICANT NOTE
04/30/24 1317   PASRR   PASRR Status Approved/Complete  ([facility])

## 2024-05-01 LAB
ANION GAP SERPL CALCULATED.3IONS-SCNC: 9 MMOL/L (ref 5–15)
BASOPHILS # BLD AUTO: 0.04 10*3/MM3 (ref 0–0.2)
BASOPHILS NFR BLD AUTO: 0.4 % (ref 0–1.5)
BUN SERPL-MCNC: 20 MG/DL (ref 8–23)
BUN/CREAT SERPL: 21.7 (ref 7–25)
CALCIUM SPEC-SCNC: 9.4 MG/DL (ref 8.6–10.5)
CHLORIDE SERPL-SCNC: 99 MMOL/L (ref 98–107)
CO2 SERPL-SCNC: 26 MMOL/L (ref 22–29)
CREAT SERPL-MCNC: 0.92 MG/DL (ref 0.76–1.27)
DEPRECATED RDW RBC AUTO: 48.9 FL (ref 37–54)
EGFRCR SERPLBLD CKD-EPI 2021: 90 ML/MIN/1.73
EOSINOPHIL # BLD AUTO: 0.18 10*3/MM3 (ref 0–0.4)
EOSINOPHIL NFR BLD AUTO: 1.8 % (ref 0.3–6.2)
ERYTHROCYTE [DISTWIDTH] IN BLOOD BY AUTOMATED COUNT: 14.2 % (ref 12.3–15.4)
GLUCOSE BLDC GLUCOMTR-MCNC: 119 MG/DL (ref 70–105)
GLUCOSE BLDC GLUCOMTR-MCNC: 130 MG/DL (ref 70–105)
GLUCOSE BLDC GLUCOMTR-MCNC: 131 MG/DL (ref 70–105)
GLUCOSE BLDC GLUCOMTR-MCNC: 175 MG/DL (ref 70–105)
GLUCOSE SERPL-MCNC: 119 MG/DL (ref 65–99)
HCT VFR BLD AUTO: 42.9 % (ref 37.5–51)
HGB BLD-MCNC: 14 G/DL (ref 13–17.7)
IMM GRANULOCYTES # BLD AUTO: 0.05 10*3/MM3 (ref 0–0.05)
IMM GRANULOCYTES NFR BLD AUTO: 0.5 % (ref 0–0.5)
LYMPHOCYTES # BLD AUTO: 2.02 10*3/MM3 (ref 0.7–3.1)
LYMPHOCYTES NFR BLD AUTO: 20.6 % (ref 19.6–45.3)
MAGNESIUM SERPL-MCNC: 1.9 MG/DL (ref 1.6–2.4)
MCH RBC QN AUTO: 30.5 PG (ref 26.6–33)
MCHC RBC AUTO-ENTMCNC: 32.6 G/DL (ref 31.5–35.7)
MCV RBC AUTO: 93.5 FL (ref 79–97)
MONOCYTES # BLD AUTO: 0.8 10*3/MM3 (ref 0.1–0.9)
MONOCYTES NFR BLD AUTO: 8.2 % (ref 5–12)
NEUTROPHILS NFR BLD AUTO: 6.72 10*3/MM3 (ref 1.7–7)
NEUTROPHILS NFR BLD AUTO: 68.5 % (ref 42.7–76)
NRBC BLD AUTO-RTO: 0 /100 WBC (ref 0–0.2)
PHOSPHATE SERPL-MCNC: 3.8 MG/DL (ref 2.5–4.5)
PLATELET # BLD AUTO: 267 10*3/MM3 (ref 140–450)
PMV BLD AUTO: 10.2 FL (ref 6–12)
POTASSIUM SERPL-SCNC: 4.9 MMOL/L (ref 3.5–5.2)
RBC # BLD AUTO: 4.59 10*6/MM3 (ref 4.14–5.8)
SODIUM SERPL-SCNC: 134 MMOL/L (ref 136–145)
WBC NRBC COR # BLD AUTO: 9.81 10*3/MM3 (ref 3.4–10.8)

## 2024-05-01 PROCEDURE — 83735 ASSAY OF MAGNESIUM: CPT | Performed by: HOSPITALIST

## 2024-05-01 PROCEDURE — 97535 SELF CARE MNGMENT TRAINING: CPT

## 2024-05-01 PROCEDURE — 85025 COMPLETE CBC W/AUTO DIFF WBC: CPT | Performed by: STUDENT IN AN ORGANIZED HEALTH CARE EDUCATION/TRAINING PROGRAM

## 2024-05-01 PROCEDURE — 25010000002 HYDROMORPHONE 1 MG/ML SOLUTION: Performed by: HOSPITALIST

## 2024-05-01 PROCEDURE — 97530 THERAPEUTIC ACTIVITIES: CPT

## 2024-05-01 PROCEDURE — 82948 REAGENT STRIP/BLOOD GLUCOSE: CPT | Performed by: INTERNAL MEDICINE

## 2024-05-01 PROCEDURE — 80048 BASIC METABOLIC PNL TOTAL CA: CPT | Performed by: HOSPITALIST

## 2024-05-01 PROCEDURE — 97112 NEUROMUSCULAR REEDUCATION: CPT

## 2024-05-01 PROCEDURE — 84100 ASSAY OF PHOSPHORUS: CPT | Performed by: HOSPITALIST

## 2024-05-01 PROCEDURE — 82948 REAGENT STRIP/BLOOD GLUCOSE: CPT

## 2024-05-01 PROCEDURE — 63710000001 INSULIN LISPRO (HUMAN) PER 5 UNITS: Performed by: INTERNAL MEDICINE

## 2024-05-01 RX ADMIN — HYDROMORPHONE HYDROCHLORIDE 1 MG: 1 INJECTION, SOLUTION INTRAMUSCULAR; INTRAVENOUS; SUBCUTANEOUS at 06:12

## 2024-05-01 RX ADMIN — HYDROCODONE BITARTRATE AND ACETAMINOPHEN 2 TABLET: 5; 325 TABLET ORAL at 18:52

## 2024-05-01 RX ADMIN — TRAZODONE HYDROCHLORIDE 100 MG: 100 TABLET ORAL at 20:21

## 2024-05-01 RX ADMIN — Medication 10 ML: at 20:21

## 2024-05-01 RX ADMIN — ZOLPIDEM TARTRATE 10 MG: 5 TABLET ORAL at 20:34

## 2024-05-01 RX ADMIN — CYCLOBENZAPRINE 10 MG: 10 TABLET, FILM COATED ORAL at 06:04

## 2024-05-01 RX ADMIN — Medication 10 ML: at 08:20

## 2024-05-01 RX ADMIN — PANTOPRAZOLE SODIUM 40 MG: 40 TABLET, DELAYED RELEASE ORAL at 06:04

## 2024-05-01 RX ADMIN — EMPAGLIFLOZIN 10 MG: 10 TABLET, FILM COATED ORAL at 08:20

## 2024-05-01 RX ADMIN — INSULIN LISPRO 2 UNITS: 100 INJECTION, SOLUTION INTRAVENOUS; SUBCUTANEOUS at 16:31

## 2024-05-01 RX ADMIN — CYCLOBENZAPRINE 10 MG: 10 TABLET, FILM COATED ORAL at 18:52

## 2024-05-01 RX ADMIN — HYDROCODONE BITARTRATE AND ACETAMINOPHEN 2 TABLET: 5; 325 TABLET ORAL at 13:38

## 2024-05-01 RX ADMIN — BUPROPION HYDROCHLORIDE 300 MG: 150 TABLET, EXTENDED RELEASE ORAL at 06:04

## 2024-05-01 RX ADMIN — POLYETHYLENE GLYCOL 3350 17 G: 17 POWDER, FOR SOLUTION ORAL at 20:21

## 2024-05-01 RX ADMIN — POLYETHYLENE GLYCOL 3350 17 G: 17 POWDER, FOR SOLUTION ORAL at 08:20

## 2024-05-01 RX ADMIN — DOCUSATE SODIUM AND SENNOSIDES 2 TABLET: 8.6; 5 TABLET, FILM COATED ORAL at 20:21

## 2024-05-01 RX ADMIN — ATORVASTATIN CALCIUM 20 MG: 20 TABLET, FILM COATED ORAL at 08:20

## 2024-05-01 RX ADMIN — SERTRALINE 100 MG: 100 TABLET, FILM COATED ORAL at 06:16

## 2024-05-01 RX ADMIN — HYDROCODONE BITARTRATE AND ACETAMINOPHEN 2 TABLET: 5; 325 TABLET ORAL at 08:26

## 2024-05-01 NOTE — PROGRESS NOTES
[facility] MEDICINE SERVICE  DAILY PROGRESS NOTE    NAME: Tha Barron  : 1955  MRN: [id number]      LOS: 4 days     PROVIDER OF SERVICE: Sage Arias MD    Chief Complaint: Fracture    Subjective:     Interval History:  History taken from: patient chart RN  Pain better controlled    Review of Systems:   Review of Systems  All negative except as above  Objective:     Vital Signs  Temp:  [97.6 °F (36.4 °C)-98.3 °F (36.8 °C)] 98.3 °F (36.8 °C)  Heart Rate:  [75-86] 86  Resp:  [13-20] 15  BP: ()/(63-78) 108/69  Flow (L/min):  [2] 2   Body mass index is 34.44 kg/m².    Physical Exam  Physical Exam  AOx3 NAD  RRR S1-S2 audible  Lungs with fair air entry  Abdomen soft nontender nondistended.  Bowel sounds positive  Scheduled Meds   atorvastatin, 20 mg, Oral, Daily  buPROPion XL, 300 mg, Oral, QAM  empagliflozin, 10 mg, Oral, Daily  insulin lispro, 2-7 Units, Subcutaneous, 4x Daily AC & at Bedtime  [Held by provider] lisinopril, 5 mg, Oral, Daily  pantoprazole, 40 mg, Oral, Q AM  senna-docusate sodium, 2 tablet, Oral, Nightly   And  polyethylene glycol, 17 g, Oral, BID  sertraline, 100 mg, Oral, QAM  sodium chloride, 10 mL, Intravenous, Q12H  traZODone, 100 mg, Oral, Nightly       PRN Meds     acetaminophen    aluminum-magnesium hydroxide-simethicone    senna-docusate sodium **AND** polyethylene glycol **AND** bisacodyl **AND** bisacodyl    Calcium Replacement - Follow Nurse / BPA Driven Protocol    cyclobenzaprine    dextrose    dextrose    dextrose    dextrose    glucagon (human recombinant)    HYDROcodone-acetaminophen    HYDROcodone-acetaminophen    ipratropium-albuterol    Magnesium Standard Dose Replacement - Follow Nurse / BPA Driven Protocol    melatonin    nitroglycerin    ondansetron ODT **OR** ondansetron    Phosphorus Replacement - Follow Nurse / BPA Driven Protocol    Potassium Replacement - Follow Nurse / BPA Driven Protocol    sodium chloride    sodium chloride     zolpidem   Infusions         Diagnostic Data    Results from last 7 days   Lab Units 05/01/24  0120   WBC 10*3/mm3 9.81   HEMOGLOBIN g/dL 14.0   HEMATOCRIT % 42.9   PLATELETS 10*3/mm3 267   GLUCOSE mg/dL 119*   CREATININE mg/dL 0.92   BUN mg/dL 20   SODIUM mmol/L 134*   POTASSIUM mmol/L 4.9   ANION GAP mmol/L 9.0       CT Abdomen Pelvis Without Contrast    Result Date: 4/29/2024  Areas of small bowel wall thickening and likely intussusceptions noted. Differential considerations include inflammatory bowel disease, underlying mass, possibly small bowel lymphoma. Stable likely benign right renal cysts. Electronically Signed: Tha Smith MD  4/29/2024 7:13 PM EDT  Workstation ID: [id number]       I reviewed the patient's new clinical results.  I reviewed the patient's new imaging results and agree with the interpretation.    Assessment/Plan:     Active and Resolved Problems  Active Hospital Problems    Diagnosis  POA    **Fracture [T14.8XXA]  Yes      Resolved Hospital Problems   No resolved problems to display.       #Mechanical fall complicated with right acetabular nondisplaced fracture  X-ray hip reviewed orthopedics consulted recommend nonoperative management  Nonweightbearing right lower extremity for 4 to 6 weeks  Outpatient follow-up with orthopedics  Pain control with Oxy as needed.  DC IV Dilaudid discussed with patient  Continue Flexeril as needed  CT abdomen pelvis results reviewed.  Aggressive bowel regimen.  Patient will benefit from outpatient GI evaluation for screening colonoscopy  PT/OT> rehab     #COPD, not in exacerbation  -Continue current nebs     # Diabetes mellitus  Blood sugar trend reviewed acceptable  Continue ISS lispro  POC ACHS  Diabetic diet  Hold OHA     #Hypertension  Continue to hold lisinopril    DVT prophylaxis:  Mechanical DVT prophylaxis orders are present.         Code status is   Code Status and Medical Interventions:   Ordered at: 04/27/24 7295     Code Status (Patient has no  pulse and is not breathing):    CPR (Attempt to Resuscitate)     Medical Interventions (Patient has pulse or is breathing):    Full Support       Plan for disposition: Pending placement    Time: 30 minutes    Signature: Electronically signed by Sage Arias MD, 05/01/24, 11:40 EDT.  [facility] Hospitalist Team

## 2024-05-01 NOTE — PLAN OF CARE
Department of Anesthesiology  Postprocedure Note    Patient: John Jackson  MRN: [id number]  YOB: 1987  Date of evaluation: 12/19/2022      Procedure Summary     Date: 12/19/22 Room / Location: [address] [place] [address]    Anesthesia Start: 1710 Anesthesia Stop: 1822    Procedure: LAPAROSCOPIC APPENDECTOMY (Abdomen) Diagnosis:       Acute appendicitis, unspecified acute appendicitis type      (ACUTE APPENDICITIS)    Surgeons: Cortez Zimmer DO Responsible Provider: Karlene Pedersen DO    Anesthesia Type: general ASA Status: 2 - Emergent          Anesthesia Type: No value filed.     Kosta Phase I: Kosta Score: 7    Kosta Phase II:        Anesthesia Post Evaluation    Patient location during evaluation: PACU  Patient participation: complete - patient participated  Level of consciousness: awake and alert  Pain score: 2  Airway patency: patent  Nausea & Vomiting: no nausea and no vomiting  Complications: no  Cardiovascular status: hemodynamically stable  Respiratory status: acceptable  Hydration status: stable Goal Outcome Evaluation:                                          Patient with complaints of hip pain, controlled per MAR. Able to make needs known, plan of care ongoing.

## 2024-05-01 NOTE — PLAN OF CARE
Goal Outcome Evaluation:      Patient has been painful tonight, treated per MAR. Was up in the chair today, back to bed to sleep. Turned on his own, pillows in use. Care continuing.

## 2024-05-01 NOTE — PLAN OF CARE
"Assessment: Tha Barron presents with ADL impairments affecting function including balance, endurance / activity tolerance, and strength. Demonstrated functioning below baseline abilities indicate the need for continued skilled intervention while inpatient. Pt showed improving tolerance to activity this date as well as decrease need for assistance with good adherence to NWB precautions. He did however note dizziness on standing, BP taken in chair with feet up 125/77. Feet then placed on floor and assisted pt to standing where BP found to be 103/70 with return of dizzy symptoms. Tolerating session today without incident. Will continue to follow and progress as tolerated.      Plan/Recommendations:   Moderate Intensity Therapy recommended post-acute care. This is recommended as therapy feels the patient would require 3-4 days per week and wouldn't tolerate \"3 hour daily\" rehab intensity. SNF would be the preferred choice. If the patient does not agree to SNF, arrange HH or OP depending on home bound status. If patient is medically complex, consider LTACH.. Pt requires no DME at discharge.      Pt desires Skilled Rehab placement at discharge. Pt cooperative; agreeable to therapeutic recommendations and plan of care.                         "

## 2024-05-01 NOTE — CASE MANAGEMENT/SOCIAL WORK
Continued Stay Note   Ran     Patient Name: Tha Barron  MRN: [id number]  Today's Date: 5/1/2024    Admit Date: 4/27/2024    Plan: Accepted to [facility] pending precert.  PASRR  approved,   Discharge Plan       Row Name 05/01/24 1153       Plan    Plan Accepted to [facility] pending precert.  PASRR  approved,    Plan Comments DC barriers: pending precert to [facility]                        Sheela Ruiz RN

## 2024-05-01 NOTE — PLAN OF CARE
Assessment: Tha Barron presents with R nondisplaced fracture of acetabulum after a fall, and is NWB RLE for 4-6 weeks. Pt has functional mobility impairments which indicate the need for skilled intervention. Pt demo good compliance with NWB status, requiring cues for keeping foot cleared off the ground during transitions, helped with PT placing foot under his, and pt did not apply any pressure while standing. Pt required min-modAx2 for STS with RW and Genaro for transferring to bedside recliner. Pt able to take a few hop-to steps. Pt reporting dizziness afterwards, noted to have slight drop in BP with changes in position. Tolerating session today without incident. Will continue to follow and progress as tolerated.     Vitals: Hypotensive Pt reported dizziness upon transferring to bedside recliner. BP reading 125/77 sitting and 103/70 standing.

## 2024-05-01 NOTE — THERAPY TREATMENT NOTE
"Subjective: Pt agreeable to therapeutic plan of care.    Objective:     *NWB RLE    Bed mobility - Min-A for RLE.   Transfers - Min-A, Mod-A, Assist x 2, and with rolling walker. PT foot underneath pt's RLE for helping improve compliance with NWB status  Ambulation - 3 feet Min-A and with rolling walker hop-to pattern.    Therapeutic Exercise - 10 Reps B LE AROM unsupported sitting / EOB    Vitals: Hypotensive Pt reported dizziness upon transferring to bedside recliner. BP reading 125/77 sitting and 103/70 standing.    Pain: 4 VAS   Location: R hip  Intervention for pain: Repositioned, RN notified, Increased Activity, and Therapeutic Presence    Education: Provided education on the importance of mobility in the acute care setting, Verbal/Tactile Cues, Transfer Training, Gait Training, Energy conservation strategies, and WB'ing status    Assessment: Tha Barron presents with R nondisplaced fracture of acetabulum after a fall, and is NWB RLE for 4-6 weeks. Pt has functional mobility impairments which indicate the need for skilled intervention. Pt demo good compliance with NWB status, requiring cues for keeping foot cleared off the ground during transitions, helped with PT placing foot under his, and pt did not apply any pressure while standing. Pt required min-modAx2 for STS with RW and Genaro for transferring to bedside recliner. Pt able to take a few hop-to steps. Pt reporting dizziness afterwards, noted to have slight drop in BP with changes in position. Tolerating session today without incident. Will continue to follow and progress as tolerated.     Plan/Recommendations:   If medically appropriate, Moderate Intensity Therapy recommended post-acute care. This is recommended as therapy feels the patient would require 3-4 days per week and wouldn't tolerate \"3 hour daily\" rehab intensity. SNF would be the preferred choice. If the patient does not agree to SNF, arrange HH or OP depending on home bound status. " If patient is medically complex, consider LTACH. Pt requires no DME at discharge.     Pt desires Skilled Rehab placement at discharge. Pt cooperative; agreeable to therapeutic recommendations and plan of care.         Basic Mobility 6-click:  Rollin = Total, A lot = 2, A little = 3; 4 = None  Supine>Sit:   1 = Total, A lot = 2, A little = 3; 4 = None   Sit>Stand with arms:  1 = Total, A lot = 2, A little = 3; 4 = None  Bed>Chair:   1 = Total, A lot = 2, A little = 3; 4 = None  Ambulate in room:  1 = Total, A lot = 2, A little = 3; 4 = None  3-5 Steps with railin = Total, A lot = 2, A little = 3; 4 = None  Score: 15    Modified [place]: N/A = No pre-op stroke/TIA    Post-Tx Position: Up in Chair, Alarms activated, and Call light and personal items within reach  PPE: gloves and gown

## 2024-05-01 NOTE — THERAPY TREATMENT NOTE
"Subjective: Pt agreeable to therapeutic plan of care.  Cognition: oriented to Person, Place, Time, and Situation    Objective:     Bed Mobility: Min-A assistance for RLE  Functional Transfers: Min-A, Mod-A, Assist x 2, and with rolling walker     Balance: supported, static, dynamic, and standing Min-A and with rolling walker  Functional Ambulation: Min-A and with rolling walker good adherence to RLE NWB this date    Grooming: Modified-Independent  ADL Position: supported sitting  ADL Comments: Hair care      Vitals: Hypotensive; Pt reported dizziness upon transferring to bedside recliner. BP reading 125/77 sitting and 103/70 standing.     Pain: 4 VAS  Location: R hip  Interventions for pain: Repositioned, Increased Activity, and Therapeutic Presence  Education: Provided education on the importance of mobility in the acute care setting, Verbal/Tactile Cues, ADL training, and Transfer Training      Assessment: Tha Barron presents with ADL impairments affecting function including balance, endurance / activity tolerance, and strength. Demonstrated functioning below baseline abilities indicate the need for continued skilled intervention while inpatient. Pt showed improving tolerance to activity this date as well as decrease need for assistance with good adherence to NWB precautions. He did however note dizziness on standing, BP taken in chair with feet up 125/77. Feet then placed on floor and assisted pt to standing where BP found to be 103/70 with return of dizzy symptoms. Tolerating session today without incident. Will continue to follow and progress as tolerated.     Plan/Recommendations:   Moderate Intensity Therapy recommended post-acute care. This is recommended as therapy feels the patient would require 3-4 days per week and wouldn't tolerate \"3 hour daily\" rehab intensity. SNF would be the preferred choice. If the patient does not agree to SNF, arrange HH or OP depending on home bound status. If " patient is medically complex, consider LTACH.. Pt requires no DME at discharge.     Pt desires Skilled Rehab placement at discharge. Pt cooperative; agreeable to therapeutic recommendations and plan of care.     Modified [place]: N/A = No pre-op stroke/TIA    Post-Tx Position: Up in Chair, Alarms activated, and Call light and personal items within reach  PPE: gloves and gown

## 2024-05-02 LAB
BACTERIA ISLT: NORMAL
GLUCOSE BLDC GLUCOMTR-MCNC: 119 MG/DL (ref 70–105)
GLUCOSE BLDC GLUCOMTR-MCNC: 122 MG/DL (ref 70–105)
GLUCOSE BLDC GLUCOMTR-MCNC: 122 MG/DL (ref 70–105)
GLUCOSE BLDC GLUCOMTR-MCNC: 185 MG/DL (ref 70–105)

## 2024-05-02 PROCEDURE — 97110 THERAPEUTIC EXERCISES: CPT

## 2024-05-02 PROCEDURE — 97530 THERAPEUTIC ACTIVITIES: CPT

## 2024-05-02 PROCEDURE — 63710000001 INSULIN LISPRO (HUMAN) PER 5 UNITS: Performed by: INTERNAL MEDICINE

## 2024-05-02 PROCEDURE — 82948 REAGENT STRIP/BLOOD GLUCOSE: CPT | Performed by: INTERNAL MEDICINE

## 2024-05-02 PROCEDURE — 97116 GAIT TRAINING THERAPY: CPT

## 2024-05-02 PROCEDURE — 97535 SELF CARE MNGMENT TRAINING: CPT

## 2024-05-02 PROCEDURE — 82948 REAGENT STRIP/BLOOD GLUCOSE: CPT

## 2024-05-02 RX ORDER — HYDROCODONE BITARTRATE AND ACETAMINOPHEN 10; 325 MG/1; MG/1
1 TABLET ORAL EVERY 8 HOURS PRN
Qty: 9 TABLET | Refills: 0 | Status: SHIPPED | OUTPATIENT
Start: 2024-05-02 | End: 2024-05-05

## 2024-05-02 RX ORDER — POLYETHYLENE GLYCOL 3350 17 G/17G
17 POWDER, FOR SOLUTION ORAL 2 TIMES DAILY
Start: 2024-05-02 | End: 2024-05-09

## 2024-05-02 RX ADMIN — CYCLOBENZAPRINE 10 MG: 10 TABLET, FILM COATED ORAL at 16:18

## 2024-05-02 RX ADMIN — PANTOPRAZOLE SODIUM 40 MG: 40 TABLET, DELAYED RELEASE ORAL at 05:47

## 2024-05-02 RX ADMIN — BUPROPION HYDROCHLORIDE 300 MG: 150 TABLET, EXTENDED RELEASE ORAL at 08:32

## 2024-05-02 RX ADMIN — DOCUSATE SODIUM AND SENNOSIDES 2 TABLET: 8.6; 5 TABLET, FILM COATED ORAL at 21:17

## 2024-05-02 RX ADMIN — SERTRALINE 100 MG: 100 TABLET, FILM COATED ORAL at 08:32

## 2024-05-02 RX ADMIN — HYDROCODONE BITARTRATE AND ACETAMINOPHEN 1 TABLET: 5; 325 TABLET ORAL at 21:17

## 2024-05-02 RX ADMIN — HYDROCODONE BITARTRATE AND ACETAMINOPHEN 2 TABLET: 5; 325 TABLET ORAL at 05:47

## 2024-05-02 RX ADMIN — POLYETHYLENE GLYCOL 3350 17 G: 17 POWDER, FOR SOLUTION ORAL at 08:32

## 2024-05-02 RX ADMIN — CYCLOBENZAPRINE 10 MG: 10 TABLET, FILM COATED ORAL at 08:32

## 2024-05-02 RX ADMIN — ATORVASTATIN CALCIUM 20 MG: 20 TABLET, FILM COATED ORAL at 08:32

## 2024-05-02 RX ADMIN — HYDROCODONE BITARTRATE AND ACETAMINOPHEN 2 TABLET: 5; 325 TABLET ORAL at 11:58

## 2024-05-02 RX ADMIN — INSULIN LISPRO 2 UNITS: 100 INJECTION, SOLUTION INTRAVENOUS; SUBCUTANEOUS at 17:38

## 2024-05-02 RX ADMIN — Medication 10 ML: at 08:35

## 2024-05-02 RX ADMIN — ZOLPIDEM TARTRATE 10 MG: 5 TABLET ORAL at 21:17

## 2024-05-02 RX ADMIN — EMPAGLIFLOZIN 10 MG: 10 TABLET, FILM COATED ORAL at 08:32

## 2024-05-02 RX ADMIN — HYDROCODONE BITARTRATE AND ACETAMINOPHEN 2 TABLET: 5; 325 TABLET ORAL at 16:18

## 2024-05-02 RX ADMIN — TRAZODONE HYDROCHLORIDE 100 MG: 100 TABLET ORAL at 21:17

## 2024-05-02 NOTE — SIGNIFICANT NOTE
Case Management/Social Work    Patient Name:  Tha Barron  YOB: 1955  MRN: [id number]  Admit Date:  4/27/2024 05/02/24 1457   Post Acute Pre-Cert Documentation   Date Post Acute Pre-Cert Completed 05/02/24   All Clinicals Submitted? Yes   Response Received from Insurance? Approval   Post Acute Pre-Cert Initiated Comment [facility] verified SNF precert approval via [facility] on [employer]. Reference number [id number]. Valid 5/2-5/11. Approval letter indexed to media. CM made aware.           Electronically signed by:  Deepak March CMA  05/02/24 14:59 EDT    Deepak March  Case Management Associate  [address] [address]  P: [phone]  F: [phone]

## 2024-05-02 NOTE — THERAPY TREATMENT NOTE
"Subjective: Pt agreeable to therapeutic plan of care.  Cognition: oriented to Person, Place, Time, and Situation    Objective:     Bed Mobility: SBA   Functional Transfers: CGA with RW x3 trials from EOB with bed height elevated.     Balance: supported, static, dynamic, and standing CGA and with rolling walker  Functional Ambulation: CGA and with rolling walker hopping on LLE while holding the RLE above the ground without contact. Good NWB adherence.     Lower Body Dressing: Mod-A  ADL Position: supported standing and unsupported sitting  ADL Comments: At EOB, pants thread onto BLEs then in standing min assist to manage briefs over hips    Grooming: Setup  ADL position: seated EOB   ADL comments: Hair care    Vitals: WNL    Pain: 4 VAS  Location: RLE  Interventions for pain: Repositioned and Increased Activity  Education: Provided education on the importance of mobility in the acute care setting, Verbal/Tactile Cues, ADL training, and Transfer Training      Assessment: Tha Barron presents with ADL impairments affecting function including balance, endurance / activity tolerance, and pain. Demonstrated functioning below baseline abilities indicate the need for continued skilled intervention while inpatient. Pt with improved pain this date thus noted with improved activity tolerance and mobility performance. Also able to begin more complex ADL care this date, lower body dressing still requires significant assistance. Tolerating session today without incident. Will continue to follow and progress as tolerated.     Plan/Recommendations:   Moderate Intensity Therapy recommended post-acute care. This is recommended as therapy feels the patient would require 3-4 days per week and wouldn't tolerate \"3 hour daily\" rehab intensity. SNF would be the preferred choice. If the patient does not agree to SNF, arrange HH or OP depending on home bound status. If patient is medically complex, consider LTACH.. Pt requires no " DME at discharge.     Pt desires Skilled Rehab placement at discharge. Pt cooperative; agreeable to therapeutic recommendations and plan of care.     Modified [place]: N/A = No pre-op stroke/TIA    Post-Tx Position: Up in Chair, Alarms activated, and Call light and personal items within reach  PPE: gloves and gown

## 2024-05-02 NOTE — PLAN OF CARE
"Assessment: Tha Barron presents with functional mobility impairments which indicate the need for skilled intervention. Tolerating session today without incident. Pt reporting improvement in pain control this date. Pt demonstrates appropriate compliance with NWB status on RLE while performing functional tasks, and able to take a few steps with hop-to pattern to recliner chair using RW. Pt highly motivated to achieve highest level of functional independence. Will continue to follow and progress as tolerated.     Plan/Recommendations:   If medically appropriate, Moderate Intensity Therapy recommended post-acute care. This is recommended as therapy feels the patient would require 3-4 days per week and wouldn't tolerate \"3 hour daily\" rehab intensity. SNF would be the preferred choice. If the patient does not agree to SNF, arrange HH or OP depending on home bound status. If patient is medically complex, consider LTACH. Pt requires no DME at discharge.     Pt desires Skilled Rehab placement at discharge. Pt cooperative; agreeable to therapeutic recommendations and plan of care.     "

## 2024-05-02 NOTE — PLAN OF CARE
"Assessment: Tha Barron presents with ADL impairments affecting function including balance, endurance / activity tolerance, and pain. Demonstrated functioning below baseline abilities indicate the need for continued skilled intervention while inpatient. Pt with improved pain this date thus noted with improved activity tolerance and mobility performance. Also able to begin more complex ADL care this date, lower body dressing still requires significant assistance. Tolerating session today without incident. Will continue to follow and progress as tolerated.      Plan/Recommendations:   Moderate Intensity Therapy recommended post-acute care. This is recommended as therapy feels the patient would require 3-4 days per week and wouldn't tolerate \"3 hour daily\" rehab intensity. SNF would be the preferred choice. If the patient does not agree to SNF, arrange HH or OP depending on home bound status. If patient is medically complex, consider LTACH.. Pt requires no DME at discharge.      Pt desires Skilled Rehab placement at discharge. Pt cooperative; agreeable to therapeutic recommendations and plan of care.                       "

## 2024-05-02 NOTE — PLAN OF CARE
Goal Outcome Evaluation:                                            Complaints of RLE pain, relief per MAR. Able to make needs known, plan of care ongoing.

## 2024-05-02 NOTE — DISCHARGE SUMMARY
[facility] Medicine Services  Discharge Summary    Date of Service: 24  Patient Name: Tha Barron  : 1955  MRN: [id number]    Date of Admission: 2024  Discharge Diagnosis:   #Mechanical fall complicated with right acetabular nondisplaced fracture  Date of Discharge:  24  Primary Care Physician: Provider, No Known      Presenting Problem:   Fracture [T14.8XXA]  Closed nondisplaced fracture of medial wall of right acetabulum, initial encounter [S32.067A]    Active and Resolved Hospital Problems:  Active Hospital Problems    Diagnosis POA    **Fracture [T14.8XXA] Yes      Resolved Hospital Problems   No resolved problems to display.         Hospital Course     HPI:  Admitting team HPI  A 68 y.o. old male patient with PMH of COPD diabetes mellitus dyskinesia presents to the hospital with complaints of mechanical fall.  Does not have any headache dizziness blurred vision significant weakness prior to the event.  Patient does not hit the head.  Patient has a skin abrasion on the left forearm.  Other mechanical fall he heard a popping sound and started having right hip pain.  In the ED patient hemodynamically stable in the history showing there is nondisplaced fracture of the medial right acetabulum.  Patient has right hip arthroplasty many years ago.  Orthopedic surgeon consulted.        Hospital Course:  #Mechanical fall complicated with right acetabular nondisplaced fracture  X-ray hip reviewed orthopedics consulted recommend nonoperative management  Nonweightbearing right lower extremity for 4 to 6 weeks  Outpatient follow-up with orthopedics  Pain control with Oxy as needed.    Continue Flexeril as needed  CT abdomen pelvis results reviewed.  Aggressive bowel regimen.  Patient will benefit from outpatient GI evaluation for screening colonoscopy  PT/OT> rehab     #COPD, not in exacerbation  -Continue current nebs     # Diabetes mellitus  Continue home regimen       #Hypertension  Continue to hold lisinopril                  DISCHARGE Follow Up Recommendations for labs and diagnostics: Follow up with Orthopedics as out patient       Reasons For Change In Medications and Indications for New Medications:      Day of Discharge     Vital Signs:  Temp:  [98 °F (36.7 °C)-98.5 °F (36.9 °C)] 98 °F (36.7 °C)  Heart Rate:  [73-83] 83  Resp:  [14-18] 16  BP: ()/(56-77) 119/77  Flow (L/min):  [2] 2    Physical Exam:  Physical Exam AOx3 NAD  RRR S1-S2 audible  Lungs with fair air entry  Abdomen soft nontender nondistended.  Bowel sounds positive      Pertinent  and/or Most Recent Results     LAB RESULTS:      Lab 05/01/24 0120 04/29/24 2319 04/28/24 2332 04/27/24 2358 04/27/24  1403   WBC 9.81 12.19* 11.00* 10.87* 13.90*   HEMOGLOBIN 14.0 14.0 13.7 13.0 14.3   HEMATOCRIT 42.9 42.8 42.8 40.8 43.8   PLATELETS 267 280 262 259 280   NEUTROS ABS 6.72 9.16* 8.16* 8.13* 12.73*   IMMATURE GRANS (ABS) 0.05 0.05 0.05 0.07* 0.11*   LYMPHS ABS 2.02 2.07 2.01 2.08 0.74   MONOS ABS 0.80 0.70 0.59 0.50 0.29   EOS ABS 0.18 0.17 0.14 0.06 0.01   MCV 93.5 92.8 94.7 93.4 93.6         Lab 05/01/24  0120 04/28/24 2332 04/27/24 2358 04/27/24  1403   SODIUM 134* 134* 138 140   POTASSIUM 4.9 4.4 4.3 4.8   CHLORIDE 99 99 103 103   CO2 26.0 24.0 28.0 24.0   ANION GAP 9.0 11.0 7.0 13.0   BUN 20 17 19 24*   CREATININE 0.92 0.87 0.82 1.04   EGFR 90.0 93.4 95.1 77.7   GLUCOSE 119* 109* 123* 128*   CALCIUM 9.4 9.2 9.1 9.6   MAGNESIUM 1.9 1.8 2.2 1.9   PHOSPHORUS 3.8 4.4 4.1 3.2                         Brief Urine Lab Results       None          Microbiology Results (last 10 days)       Procedure Component Value - Date/Time    CANDIDA AURIS SCREEN - Swab, Axilla Right, Axilla Left and Groin [[id number]]  (Normal) Collected: 04/27/24 1500    Lab Status: Final result Specimen: Swab from Axilla Right, Axilla Left and Groin Updated: 05/02/24 1515     Candida Auris Screen Culture No Candida auris isolated at 5  days            CT Abdomen Pelvis Without Contrast    Result Date: 4/29/2024  Impression: Areas of small bowel wall thickening and likely intussusceptions noted. Differential considerations include inflammatory bowel disease, underlying mass, possibly small bowel lymphoma. Stable likely benign right renal cysts. Electronically Signed: Tha Smith MD  4/29/2024 7:13 PM EDT  Workstation ID: [id number]    XR Chest 1 View    Result Date: 4/28/2024  Impression: Impression: 1. No acute cardiopulmonary abnormality. No acute change from prior exam. Electronically Signed: Giuseppe Young  4/28/2024 10:41 AM EDT  Workstation ID: [id number]    XR Hip With or Without Pelvis 2 - 3 View Right    Result Date: 4/27/2024  Impression: Impression: 1.Nondisplaced fracture of the medial right acetabulum. 2.Status post right hip arthroplasty. No definite acute hardware complication. Electronically Signed: Asim Yin  4/27/2024 12:53 PM EDT  Workstation ID: [id number]         #Mechanical fall complicated with right acetabular nondisplaced fracture  X-ray hip reviewed orthopedics consulted recommend nonoperative management  Nonweightbearing right lower extremity for 4 to 6 weeks  Outpatient follow-up with orthopedics  Pain control with Oxy as needed.    Continue Flexeril as needed  CT abdomen pelvis results reviewed.  Aggressive bowel regimen.  Patient will benefit from outpatient GI evaluation for screening colonoscopy  PT/OT> rehab     #COPD, not in exacerbation  -Continue current nebs     # Diabetes mellitus  Resume home OHA     #Hypertension  Continue to hold lisinopril          Follow up with Orthopedics as out patient            Consults:   Consults       Date and Time Order Name Status Description    4/27/2024  1:30 PM Hospitalist (on-call MD unless specified)      4/27/2024 12:59 PM Ortho (on-call MD unless specified) Completed               Discharge Details        Discharge Medications        ASK your doctor about these medications         Instructions Start Date   Aspirin Low Dose 81 MG EC tablet  Generic drug: aspirin   81 mg, Oral, Daily      atorvastatin 20 MG tablet  Commonly known as: LIPITOR   20 mg, Oral, Nightly      buPROPion  MG 24 hr tablet  Commonly known as: WELLBUTRIN XL   300 mg, Oral, Every Morning      Farxiga 10 MG tablet  Generic drug: dapagliflozin Propanediol   10 mg, Oral, Daily      insulin glargine 100 UNIT/ML injection  Commonly known as: LANTUS, SEMGLEE   10 Units, Subcutaneous, Daily      lisinopril 40 MG tablet  Commonly known as: PRINIVIL,ZESTRIL   40 mg, Oral, Daily      meloxicam 15 MG tablet  Commonly known as: MOBIC   15 mg, Oral, Daily      metFORMIN 1000 MG tablet  Commonly known as: GLUCOPHAGE   1,000 mg, Oral, 2 Times Daily      omeprazole 40 MG capsule  Commonly known as: priLOSEC  Ask about: Which instructions should I use?   40 mg, Oral, Daily      predniSONE 20 MG tablet  Commonly known as: DELTASONE   20 mg, Oral, Daily      pyridostigmine 180 MG CR tablet  Commonly known as: MESTINON  Ask about: Which instructions should I use?   180 mg, Oral, 2 Times Daily      sertraline 100 MG tablet  Commonly known as: ZOLOFT   100 mg, Oral, Every Morning      traZODone 100 MG tablet  Commonly known as: DESYREL   200 mg, Oral, Nightly      Valbenazine Tosylate 80 MG capsule   80 mg, Oral, Every Morning      zolpidem 10 MG tablet  Commonly known as: AMBIEN  Ask about: Which instructions should I use?   10 mg, Oral, Nightly               No Known Allergies      Discharge Disposition: SITA      Diet:  Hospital:  Diet Order   Procedures    Diet: Cardiac, Diabetic; Healthy Heart (2-3 Na+); Consistent Carbohydrate; Fluid Consistency: Thin (IDDSI 0)         Discharge Activity:         CODE STATUS:  Code Status and Medical Interventions:   Ordered at: 04/27/24 1335     Code Status (Patient has no pulse and is not breathing):    CPR (Attempt to Resuscitate)     Medical Interventions (Patient has pulse or is breathing):     Full Support         No future appointments.        Time spent on Discharge including face to face service:  >30 minutes    Signature: Electronically signed by Sage Arias MD, 05/02/24, 16:02 EDT.  [facility] Hospitalist Team

## 2024-05-02 NOTE — PLAN OF CARE
Goal Outcome Evaluation:      C/o pain on right hip, medicated per MAR. Skin is warm to touch. Moves all extremities. Plan of care is ongoing. Call light within reach.

## 2024-05-02 NOTE — PROGRESS NOTES
[facility] MEDICINE SERVICE  DAILY PROGRESS NOTE    NAME: Tha Barron  : 1955  MRN: [id number]      LOS: 5 days     PROVIDER OF SERVICE: Sage Arias MD    Chief Complaint: Fracture    Subjective:     Interval History:  History taken from: patient chart RN  Pain controlled no new symptoms    Review of Systems:   Review of Systems  All negative except as above  Objective:     Vital Signs  Temp:  [98 °F (36.7 °C)-98.5 °F (36.9 °C)] 98 °F (36.7 °C)  Heart Rate:  [73-83] 83  Resp:  [14-18] 16  BP: ()/(56-77) 119/77  Flow (L/min):  [2] 2   Body mass index is 34.44 kg/m².    Physical Exam  Physical Exam  AOx3 NAD  RRR S1-S2 audible  Lungs with fair air entry  Abdomen soft nontender nondistended.  Bowel sounds positive  Scheduled Meds   atorvastatin, 20 mg, Oral, Daily  buPROPion XL, 300 mg, Oral, QAM  empagliflozin, 10 mg, Oral, Daily  insulin lispro, 2-7 Units, Subcutaneous, 4x Daily AC & at Bedtime  [Held by provider] lisinopril, 5 mg, Oral, Daily  pantoprazole, 40 mg, Oral, Q AM  senna-docusate sodium, 2 tablet, Oral, Nightly   And  polyethylene glycol, 17 g, Oral, BID  sertraline, 100 mg, Oral, QAM  sodium chloride, 10 mL, Intravenous, Q12H  traZODone, 100 mg, Oral, Nightly       PRN Meds     acetaminophen    aluminum-magnesium hydroxide-simethicone    senna-docusate sodium **AND** polyethylene glycol **AND** bisacodyl **AND** bisacodyl    Calcium Replacement - Follow Nurse / BPA Driven Protocol    cyclobenzaprine    dextrose    dextrose    dextrose    dextrose    glucagon (human recombinant)    HYDROcodone-acetaminophen    HYDROcodone-acetaminophen    ipratropium-albuterol    Magnesium Standard Dose Replacement - Follow Nurse / BPA Driven Protocol    melatonin    nitroglycerin    ondansetron ODT **OR** ondansetron    Phosphorus Replacement - Follow Nurse / BPA Driven Protocol    Potassium Replacement - Follow Nurse / BPA Driven Protocol    sodium chloride    sodium chloride     zolpidem   Infusions         Diagnostic Data    Results from last 7 days   Lab Units 05/01/24  0120   WBC 10*3/mm3 9.81   HEMOGLOBIN g/dL 14.0   HEMATOCRIT % 42.9   PLATELETS 10*3/mm3 267   GLUCOSE mg/dL 119*   CREATININE mg/dL 0.92   BUN mg/dL 20   SODIUM mmol/L 134*   POTASSIUM mmol/L 4.9   ANION GAP mmol/L 9.0       No radiology results for the last day      I reviewed the patient's new clinical results.  I reviewed the patient's new imaging results and agree with the interpretation.    Assessment/Plan:     Active and Resolved Problems  Active Hospital Problems    Diagnosis  POA    **Fracture [T14.8XXA]  Yes      Resolved Hospital Problems   No resolved problems to display.       #Mechanical fall complicated with right acetabular nondisplaced fracture  X-ray hip reviewed orthopedics consulted recommend nonoperative management  Nonweightbearing right lower extremity for 4 to 6 weeks  Outpatient follow-up with orthopedics  Pain control with Oxy as needed.  Off IV pain medications  Continue Flexeril as needed  CT abdomen pelvis results reviewed.  Aggressive bowel regimen.  Patient will benefit from outpatient GI evaluation for screening colonoscopy  PT/OT> rehab     #COPD, not in exacerbation  -Continue current nebs     # Diabetes mellitus  Blood sugar trend reviewed acceptable  Continue ISS lispro  POC ACHS  Diabetic diet  Hold OHA     #Hypertension  Continue to hold lisinopril    DVT prophylaxis:  Mechanical DVT prophylaxis orders are present.         Code status is   Code Status and Medical Interventions:   Ordered at: 04/27/24 1335     Code Status (Patient has no pulse and is not breathing):    CPR (Attempt to Resuscitate)     Medical Interventions (Patient has pulse or is breathing):    Full Support       Plan for disposition: Pending placement to rehab    Time: 30 minutes    Signature: Electronically signed by Sage Arias MD, 05/02/24, 11:49 EDT.  [facility] Hospitalist Team

## 2024-05-02 NOTE — CASE MANAGEMENT/SOCIAL WORK
Continued Stay Note   Ran     Patient Name: Tha Barron  MRN: [id number]  Today's Date: 5/2/2024    Admit Date: 4/27/2024    Plan: Accepted to [facility] pending precert. PASRR approved,   Pharmacy updated   Discharge Plan       Row Name 05/02/24 1131       Plan    Plan Accepted to [facility]] pending precert. PASRR approved,   Pharmacy updated    Plan Comments Dc barriers: precert still pending to [facility]3]                 Expected Discharge Date and Time       Expected Discharge Date Expected Discharge Time    May 3, 2024             Sheela Ruiz RN

## 2024-05-03 VITALS
HEART RATE: 76 BPM | SYSTOLIC BLOOD PRESSURE: 102 MMHG | TEMPERATURE: 99 F | OXYGEN SATURATION: 91 % | WEIGHT: 275.57 LBS | RESPIRATION RATE: 16 BRPM | BODY MASS INDEX: 34.26 KG/M2 | DIASTOLIC BLOOD PRESSURE: 80 MMHG | HEIGHT: 75 IN

## 2024-05-03 LAB — GLUCOSE BLDC GLUCOMTR-MCNC: 121 MG/DL (ref 70–105)

## 2024-05-03 PROCEDURE — 82948 REAGENT STRIP/BLOOD GLUCOSE: CPT | Performed by: INTERNAL MEDICINE

## 2024-05-03 RX ADMIN — BUPROPION HYDROCHLORIDE 300 MG: 150 TABLET, EXTENDED RELEASE ORAL at 06:19

## 2024-05-03 RX ADMIN — HYDROCODONE BITARTRATE AND ACETAMINOPHEN 1 TABLET: 5; 325 TABLET ORAL at 06:19

## 2024-05-03 RX ADMIN — ATORVASTATIN CALCIUM 20 MG: 20 TABLET, FILM COATED ORAL at 08:27

## 2024-05-03 RX ADMIN — SERTRALINE 100 MG: 100 TABLET, FILM COATED ORAL at 06:19

## 2024-05-03 RX ADMIN — PANTOPRAZOLE SODIUM 40 MG: 40 TABLET, DELAYED RELEASE ORAL at 06:19

## 2024-05-03 RX ADMIN — POLYETHYLENE GLYCOL 3350 17 G: 17 POWDER, FOR SOLUTION ORAL at 08:27

## 2024-05-03 RX ADMIN — EMPAGLIFLOZIN 10 MG: 10 TABLET, FILM COATED ORAL at 08:27

## 2024-05-03 RX ADMIN — CYCLOBENZAPRINE 10 MG: 10 TABLET, FILM COATED ORAL at 08:27

## 2024-05-03 NOTE — CASE MANAGEMENT/SOCIAL WORK
Case Management Discharge Note      Final Note: [facility]    Provided Post Acute Provider List?: Yes  Post Acute Provider List: Nursing Home, Inpatient Rehab  Delivered To: Patient  Method of Delivery: In person    Selected Continued Care - Discharged on 5/3/2024 Admission date: 4/27/2024 - Discharge disposition: Skilled Nursing Facility (DC - External)      Destination Coordination complete.      Service Provider Selected Services Address Phone Fax Patient Preferred    [facility] [address] [id number] [phone] --                          Transportation Services  W/C Van: Micky Dominguez    Final Discharge Disposition Code: 03 - skilled nursing facility (SNF)

## 2024-05-03 NOTE — PAYOR COMM NOTE
"This is discharge notification for Ion Barron   Reference/Auth # [id number]   Pt discharged to skilled nsg facility on 5/3/24.    PRISCA Lemus, RN, [facility]  Utilization Review Nurse  [facility]  Direct & confidential phone # [phone]  Fax # [phone]      Ion Barron (68 y.o. Male)       Date of Birth   1955    Social Security Number       Address   [address] [place] [address] [place] [address]    Home Phone   [phone]    MRN   [id number]       Lutheran   None    Marital Status   Single                            Admission Date   4/27/24    Admission Type   Emergency    Admitting Provider   Gillian Leal MD    Attending Provider       Department, Room/Bed   [facility] SURGICAL INPATIENT, 4119/1       Discharge Date   5/3/2024    Discharge Disposition   Skilled Nursing Facility (DC - External)    Discharge Destination                                 Attending Provider: (none)   Allergies: No Known Allergies    Isolation: Contact   Infection: Candida Auris (rule out) (04/27/24)   Code Status: CPR    Ht: 190.5 cm (75\")   Wt: 125 kg (275 lb 9.2 oz)    Admission Cmt: None   Principal Problem: Fracture [T14.8XXA]                   Active Insurance as of 4/27/2024       Primary Coverage       Payor Plan Insurance Group Employer/Plan Group    [employer] [id number]       Payor Plan Address Payor Plan Phone Number Payor Plan Fax Number Effective Dates    MAIL STOP:   4/1/2017 - None Entered    [address] [place], [place] [id number]         Subscriber Name Subscriber Birth Date Member ID       ION BARRON 1955 [id number]                     Emergency Contacts        (Rel.) Home Phone Work Phone Mobile Phone    Kinjal Varela (Sister) [phone] -- [phone]                 Discharge Summary        Sage Arias MD at 05/02/24 [address] [place] [address] " Medicine Services  Discharge Summary    Date of Service: 24  Patient Name: Tha Barron  : 1955  MRN: [id number]    Date of Admission: 2024  Discharge Diagnosis:   #Mechanical fall complicated with right acetabular nondisplaced fracture  Date of Discharge:  24  Primary Care Physician: Provider, No Known      Presenting Problem:   Fracture [T14.8XXA]  Closed nondisplaced fracture of medial wall of right acetabulum, initial encounter [S32.997A]    Active and Resolved Hospital Problems:  Active Hospital Problems    Diagnosis POA    **Fracture [T14.8XXA] Yes      Resolved Hospital Problems   No resolved problems to display.         Hospital Course     HPI:  Admitting team HPI  A 68 y.o. old male patient with PMH of COPD diabetes mellitus dyskinesia presents to the hospital with complaints of mechanical fall.  Does not have any headache dizziness blurred vision significant weakness prior to the event.  Patient does not hit the head.  Patient has a skin abrasion on the left forearm.  Other mechanical fall he heard a popping sound and started having right hip pain.  In the ED patient hemodynamically stable in the history showing there is nondisplaced fracture of the medial right acetabulum.  Patient has right hip arthroplasty many years ago.  Orthopedic surgeon consulted.        Hospital Course:  #Mechanical fall complicated with right acetabular nondisplaced fracture  X-ray hip reviewed orthopedics consulted recommend nonoperative management  Nonweightbearing right lower extremity for 4 to 6 weeks  Outpatient follow-up with orthopedics  Pain control with Oxy as needed.    Continue Flexeril as needed  CT abdomen pelvis results reviewed.  Aggressive bowel regimen.  Patient will benefit from outpatient GI evaluation for screening colonoscopy  PT/OT> rehab     #COPD, not in exacerbation  -Continue current nebs     # Diabetes mellitus  Continue home regimen      #Hypertension  Continue to hold  lisinopril                  DISCHARGE Follow Up Recommendations for labs and diagnostics: Follow up with Orthopedics as out patient       Reasons For Change In Medications and Indications for New Medications:      Day of Discharge     Vital Signs:  Temp:  [98 °F (36.7 °C)-98.5 °F (36.9 °C)] 98 °F (36.7 °C)  Heart Rate:  [73-83] 83  Resp:  [14-18] 16  BP: ()/(56-77) 119/77  Flow (L/min):  [2] 2    Physical Exam:  Physical Exam AOx3 NAD  RRR S1-S2 audible  Lungs with fair air entry  Abdomen soft nontender nondistended.  Bowel sounds positive      Pertinent  and/or Most Recent Results     LAB RESULTS:      Lab 05/01/24 0120 04/29/24 2319 04/28/24 2332 04/27/24 2358 04/27/24  1403   WBC 9.81 12.19* 11.00* 10.87* 13.90*   HEMOGLOBIN 14.0 14.0 13.7 13.0 14.3   HEMATOCRIT 42.9 42.8 42.8 40.8 43.8   PLATELETS 267 280 262 259 280   NEUTROS ABS 6.72 9.16* 8.16* 8.13* 12.73*   IMMATURE GRANS (ABS) 0.05 0.05 0.05 0.07* 0.11*   LYMPHS ABS 2.02 2.07 2.01 2.08 0.74   MONOS ABS 0.80 0.70 0.59 0.50 0.29   EOS ABS 0.18 0.17 0.14 0.06 0.01   MCV 93.5 92.8 94.7 93.4 93.6         Lab 05/01/24 0120 04/28/24 2332 04/27/24 2358 04/27/24  1403   SODIUM 134* 134* 138 140   POTASSIUM 4.9 4.4 4.3 4.8   CHLORIDE 99 99 103 103   CO2 26.0 24.0 28.0 24.0   ANION GAP 9.0 11.0 7.0 13.0   BUN 20 17 19 24*   CREATININE 0.92 0.87 0.82 1.04   EGFR 90.0 93.4 95.1 77.7   GLUCOSE 119* 109* 123* 128*   CALCIUM 9.4 9.2 9.1 9.6   MAGNESIUM 1.9 1.8 2.2 1.9   PHOSPHORUS 3.8 4.4 4.1 3.2                         Brief Urine Lab Results       None          Microbiology Results (last 10 days)       Procedure Component Value - Date/Time    CANDIDA AURIS SCREEN - Swab, Axilla Right, Axilla Left and Groin [[id number]]  (Normal) Collected: 04/27/24 1500    Lab Status: Final result Specimen: Swab from Axilla Right, Axilla Left and Groin Updated: 05/02/24 1515     Candida Auris Screen Culture No Candida auris isolated at 5 days            CT Abdomen Pelvis  Without Contrast    Result Date: 4/29/2024  Impression: Areas of small bowel wall thickening and likely intussusceptions noted. Differential considerations include inflammatory bowel disease, underlying mass, possibly small bowel lymphoma. Stable likely benign right renal cysts. Electronically Signed: Tha Smith MD  4/29/2024 7:13 PM EDT  Workstation ID: [id number]    XR Chest 1 View    Result Date: 4/28/2024  Impression: Impression: 1. No acute cardiopulmonary abnormality. No acute change from prior exam. Electronically Signed: Giuseppe Young  4/28/2024 10:41 AM EDT  Workstation ID: [id number]    XR Hip With or Without Pelvis 2 - 3 View Right    Result Date: 4/27/2024  Impression: Impression: 1.Nondisplaced fracture of the medial right acetabulum. 2.Status post right hip arthroplasty. No definite acute hardware complication. Electronically Signed: Asim Yin  4/27/2024 12:53 PM EDT  Workstation ID: [id number]         #Mechanical fall complicated with right acetabular nondisplaced fracture  X-ray hip reviewed orthopedics consulted recommend nonoperative management  Nonweightbearing right lower extremity for 4 to 6 weeks  Outpatient follow-up with orthopedics  Pain control with Oxy as needed.    Continue Flexeril as needed  CT abdomen pelvis results reviewed.  Aggressive bowel regimen.  Patient will benefit from outpatient GI evaluation for screening colonoscopy  PT/OT> rehab     #COPD, not in exacerbation  -Continue current nebs     # Diabetes mellitus  Resume home OHA     #Hypertension  Continue to hold lisinopril          Follow up with Orthopedics as out patient            Consults:   Consults       Date and Time Order Name Status Description    4/27/2024  1:30 PM Hospitalist (on-call MD unless specified)      4/27/2024 12:59 PM Ortho (on-call MD unless specified) Completed               Discharge Details        Discharge Medications        ASK your doctor about these medications        Instructions Start Date    Aspirin Low Dose 81 MG EC tablet  Generic drug: aspirin   81 mg, Oral, Daily      atorvastatin 20 MG tablet  Commonly known as: LIPITOR   20 mg, Oral, Nightly      buPROPion  MG 24 hr tablet  Commonly known as: WELLBUTRIN XL   300 mg, Oral, Every Morning      Farxiga 10 MG tablet  Generic drug: dapagliflozin Propanediol   10 mg, Oral, Daily      insulin glargine 100 UNIT/ML injection  Commonly known as: LANTUS, SEMGLEE   10 Units, Subcutaneous, Daily      lisinopril 40 MG tablet  Commonly known as: PRINIVIL,ZESTRIL   40 mg, Oral, Daily      meloxicam 15 MG tablet  Commonly known as: MOBIC   15 mg, Oral, Daily      metFORMIN 1000 MG tablet  Commonly known as: GLUCOPHAGE   1,000 mg, Oral, 2 Times Daily      omeprazole 40 MG capsule  Commonly known as: priLOSEC  Ask about: Which instructions should I use?   40 mg, Oral, Daily      predniSONE 20 MG tablet  Commonly known as: DELTASONE   20 mg, Oral, Daily      pyridostigmine 180 MG CR tablet  Commonly known as: MESTINON  Ask about: Which instructions should I use?   180 mg, Oral, 2 Times Daily      sertraline 100 MG tablet  Commonly known as: ZOLOFT   100 mg, Oral, Every Morning      traZODone 100 MG tablet  Commonly known as: DESYREL   200 mg, Oral, Nightly      Valbenazine Tosylate 80 MG capsule   80 mg, Oral, Every Morning      zolpidem 10 MG tablet  Commonly known as: AMBIEN  Ask about: Which instructions should I use?   10 mg, Oral, Nightly               No Known Allergies      Discharge Disposition: SITA      Diet:  Hospital:  Diet Order   Procedures    Diet: Cardiac, Diabetic; Healthy Heart (2-3 Na+); Consistent Carbohydrate; Fluid Consistency: Thin (IDDSI 0)         Discharge Activity:         CODE STATUS:  Code Status and Medical Interventions:   Ordered at: 04/27/24 1333     Code Status (Patient has no pulse and is not breathing):    CPR (Attempt to Resuscitate)     Medical Interventions (Patient has pulse or is breathing):    Full Support         No  future appointments.        Time spent on Discharge including face to face service:  >30 minutes    Signature: Electronically signed by Sage Arias MD, 05/02/24, 16:02 EDT.  [facility] Hospitalist Team     Electronically signed by Sage Arias MD at 05/02/24 0504

## 2025-01-13 ENCOUNTER — HOSPITAL ENCOUNTER (INPATIENT)
Facility: HOSPITAL | Age: 70
LOS: 3 days | Discharge: HOME | End: 2025-01-16
Attending: EMERGENCY MEDICINE | Admitting: STUDENT IN AN ORGANIZED HEALTH CARE EDUCATION/TRAINING PROGRAM
Payer: MEDICAID

## 2025-01-13 ENCOUNTER — APPOINTMENT (OUTPATIENT)
Dept: MRI IMAGING | Facility: HOSPITAL | Age: 70
End: 2025-01-13
Payer: MEDICAID

## 2025-01-13 ENCOUNTER — APPOINTMENT (OUTPATIENT)
Dept: GENERAL RADIOLOGY | Facility: HOSPITAL | Age: 70
End: 2025-01-13
Payer: MEDICAID

## 2025-01-13 DIAGNOSIS — J44.9 CHRONIC OBSTRUCTIVE PULMONARY DISEASE, UNSPECIFIED COPD TYPE: ICD-10-CM

## 2025-01-13 DIAGNOSIS — L97.512 DIABETIC ULCER OF TOE OF RIGHT FOOT ASSOCIATED WITH TYPE 2 DIABETES MELLITUS, WITH FAT LAYER EXPOSED: ICD-10-CM

## 2025-01-13 DIAGNOSIS — L97.522 DIABETIC ULCER OF TOE OF LEFT FOOT ASSOCIATED WITH TYPE 2 DIABETES MELLITUS, WITH FAT LAYER EXPOSED: ICD-10-CM

## 2025-01-13 DIAGNOSIS — A41.9 SEPSIS, DUE TO UNSPECIFIED ORGANISM, UNSPECIFIED WHETHER ACUTE ORGAN DYSFUNCTION PRESENT: Primary | ICD-10-CM

## 2025-01-13 DIAGNOSIS — E11.621 DIABETIC ULCER OF TOE OF LEFT FOOT ASSOCIATED WITH TYPE 2 DIABETES MELLITUS, WITH FAT LAYER EXPOSED: ICD-10-CM

## 2025-01-13 DIAGNOSIS — M86.9 OSTEOMYELITIS OF GREAT TOE OF LEFT FOOT: ICD-10-CM

## 2025-01-13 DIAGNOSIS — E11.621 DIABETIC ULCER OF TOE OF RIGHT FOOT ASSOCIATED WITH TYPE 2 DIABETES MELLITUS, WITH FAT LAYER EXPOSED: ICD-10-CM

## 2025-01-13 DIAGNOSIS — E11.65 UNCONTROLLED TYPE 2 DIABETES MELLITUS WITH HYPERGLYCEMIA: ICD-10-CM

## 2025-01-13 DIAGNOSIS — L03.116 CELLULITIS OF LEFT FOOT: ICD-10-CM

## 2025-01-13 LAB
ALBUMIN SERPL-MCNC: 3.7 G/DL (ref 3.5–5.2)
ALBUMIN/GLOB SERPL: 1.2 G/DL
ALP SERPL-CCNC: 54 U/L (ref 39–117)
ALT SERPL W P-5'-P-CCNC: 42 U/L (ref 1–41)
ANION GAP SERPL CALCULATED.3IONS-SCNC: 14.4 MMOL/L (ref 5–15)
AST SERPL-CCNC: 47 U/L (ref 1–40)
BASOPHILS # BLD AUTO: 0.02 10*3/MM3 (ref 0–0.2)
BASOPHILS NFR BLD AUTO: 0.2 % (ref 0–1.5)
BILIRUB SERPL-MCNC: 0.2 MG/DL (ref 0–1.2)
BILIRUB UR QL STRIP: NEGATIVE
BUN SERPL-MCNC: 14 MG/DL (ref 8–23)
BUN/CREAT SERPL: 13.6 (ref 7–25)
CALCIUM SPEC-SCNC: 9.2 MG/DL (ref 8.6–10.5)
CHLORIDE SERPL-SCNC: 102 MMOL/L (ref 98–107)
CLARITY UR: CLEAR
CO2 SERPL-SCNC: 22.6 MMOL/L (ref 22–29)
COLOR UR: YELLOW
CREAT SERPL-MCNC: 1.03 MG/DL (ref 0.76–1.27)
CRP SERPL-MCNC: 0.54 MG/DL (ref 0–0.5)
D-LACTATE SERPL-SCNC: 1.8 MMOL/L (ref 0.5–2)
D-LACTATE SERPL-SCNC: 2.6 MMOL/L (ref 0.3–2)
D-LACTATE SERPL-SCNC: 3.2 MMOL/L (ref 0.5–2)
DEPRECATED RDW RBC AUTO: 46.3 FL (ref 37–54)
EGFRCR SERPLBLD CKD-EPI 2021: 78.1 ML/MIN/1.73
EOSINOPHIL # BLD AUTO: 0.06 10*3/MM3 (ref 0–0.4)
EOSINOPHIL NFR BLD AUTO: 0.6 % (ref 0.3–6.2)
ERYTHROCYTE [DISTWIDTH] IN BLOOD BY AUTOMATED COUNT: 13.2 % (ref 12.3–15.4)
ERYTHROCYTE [SEDIMENTATION RATE] IN BLOOD: 31 MM/HR (ref 0–20)
GLOBULIN UR ELPH-MCNC: 3 GM/DL
GLUCOSE SERPL-MCNC: 115 MG/DL (ref 65–99)
GLUCOSE UR STRIP-MCNC: ABNORMAL MG/DL
HCT VFR BLD AUTO: 41 % (ref 37.5–51)
HGB BLD-MCNC: 13.2 G/DL (ref 13–17.7)
HGB UR QL STRIP.AUTO: NEGATIVE
IMM GRANULOCYTES # BLD AUTO: 0.05 10*3/MM3 (ref 0–0.05)
IMM GRANULOCYTES NFR BLD AUTO: 0.5 % (ref 0–0.5)
KETONES UR QL STRIP: NEGATIVE
LEUKOCYTE ESTERASE UR QL STRIP.AUTO: NEGATIVE
LYMPHOCYTES # BLD AUTO: 0.89 10*3/MM3 (ref 0.7–3.1)
LYMPHOCYTES NFR BLD AUTO: 8.2 % (ref 19.6–45.3)
MCH RBC QN AUTO: 30.7 PG (ref 26.6–33)
MCHC RBC AUTO-ENTMCNC: 32.2 G/DL (ref 31.5–35.7)
MCV RBC AUTO: 95.3 FL (ref 79–97)
MONOCYTES # BLD AUTO: 0.34 10*3/MM3 (ref 0.1–0.9)
MONOCYTES NFR BLD AUTO: 3.1 % (ref 5–12)
MRSA DNA SPEC QL NAA+PROBE: NORMAL
NEUTROPHILS NFR BLD AUTO: 87.4 % (ref 42.7–76)
NEUTROPHILS NFR BLD AUTO: 9.49 10*3/MM3 (ref 1.7–7)
NITRITE UR QL STRIP: NEGATIVE
NRBC BLD AUTO-RTO: 0 /100 WBC (ref 0–0.2)
PH UR STRIP.AUTO: <=5 [PH] (ref 5–8)
PLATELET # BLD AUTO: 269 10*3/MM3 (ref 140–450)
PMV BLD AUTO: 10.2 FL (ref 6–12)
POTASSIUM SERPL-SCNC: 4.4 MMOL/L (ref 3.5–5.2)
PROCALCITONIN SERPL-MCNC: 0.05 NG/ML (ref 0–0.25)
PROT SERPL-MCNC: 6.7 G/DL (ref 6–8.5)
PROT UR QL STRIP: NEGATIVE
RBC # BLD AUTO: 4.3 10*6/MM3 (ref 4.14–5.8)
SODIUM SERPL-SCNC: 139 MMOL/L (ref 136–145)
SP GR UR STRIP: 1.01 (ref 1–1.03)
UROBILINOGEN UR QL STRIP: ABNORMAL
WBC NRBC COR # BLD AUTO: 10.85 10*3/MM3 (ref 3.4–10.8)

## 2025-01-13 PROCEDURE — 85652 RBC SED RATE AUTOMATED: CPT | Performed by: EMERGENCY MEDICINE

## 2025-01-13 PROCEDURE — 83605 ASSAY OF LACTIC ACID: CPT | Performed by: EMERGENCY MEDICINE

## 2025-01-13 PROCEDURE — 94761 N-INVAS EAR/PLS OXIMETRY MLT: CPT

## 2025-01-13 PROCEDURE — 84145 PROCALCITONIN (PCT): CPT

## 2025-01-13 PROCEDURE — 87040 BLOOD CULTURE FOR BACTERIA: CPT | Performed by: EMERGENCY MEDICINE

## 2025-01-13 PROCEDURE — 90715 TDAP VACCINE 7 YRS/> IM: CPT | Performed by: EMERGENCY MEDICINE

## 2025-01-13 PROCEDURE — 80053 COMPREHEN METABOLIC PANEL: CPT | Performed by: EMERGENCY MEDICINE

## 2025-01-13 PROCEDURE — 87641 MR-STAPH DNA AMP PROBE: CPT

## 2025-01-13 PROCEDURE — 81003 URINALYSIS AUTO W/O SCOPE: CPT | Performed by: EMERGENCY MEDICINE

## 2025-01-13 PROCEDURE — 25810000003 SEPSIS FLUID NS 0.9 % SOLUTION: Performed by: EMERGENCY MEDICINE

## 2025-01-13 PROCEDURE — 25010000002 AMPICILLIN-SULBACTAM PER 1.5 G: Performed by: EMERGENCY MEDICINE

## 2025-01-13 PROCEDURE — 36415 COLL VENOUS BLD VENIPUNCTURE: CPT

## 2025-01-13 PROCEDURE — 25010000002 TETANUS-DIPHTH-ACELL PERTUSSIS 5-2.5-18.5 LF-MCG/0.5 SUSPENSION PREFILLED SYRINGE: Performed by: EMERGENCY MEDICINE

## 2025-01-13 PROCEDURE — 86140 C-REACTIVE PROTEIN: CPT

## 2025-01-13 PROCEDURE — 94799 UNLISTED PULMONARY SVC/PX: CPT

## 2025-01-13 PROCEDURE — 85025 COMPLETE CBC W/AUTO DIFF WBC: CPT | Performed by: EMERGENCY MEDICINE

## 2025-01-13 PROCEDURE — 90471 IMMUNIZATION ADMIN: CPT | Performed by: EMERGENCY MEDICINE

## 2025-01-13 PROCEDURE — 25010000002 VANCOMYCIN 2-0.9 GM/500ML-% SOLUTION: Performed by: EMERGENCY MEDICINE

## 2025-01-13 PROCEDURE — 73620 X-RAY EXAM OF FOOT: CPT

## 2025-01-13 PROCEDURE — 94640 AIRWAY INHALATION TREATMENT: CPT

## 2025-01-13 PROCEDURE — 99285 EMERGENCY DEPT VISIT HI MDM: CPT

## 2025-01-13 RX ORDER — BUDESONIDE AND FORMOTEROL FUMARATE DIHYDRATE 160; 4.5 UG/1; UG/1
2 AEROSOL RESPIRATORY (INHALATION)
COMMUNITY

## 2025-01-13 RX ORDER — ACETAMINOPHEN 325 MG/1
650 TABLET ORAL EVERY 4 HOURS PRN
Status: DISCONTINUED | OUTPATIENT
Start: 2025-01-13 | End: 2025-01-16 | Stop reason: HOSPADM

## 2025-01-13 RX ORDER — PANTOPRAZOLE SODIUM 40 MG/1
40 TABLET, DELAYED RELEASE ORAL
Status: DISCONTINUED | OUTPATIENT
Start: 2025-01-14 | End: 2025-01-16 | Stop reason: HOSPADM

## 2025-01-13 RX ORDER — MELOXICAM 15 MG/1
15 TABLET ORAL DAILY
Status: DISCONTINUED | OUTPATIENT
Start: 2025-01-14 | End: 2025-01-14

## 2025-01-13 RX ORDER — TRAZODONE HYDROCHLORIDE 100 MG/1
200 TABLET ORAL NIGHTLY
Status: DISCONTINUED | OUTPATIENT
Start: 2025-01-13 | End: 2025-01-16 | Stop reason: HOSPADM

## 2025-01-13 RX ORDER — LISINOPRIL 40 MG/1
40 TABLET ORAL DAILY
COMMUNITY

## 2025-01-13 RX ORDER — AMOXICILLIN 250 MG
2 CAPSULE ORAL 2 TIMES DAILY PRN
Status: DISCONTINUED | OUTPATIENT
Start: 2025-01-13 | End: 2025-01-16 | Stop reason: HOSPADM

## 2025-01-13 RX ORDER — ACETAMINOPHEN 650 MG/1
650 SUPPOSITORY RECTAL EVERY 4 HOURS PRN
Status: DISCONTINUED | OUTPATIENT
Start: 2025-01-13 | End: 2025-01-16 | Stop reason: HOSPADM

## 2025-01-13 RX ORDER — ONDANSETRON 2 MG/ML
4 INJECTION INTRAMUSCULAR; INTRAVENOUS EVERY 6 HOURS PRN
Status: DISCONTINUED | OUTPATIENT
Start: 2025-01-13 | End: 2025-01-16 | Stop reason: HOSPADM

## 2025-01-13 RX ORDER — LISINOPRIL 20 MG/1
40 TABLET ORAL DAILY
Status: DISCONTINUED | OUTPATIENT
Start: 2025-01-14 | End: 2025-01-16 | Stop reason: HOSPADM

## 2025-01-13 RX ORDER — ASPIRIN 81 MG/1
81 TABLET ORAL DAILY
Status: DISCONTINUED | OUTPATIENT
Start: 2025-01-14 | End: 2025-01-16 | Stop reason: HOSPADM

## 2025-01-13 RX ORDER — PREDNISONE 20 MG/1
20 TABLET ORAL DAILY
Status: DISCONTINUED | OUTPATIENT
Start: 2025-01-14 | End: 2025-01-16 | Stop reason: HOSPADM

## 2025-01-13 RX ORDER — OXYCODONE HYDROCHLORIDE 5 MG/1
5 TABLET ORAL EVERY 6 HOURS PRN
Status: DISCONTINUED | OUTPATIENT
Start: 2025-01-13 | End: 2025-01-16 | Stop reason: HOSPADM

## 2025-01-13 RX ORDER — BISACODYL 10 MG
10 SUPPOSITORY, RECTAL RECTAL DAILY PRN
Status: DISCONTINUED | OUTPATIENT
Start: 2025-01-13 | End: 2025-01-16 | Stop reason: HOSPADM

## 2025-01-13 RX ORDER — POLYETHYLENE GLYCOL 3350 17 G/17G
17 POWDER, FOR SOLUTION ORAL DAILY PRN
Status: DISCONTINUED | OUTPATIENT
Start: 2025-01-13 | End: 2025-01-16 | Stop reason: HOSPADM

## 2025-01-13 RX ORDER — ZOLPIDEM TARTRATE 5 MG/1
10 TABLET ORAL NIGHTLY
Status: DISCONTINUED | OUTPATIENT
Start: 2025-01-13 | End: 2025-01-15

## 2025-01-13 RX ORDER — VARENICLINE TARTRATE 0.5 MG/1
0.5 TABLET, FILM COATED ORAL 2 TIMES DAILY
COMMUNITY

## 2025-01-13 RX ORDER — ACETAMINOPHEN 160 MG/5ML
650 SOLUTION ORAL EVERY 4 HOURS PRN
Status: DISCONTINUED | OUTPATIENT
Start: 2025-01-13 | End: 2025-01-16 | Stop reason: HOSPADM

## 2025-01-13 RX ORDER — BUPROPION HYDROCHLORIDE 150 MG/1
300 TABLET ORAL DAILY
Status: DISCONTINUED | OUTPATIENT
Start: 2025-01-14 | End: 2025-01-16 | Stop reason: HOSPADM

## 2025-01-13 RX ORDER — PYRIDOSTIGMINE BROMIDE 60 MG/1
180 TABLET ORAL EVERY 12 HOURS SCHEDULED
Status: DISCONTINUED | OUTPATIENT
Start: 2025-01-13 | End: 2025-01-16 | Stop reason: HOSPADM

## 2025-01-13 RX ORDER — BUDESONIDE AND FORMOTEROL FUMARATE DIHYDRATE 160; 4.5 UG/1; UG/1
2 AEROSOL RESPIRATORY (INHALATION)
Status: DISCONTINUED | OUTPATIENT
Start: 2025-01-13 | End: 2025-01-16 | Stop reason: HOSPADM

## 2025-01-13 RX ORDER — BISACODYL 5 MG/1
5 TABLET, DELAYED RELEASE ORAL DAILY PRN
Status: DISCONTINUED | OUTPATIENT
Start: 2025-01-13 | End: 2025-01-16 | Stop reason: HOSPADM

## 2025-01-13 RX ORDER — ONDANSETRON 4 MG/1
4 TABLET, ORALLY DISINTEGRATING ORAL EVERY 6 HOURS PRN
Status: DISCONTINUED | OUTPATIENT
Start: 2025-01-13 | End: 2025-01-16 | Stop reason: HOSPADM

## 2025-01-13 RX ORDER — ATORVASTATIN CALCIUM 20 MG/1
20 TABLET, FILM COATED ORAL NIGHTLY
Status: DISCONTINUED | OUTPATIENT
Start: 2025-01-13 | End: 2025-01-16 | Stop reason: HOSPADM

## 2025-01-13 RX ORDER — VANCOMYCIN 2 GRAM/500 ML IN 0.9 % SODIUM CHLORIDE INTRAVENOUS
20 ONCE
Status: COMPLETED | OUTPATIENT
Start: 2025-01-13 | End: 2025-01-13

## 2025-01-13 RX ORDER — INSULIN GLARGINE 100 [IU]/ML
10 INJECTION, SOLUTION SUBCUTANEOUS DAILY
Status: DISCONTINUED | OUTPATIENT
Start: 2025-01-14 | End: 2025-01-16 | Stop reason: HOSPADM

## 2025-01-13 RX ADMIN — OXYCODONE 5 MG: 5 TABLET ORAL at 19:18

## 2025-01-13 RX ADMIN — TRAZODONE HYDROCHLORIDE 200 MG: 100 TABLET ORAL at 22:07

## 2025-01-13 RX ADMIN — SODIUM CHLORIDE 2937 ML: 9 INJECTION, SOLUTION INTRAVENOUS at 13:49

## 2025-01-13 RX ADMIN — ZOLPIDEM TARTRATE 10 MG: 5 TABLET ORAL at 22:08

## 2025-01-13 RX ADMIN — TETANUS TOXOID, REDUCED DIPHTHERIA TOXOID AND ACELLULAR PERTUSSIS VACCINE, ADSORBED 0.5 ML: 5; 2.5; 8; 8; 2.5 SUSPENSION INTRAMUSCULAR at 12:56

## 2025-01-13 RX ADMIN — AMPICILLIN SODIUM AND SULBACTAM SODIUM 3 G: 2; 1 INJECTION, POWDER, FOR SOLUTION INTRAMUSCULAR; INTRAVENOUS at 13:20

## 2025-01-13 RX ADMIN — PYRIDOSTIGMINE BROMIDE 180 MG: 60 TABLET ORAL at 22:07

## 2025-01-13 RX ADMIN — Medication 2000 MG: at 15:13

## 2025-01-13 RX ADMIN — ATORVASTATIN CALCIUM 20 MG: 20 TABLET, FILM COATED ORAL at 22:08

## 2025-01-13 RX ADMIN — BUDESONIDE AND FORMOTEROL FUMARATE DIHYDRATE 2 PUFF: 160; 4.5 AEROSOL RESPIRATORY (INHALATION) at 19:40

## 2025-01-13 NOTE — PLAN OF CARE
Goal Outcome Evaluation:                                          Pt A&O x4. Pt able to make needs known. Call light within reach. Podiatry consult called. Pain treated per MAR. Plan of care ongoing

## 2025-01-13 NOTE — ED NOTES
Nursing report ED to floor  Tha Barron  69 y.o.  male    HPI:   Chief Complaint   Patient presents with    Wound Check       Admitting doctor:   Adonis Oliver MD    Admitting diagnosis:   The primary encounter diagnosis was Sepsis, due to unspecified organism, unspecified whether acute organ dysfunction present. Diagnoses of Cellulitis of left foot, Diabetic ulcer of toe of right foot associated with type 2 diabetes mellitus, with fat layer exposed, Diabetic ulcer of toe of left foot associated with type 2 diabetes mellitus, with fat layer exposed, Uncontrolled type 2 diabetes mellitus with hyperglycemia, and Osteomyelitis of great toe of left foot were also pertinent to this visit.    Code status:   Current Code Status       Date Active Code Status Order ID Comments User Context       Prior            Allergies:   Patient has no known allergies.    Isolation:  No active isolations     Fall Risk:  Fall Risk Assessment was completed, and patient is at low risk for falls.   Predictive Model Details         6 (Low) Factor Value    Calculated 1/13/2025 13:59 Age 69    Risk of Fall Model Number of administrations of Toxoids 1     Active Peripheral IV Present     Imaging order in this encounter Present     Skin Assessment X     Respiratory Rate 18     Magnesium not on file     Number of Distinct Medication Classes administered 3     Diastolic BP 70     Tobacco Use Current     Ravinder Scale not on file     Albumin 3.7 g/dL     Clinically Relevant Sex Not Female     ALT 42 U/L     Calcium 9.2 mg/dL     Chloride 102 mmol/L     Potassium 4.4 mmol/L     Days after Admission 0.08     Creatinine 1.03 mg/dL     Total Bilirubin 0.2 mg/dL         Weight:       01/13/25  1202   Weight: 118 kg (260 lb)       Intake and Output  No intake or output data in the 24 hours ending 01/13/25 1402    Diet:        Most recent vitals:   Vitals:    01/13/25 1202 01/13/25 1220 01/13/25 1318 01/13/25 1322   BP: 113/91 102/66  118/70  "  BP Location:    Left arm   Patient Position:    Lying   Pulse: 80 83 69    Resp: 18      Temp: 98.9 °F (37.2 °C)      TempSrc: Oral      SpO2: 95% 92% 92%    Weight: 118 kg (260 lb)      Height: 190.5 cm (75\")          Active LDAs/IV Access:   Lines, Drains & Airways       Active LDAs       Name Placement date Placement time Site Days    Peripheral IV 01/13/25 1243 Posterior;Right Forearm 01/13/25  1243  Forearm  less than 1                    Skin Condition:   Skin Assessments (last day)       Date/Time Skin WDL    01/13/25 12:18:34 X     Skin WDL: L great toe red, swollen, hot / R great toe partial toenail removed, looks possibly infected at 01/13/25 1218             Labs (abnormal labs have a star):   Labs Reviewed   COMPREHENSIVE METABOLIC PANEL - Abnormal; Notable for the following components:       Result Value    Glucose 115 (*)     ALT (SGPT) 42 (*)     AST (SGOT) 47 (*)     All other components within normal limits    Narrative:     GFR Categories in Chronic Kidney Disease (CKD)      GFR Category          GFR (mL/min/1.73)    Interpretation  G1                     90 or greater         Normal or high (1)  G2                      60-89                Mild decrease (1)  G3a                   45-59                Mild to moderate decrease  G3b                   30-44                Moderate to severe decrease  G4                    15-29                Severe decrease  G5                    14 or less           Kidney failure          (1)In the absence of evidence of kidney disease, neither GFR category G1 or G2 fulfill the criteria for CKD.    eGFR calculation 2021 CKD-EPI creatinine equation, which does not include race as a factor   URINALYSIS W/ CULTURE IF INDICATED - Abnormal; Notable for the following components:    Glucose, UA >=1000 mg/dL (3+) (*)     All other components within normal limits    Narrative:     In absence of clinical symptoms, the presence of pyuria, bacteria, and/or nitrites on the " urinalysis result does not correlate with infection.  Urine microscopic not indicated.   SEDIMENTATION RATE - Abnormal; Notable for the following components:    Sed Rate 31 (*)     All other components within normal limits   CBC WITH AUTO DIFFERENTIAL - Abnormal; Notable for the following components:    WBC 10.85 (*)     Neutrophil % 87.4 (*)     Lymphocyte % 8.2 (*)     Monocyte % 3.1 (*)     Neutrophils, Absolute 9.49 (*)     All other components within normal limits   POC LACTATE - Abnormal; Notable for the following components:    Lactate 2.6 (*)     All other components within normal limits   BLOOD CULTURE   BLOOD CULTURE   LACTIC ACID, REFLEX   PROCALCITONIN   CBC AND DIFFERENTIAL    Narrative:     The following orders were created for panel order CBC & Differential.  Procedure                               Abnormality         Status                     ---------                               -----------         ------                     CBC Auto Differential[[id number]]        Abnormal            Final result                 Please view results for these tests on the individual orders.       LOC: Person, Place, Time, and Situation    Telemetry:  Med/Surg    Cardiac Monitoring Ordered: no    EKG:   No orders to display       Medications Given in the ED:   Medications   sepsis fluid NS 0.9 % bolus 2,937 mL (2,937 mL Intravenous New Bag 1/13/25 1349)   vancomycin IVPB 2000 mg in 0.9% Sodium Chloride 500 mL (has no administration in time range)   Tetanus-Diphth-Acell Pertussis (BOOSTRIX) injection 0.5 mL (0.5 mL Intramuscular Given 1/13/25 1256)   ampicillin-sulbactam (UNASYN) 3 g in sodium chloride 0.9 % 100 mL MBP (0 g Intravenous Stopped 1/13/25 1341)       Imaging results:  XR Foot 2 View Bilateral    Result Date: 1/13/2025  Impression: 1.Relative nondisplaced comminuted fracture involving the distal phalanx of the left first toe with likely underlying/superimposed osteomyelitis. Correlate with symptoms and  history. Electronically Signed: Marko Dimas MD  1/13/2025 1:55 PM EST  Workstation ID: [id number]     Social issues:   Social History     Socioeconomic History    Marital status: Single   Tobacco Use    Smoking status: Every Day     Current packs/day: 1.00     Average packs/day: 1 pack/day for 40.0 years (40.0 ttl pk-yrs)     Types: Cigarettes    Smokeless tobacco: Never   Vaping Use    Vaping status: Never Used    Passive vaping exposure: Yes   Substance and Sexual Activity    Alcohol use: Never    Drug use: Never    Sexual activity: Not Currently       NIH Stroke Scale:  Interval: (not recorded)  1a. Level of Consciousness: (not recorded)  1b. LOC Questions: (not recorded)  1c. LOC Commands: (not recorded)  2. Best Gaze: (not recorded)  3. Visual: (not recorded)  4. Facial Palsy: (not recorded)  5a. Motor Arm, Left: (not recorded)  5b. Motor Arm, Right: (not recorded)  6a. Motor Leg, Left: (not recorded)  6b. Motor Leg, Right: (not recorded)  7. Limb Ataxia: (not recorded)  8. Sensory: (not recorded)  9. Best Language: (not recorded)  10. Dysarthria: (not recorded)  11. Extinction and Inattention (formerly Neglect): (not recorded)    Total (NIH Stroke Scale): (not recorded)     Additional notable assessment information:     Nursing report ED to floor:  Minda Moy RN   01/13/25 14:02 EST

## 2025-01-13 NOTE — ED NOTES
At time of handoff pt was receiving his first liter of his sepsis fluid bolus. Receiving nurse notified.

## 2025-01-13 NOTE — ED PROVIDER NOTES
Subjective   History of Present Illness  69-year-old male complaining of several day history of increasing redness and drainage from his foot.  Patient states the right started before the left but the left has progressed faster he reports now has redness all the way back to the ankle joint dorsally.  He states he has had subjective fever and chills.  The patient injects himself with insulin but states that his glucometer has been broken and he cannot afford a replacement.  He states he lives in a many home.  The patient reports that he has had subjective fever and chills within the last couple of days.  He reports increasing drainage in the last couple of days.  He states he suffers from longstanding diabetic neuropathy in his feet the patient has had complications of diabetes in the past.      Review of Systems   Constitutional:  Positive for chills, fatigue and fever.   Endocrine: Positive for polydipsia and polyuria.   Skin:  Positive for wound.   Neurological:  Positive for numbness.   All other systems reviewed and are negative.      Past Medical History:   Diagnosis Date    COPD (chronic obstructive pulmonary disease)     Diabetes mellitus     Myasthenia gravis     Tardive dyskinesia    Patient states she has had previous diabetic education but cannot afford the glucometer    No Known Allergies    Past Surgical History:   Procedure Laterality Date    CHOLECYSTECTOMY N/A 2/15/2023    Procedure: CHOLECYSTECTOMY LAPAROSCOPIC WITH DAVINCI ROBOT;  Surgeon: Tha Franklin MD;  Location: [facility];  Service: Robotics - DaVinci;  Laterality: N/A;       No family history on file.    Social History     Socioeconomic History    Marital status: Single   Tobacco Use    Smoking status: Every Day     Current packs/day: 1.00     Average packs/day: 1 pack/day for 40.0 years (40.0 ttl pk-yrs)     Types: Cigarettes    Smokeless tobacco: Never   Vaping Use    Vaping status: Never Used    Passive vaping exposure:  Yes   Substance and Sexual Activity    Alcohol use: Never    Drug use: Never    Sexual activity: Not Currently       Resident of a [facility] home    Objective   Physical Exam  Alert [place] Coma Scale 15   HEENT: Pupils equal and reactive to light. Conjunctivae are not injected. Normal tympanic membranes. Oropharynx and nares are normal.   Neck: Supple. Midline trachea. No JVD. No goiter.   Chest: Clear and equal breath sounds bilaterally, regular rate and rhythm without murmur or rub.   Abdomen: Positive bowel sounds, nontender, nondistended. No rebound or peritoneal signs. No CVA tenderness.   Extremities no clubbing. cyanosis or edema. Motor exam is normal. The full range of motion is intact patient has decreased sensation to both feet.  There is a diabetic toe apparent in the great toe on the right with erythema and swelling noted to the distal phalanx.  Purulent drainage is noted there is a central small area of necrosis.  On the left foot there is erythema that extends back to the anterior portion of the joint.  There is no palpable crepitance.  The patient has erythema and swelling particular in the great toe where there is a diabetic ulcer approximately 3 cm in diameter on the distal phalange   Skin: Warm and dry, no rashes or petechia.   Lymphatic: No regional lymphadenopathy. No calf pain, swelling or Homans sign    Procedures           ED Course  ED Course as of 01/13/25 1402   Mon Jan 13, 2025   1401 Due to significant overcrowding in the emergency department patient was evaluated by myself in a hallway bed.  This examination might be limited by privacy concerns, noise, exposure issues, and the patient not wearing a hospital gown.  Explained to the patient our limitations and our overcrowding.  They were in agreement to continue the exam and treatment at this time. [TH]      ED Course User Index  [TH] Jonathan Jean MD        Labs Reviewed   COMPREHENSIVE METABOLIC PANEL - Abnormal; Notable for the  following components:       Result Value    Glucose 115 (*)     ALT (SGPT) 42 (*)     AST (SGOT) 47 (*)     All other components within normal limits    Narrative:     GFR Categories in Chronic Kidney Disease (CKD)      GFR Category          GFR (mL/min/1.73)    Interpretation  G1                     90 or greater         Normal or high (1)  G2                      60-89                Mild decrease (1)  G3a                   45-59                Mild to moderate decrease  G3b                   30-44                Moderate to severe decrease  G4                    15-29                Severe decrease  G5                    14 or less           Kidney failure          (1)In the absence of evidence of kidney disease, neither GFR category G1 or G2 fulfill the criteria for CKD.    eGFR calculation 2021 CKD-EPI creatinine equation, which does not include race as a factor   URINALYSIS W/ CULTURE IF INDICATED - Abnormal; Notable for the following components:    Glucose, UA >=1000 mg/dL (3+) (*)     All other components within normal limits    Narrative:     In absence of clinical symptoms, the presence of pyuria, bacteria, and/or nitrites on the urinalysis result does not correlate with infection.  Urine microscopic not indicated.   SEDIMENTATION RATE - Abnormal; Notable for the following components:    Sed Rate 31 (*)     All other components within normal limits   CBC WITH AUTO DIFFERENTIAL - Abnormal; Notable for the following components:    WBC 10.85 (*)     Neutrophil % 87.4 (*)     Lymphocyte % 8.2 (*)     Monocyte % 3.1 (*)     Neutrophils, Absolute 9.49 (*)     All other components within normal limits   POC LACTATE - Abnormal; Notable for the following components:    Lactate 2.6 (*)     All other components within normal limits   BLOOD CULTURE   BLOOD CULTURE   LACTIC ACID, REFLEX   PROCALCITONIN   CBC AND DIFFERENTIAL    Narrative:     The following orders were created for panel order CBC &  Differential.  Procedure                               Abnormality         Status                     ---------                               -----------         ------                     CBC Auto Differential[[id number]]        Abnormal            Final result                 Please view results for these tests on the individual orders.     Medications   sepsis fluid NS 0.9 % bolus 2,937 mL (2,937 mL Intravenous New Bag 1/13/25 1349)   vancomycin IVPB 2000 mg in 0.9% Sodium Chloride 500 mL (has no administration in time range)   Tetanus-Diphth-Acell Pertussis (BOOSTRIX) injection 0.5 mL (0.5 mL Intramuscular Given 1/13/25 1256)   ampicillin-sulbactam (UNASYN) 3 g in sodium chloride 0.9 % 100 mL MBP (0 g Intravenous Stopped 1/13/25 1341)                                                    Medical Decision Making  Patient had x-rays to look for radiographic evidence of osteomyelitis.  Cultures were obtained and the patient was started on Unasyn and vancomycin.  Patient will need admission to the hospital for control of his blood sugars.  The patient would benefit from diabetic education and supplies as well he was agreeable to this plan of treatment Case was discussed the hospitalist service    Problems Addressed:  Cellulitis of left foot: complicated acute illness or injury  Diabetic ulcer of toe of left foot associated with type 2 diabetes mellitus, with fat layer exposed: complicated acute illness or injury  Diabetic ulcer of toe of right foot associated with type 2 diabetes mellitus, with fat layer exposed: complicated acute illness or injury  Sepsis, due to unspecified organism, unspecified whether acute organ dysfunction present: complicated acute illness or injury  Uncontrolled type 2 diabetes mellitus with hyperglycemia: complicated acute illness or injury    Amount and/or Complexity of Data Reviewed  Labs: ordered. Decision-making details documented in ED Course.  Radiology: ordered and independent  interpretation performed.    Risk  OTC drugs.  Prescription drug management.  Drug therapy requiring intensive monitoring for toxicity.  Decision regarding hospitalization.    Critical Care  Total time providing critical care: 33 minutes (Please note that 33 minutes was spent with care and stabilization of this patient for critical care time.  This is exclusive of any time spent on any procedures performed)        Final diagnoses:   Sepsis, due to unspecified organism, unspecified whether acute organ dysfunction present   Cellulitis of left foot   Diabetic ulcer of toe of right foot associated with type 2 diabetes mellitus, with fat layer exposed   Diabetic ulcer of toe of left foot associated with type 2 diabetes mellitus, with fat layer exposed   Uncontrolled type 2 diabetes mellitus with hyperglycemia   Osteomyelitis of great toe of left foot       ED Disposition  ED Disposition       ED Disposition   Decision to Admit    Condition   --    Comment   Level of Care: Med/Surg [1]   Diagnosis: Cellulitis of left lower extremity [[id number]]   Admitting Physician: KENIA DAVILA [[id number]]   Attending Physician: KENIA DAVILA [[id number]]   Certification: I Certify That Inpatient Hospital Services Are Medically Necessary For Greater Than 2 Midnights                 No follow-up provider specified.       Medication List      No changes were made to your prescriptions during this visit.            Jonathan Jean MD  01/13/25 1342       Jonathan Jean MD  01/13/25 1342       Jonathan Jean MD  01/13/25 [id number]

## 2025-01-13 NOTE — Clinical Note
Level of Care: Telemetry [5]   Diagnosis: Sepsis, unspecified organism [[id number]]   Admitting Physician: KENIA DAVILA [[id number]]   Attending Physician: KENIA DAVILA [[id number]]   Isolate for COVID?: No [0]   Certification: I Certify That Inpatient Hospital Services Are Medically Necessary For Greater Than 2 Midnights

## 2025-01-13 NOTE — ED NOTES
Pt presents with visibly infected wound to L great toe and potentially infected R great toe where part of the toe nail was removed. Pt states he just noticed it yesterday. T2DM, has some numbness in his feet, pulses normal BL

## 2025-01-13 NOTE — H&P
"[facility] Medicine Services  History & Physical    Patient Name: Tha Barron  : 1955  MRN: [id number]  Primary Care Physician:  Provider, No Known  Date of admission: 2025  Date and Time of Service: 2025 at 1435    Subjective      Chief Complaint: Left foot pain and redness    History of Present Illness: Tha Barron is a 69 y.o. male with a CMH of type 2 diabetes mellitus, HLD, COPD, myasthenia gravis, GERD, depression who presented to [facility] on 2025 with worsening pain and redness of left foot.  He also endorses right great toe pain as well.  He reports that he was gifted cheese Joanne that he believes were \"too small \"but states was wearing them anyway. Denies nausea, vomiting, fever, chills.  Patient reports that he is compliant with insulin but takes glucometer has since broken and has not been able to afford replacement.    On ED evaluation, patient was noted to be hemodynamically stable and afebrile.  CMP was with mildly elevated AST/ALT but otherwise unremarkable.  CBC with mild leukocytosis with WBC of 10.8.  ESR was 31.  PCT 0.05 with a lactic acid of 2.6.  Despite patient receiving sepsis treatment/IVF's, patient did not meet sepsis criteria according to vitals and laboratory values.  X-ray of left foot revealed relative nondisplaced Meted fracture involving distal phalanx of the left first toe with likely underlying/superimposed osteomyelitis.  Patient started on Unasyn and vancomycin and also given Tdap.  Hospitalist service to admit for further evaluation and management.      Review of Systems   Constitutional:  Negative for chills, fatigue and fever.   Respiratory:  Negative for shortness of breath.    Cardiovascular:  Negative for chest pain.   Gastrointestinal:  Negative for abdominal pain, nausea and vomiting.   Musculoskeletal:         Bilateral foot pain  Wound to both great toes   Skin:         Redness and swelling of left " great toe  Redness and swelling of left leg   All other systems reviewed and are negative.        Personal History     Past Medical History:   Diagnosis Date    COPD (chronic obstructive pulmonary disease)     Diabetes mellitus     Myasthenia gravis     Myasthenia gravis     Tardive dyskinesia     Tardive dyskinesia        Past Surgical History:   Procedure Laterality Date    CHOLECYSTECTOMY N/A 2/15/2023    Procedure: CHOLECYSTECTOMY LAPAROSCOPIC WITH DAVINCI ROBOT;  Surgeon: Tha Franklin MD;  Location: [facility] MAIN OR;  Service: Robotics - DaVinci;  Laterality: N/A;       Family History: family history is not on file. Otherwise pertinent FHx was reviewed and not pertinent to current issue.    Social History:  reports that he has been smoking cigarettes. He has a 40 pack-year smoking history. He has never used smokeless tobacco. He reports that he does not drink alcohol and does not use drugs.    Home Medications:  Prior to Admission Medications       Prescriptions Last Dose Informant Patient Reported? Taking?    Aspirin Low Dose 81 MG EC tablet   Yes No    Take 1 tablet by mouth Daily.    atorvastatin (LIPITOR) 20 MG tablet   Yes No    Take 1 tablet by mouth Every Night.    buPROPion XL (WELLBUTRIN XL) 300 MG 24 hr tablet   Yes No    Take 1 tablet by mouth Every Morning.    Farxiga 10 MG tablet   Yes No    Take 10 mg by mouth Daily.    insulin glargine (LANTUS, SEMGLEE) 100 UNIT/ML injection   Yes No    Inject 10 Units under the skin into the appropriate area as directed Daily.    meloxicam (MOBIC) 15 MG tablet   Yes No    Take 1 tablet by mouth Daily.    metFORMIN (GLUCOPHAGE) 1000 MG tablet   Yes No    Take 1 tablet by mouth 2 (Two) Times a Day.    omeprazole (priLOSEC) 40 MG capsule   Yes No    Take 1 capsule by mouth Daily.    predniSONE (DELTASONE) 20 MG tablet   Yes No    Take 1 tablet by mouth Daily.    pyridostigmine (MESTINON) 180 MG CR tablet   Yes No    Take 1 tablet by mouth 2 (Two) Times a  Day.    sertraline (ZOLOFT) 100 MG tablet   Yes No    Take 1 tablet by mouth Every Morning.    traZODone (DESYREL) 100 MG tablet   Yes No    Take 2 tablets by mouth Every Night.    Valbenazine Tosylate 80 MG capsule   Yes No    Take 80 mg by mouth Every Morning.    zolpidem (AMBIEN) 10 MG tablet   Yes No    Take 1 tablet by mouth Every Night.              Allergies:  No Known Allergies    Objective      Vitals:   Temp:  [98.9 °F (37.2 °C)] 98.9 °F (37.2 °C)  Heart Rate:  [69-83] 69  Resp:  [18] 18  BP: (102-122)/(64-91) 122/64  Body mass index is 32.5 kg/m².    Physical Exam  General: 70 yo WM, Alert and oriented, well nourished, no acute distress.  HENT: Normocephalic, normal hearing, moist oral mucosa, no scleral icterus.  Neck: Supple, nontender, no carotid bruits, no JVD, no LAD.  Lungs: Clear to auscultation, nonlabored respiration.  Heart: RRR, no murmur, gallop or edema.  Abdomen: Soft, nontender, nondistended, + bowel sounds.  Musculoskeletal: Normal range of motion and strength, no tenderness or swelling.  Skin: Wound noted to dorsal aspect of left great toe with surrounding and erythema extending up left lower extremity.  Wound noted to dorsal aspect of great great toe with no surrounding erythema or edema.  Skin is warm, dry and pink.  Psychiatric: Cooperative, appropriate mood and affect.     Diagnostic Data:  Lab Results (last 24 hours)       Procedure Component Value Units Date/Time    STAT Lactic Acid, Reflex [[id number]]  (Abnormal) Collected: 01/13/25 1602    Specimen: Blood Updated: 01/13/25 1639     Lactate 3.2 mmol/L     MRSA Screen, PCR (Inpatient) - Swab, Nares [[id number]] Collected: 01/13/25 1519    Specimen: Swab from Nares Updated: 01/13/25 1524    C-reactive Protein [[id number]]  (Abnormal) Collected: 01/13/25 1242    Specimen: Blood Updated: 01/13/25 1523     C-Reactive Protein 0.54 mg/dL     Procalcitonin [[id number]]  (Normal) Collected: 01/13/25 1242    Specimen: Blood Updated: 01/13/25  "1438     Procalcitonin 0.05 ng/mL     Narrative:      As a Marker for Sepsis (Non-Neonates):    1. <0.5 ng/mL represents a low risk of severe sepsis and/or septic shock.  2. >2 ng/mL represents a high risk of severe sepsis and/or septic shock.    As a Marker for Lower Respiratory Tract Infections that require antibiotic therapy:    PCT on Admission    Antibiotic Therapy       6-12 Hrs later    >0.5                Strongly Recommended  >0.25 - <0.5        Recommended   0.1 - 0.25          Discouraged              Remeasure/reassess PCT  <0.1                Strongly Discouraged     Remeasure/reassess PCT    As 28 day mortality risk marker: \"Change in Procalcitonin Result\" (>80% or <=80%) if Day 0 (or Day 1) and Day 4 values are available. Refer to http://www.[facility]-pct-calculator.com    Change in PCT <=80%  A decrease of PCT levels below or equal to 80% defines a positive change in PCT test result representing a higher risk for 28-day all-cause mortality of patients diagnosed with severe sepsis for septic shock.    Change in PCT >80%  A decrease of PCT levels of more than 80% defines a negative change in PCT result representing a lower risk for 28-day all-cause mortality of patients diagnosed with severe sepsis or septic shock.       Blood Culture - Blood, Hand, Left [[id number]] Collected: 01/13/25 1319    Specimen: Blood from Hand, Left Updated: 01/13/25 1323    Comprehensive Metabolic Panel [[id number]]  (Abnormal) Collected: 01/13/25 1242    Specimen: Blood Updated: 01/13/25 1316     Glucose 115 mg/dL      BUN 14 mg/dL      Creatinine 1.03 mg/dL      Sodium 139 mmol/L      Potassium 4.4 mmol/L      Chloride 102 mmol/L      CO2 22.6 mmol/L      Calcium 9.2 mg/dL      Total Protein 6.7 g/dL      Albumin 3.7 g/dL      ALT (SGPT) 42 U/L      AST (SGOT) 47 U/L      Alkaline Phosphatase 54 U/L      Total Bilirubin 0.2 mg/dL      Globulin 3.0 gm/dL      A/G Ratio 1.2 g/dL      BUN/Creatinine Ratio 13.6     Anion Gap 14.4 " mmol/L      eGFR 78.1 mL/min/1.73     Narrative:      GFR Categories in Chronic Kidney Disease (CKD)      GFR Category          GFR (mL/min/1.73)    Interpretation  G1                     90 or greater         Normal or high (1)  G2                      60-89                Mild decrease (1)  G3a                   45-59                Mild to moderate decrease  G3b                   30-44                Moderate to severe decrease  G4                    15-29                Severe decrease  G5                    14 or less           Kidney failure          (1)In the absence of evidence of kidney disease, neither GFR category G1 or G2 fulfill the criteria for CKD.    eGFR calculation 2021 CKD-EPI creatinine equation, which does not include race as a factor    Urinalysis With Culture If Indicated - Urine, Clean Catch [[id number]]  (Abnormal) Collected: 01/13/25 1251    Specimen: Urine, Clean Catch Updated: 01/13/25 1259     Color, UA Yellow     Appearance, UA Clear     pH, UA <=5.0     Specific Gravity, UA 1.015     Glucose, UA >=1000 mg/dL (3+)     Ketones, UA Negative     Bilirubin, UA Negative     Blood, UA Negative     Protein, UA Negative     Leuk Esterase, UA Negative     Nitrite, UA Negative     Urobilinogen, UA 0.2 E.U./dL    Narrative:      In absence of clinical symptoms, the presence of pyuria, bacteria, and/or nitrites on the urinalysis result does not correlate with infection.  Urine microscopic not indicated.    Sedimentation Rate [[id number]]  (Abnormal) Collected: 01/13/25 1242    Specimen: Blood Updated: 01/13/25 1256     Sed Rate 31 mm/hr     CBC & Differential [[id number]]  (Abnormal) Collected: 01/13/25 1242    Specimen: Blood Updated: 01/13/25 1250    Narrative:      The following orders were created for panel order CBC & Differential.  Procedure                               Abnormality         Status                     ---------                               -----------         ------                      CBC Auto Differential[[id number]]        Abnormal            Final result                 Please view results for these tests on the individual orders.    CBC Auto Differential [[id number]]  (Abnormal) Collected: 01/13/25 1242    Specimen: Blood Updated: 01/13/25 1250     WBC 10.85 10*3/mm3      RBC 4.30 10*6/mm3      Hemoglobin 13.2 g/dL      Hematocrit 41.0 %      MCV 95.3 fL      MCH 30.7 pg      MCHC 32.2 g/dL      RDW 13.2 %      RDW-SD 46.3 fl      MPV 10.2 fL      Platelets 269 10*3/mm3      Neutrophil % 87.4 %      Lymphocyte % 8.2 %      Monocyte % 3.1 %      Eosinophil % 0.6 %      Basophil % 0.2 %      Immature Grans % 0.5 %      Neutrophils, Absolute 9.49 10*3/mm3      Lymphocytes, Absolute 0.89 10*3/mm3      Monocytes, Absolute 0.34 10*3/mm3      Eosinophils, Absolute 0.06 10*3/mm3      Basophils, Absolute 0.02 10*3/mm3      Immature Grans, Absolute 0.05 10*3/mm3      nRBC 0.0 /100 WBC     Blood Culture - Blood, Arm, Right [[id number]] Collected: 01/13/25 1242    Specimen: Blood from Arm, Right Updated: 01/13/25 1248    POC Lactate [[id number]]  (Abnormal) Collected: 01/13/25 1247    Specimen: Blood Updated: 01/13/25 1248     Lactate 2.6 mmol/L      Comment: Serial Number: [id number]:  [id number]                Imaging Results (Last 24 Hours)       Procedure Component Value Units Date/Time    XR Foot 2 View Bilateral [[id number]] Collected: 01/13/25 1353     Updated: 01/13/25 1357    Narrative:      XR FOOT 2 VW BILATERAL    Date of Exam: 1/13/2025 1:36 PM EST    Indication: diabeteic toe    Comparison: None available.    Findings:  Comminuted fracture involving the mid to distal aspect of the distal phalanx of the left first toe. Additionally, there is generalized osteopenia involving the distal phalanx of the left first toe with vague areas of cortical loss suggesting the   potential for underlying/superimposed osteomyelitis. Interossea structures appear intact. Alignment is normal. Joint  space is adequately maintained. Soft tissues are unremarkable.      Impression:      Impression:  1.Relative nondisplaced comminuted fracture involving the distal phalanx of the left first toe with likely underlying/superimposed osteomyelitis. Correlate with symptoms and history.      Electronically Signed: Marko Dimas MD    1/13/2025 1:55 PM EST    Workstation ID: [id number]              Assessment & Plan        This is a 69 y.o. male with:    Active and Resolved Problems  Active Hospital Problems    Diagnosis  POA    Cellulitis of left lower extremity [L03.116]  Yes      Resolved Hospital Problems   No resolved problems to display.     Diabetic foot ulcer, bilateral feet  Cellulitis of left lower extremity with concern for osteomyelitis of left foot  History of diabetes mellitus  MRI of left foot w/wo pending  XR of left foot with relative nondisplaced commuted fracture involving distal phalanx of the left first toe with likely underlying/superimposed osteomyelitis  WBC 10.8, LA 2.6, HDS, AF, does not meet sepsis criteria  ESR 31, CRP pending  Blood cultures pending  Continue Unasyn and vancomycin  SSI, continue Lantus, consistent carb diet  As needed analgesics  Podiatry consulted  CM consulted for help with glucometer replacement    COPD  Not in acute exacerbation  Continue home inhalers     Myasthenia gravis  Tardive dyskinesia  Continue prednisone 20 mg daily, pyridostigmine  Also on Valbenazine, not on formulary     Hyperlipidemia-continue home ASA, statin  Depression-continue Wellbutrin  GERD-continue PPI      VTE Prophylaxis:  Mechanical VTE prophylaxis orders are present.        The patient desires to be as follows:    CODE STATUS:    Code Status (Patient has no pulse and is not breathing): CPR (Attempt to Resuscitate)  Medical Interventions (Patient has pulse or is breathing): Full Support      Kinjal paris, who can be contacted at [phone], is the designated person to make medical decisions on the  patient's behalf if He is incapable of doing so. This was clarified with patient and/or next of kin on 1/13/2025 during the course of this H&P.    Admission Status:  I believe this patient meets inpatient status.    Expected Length of Stay: 2-3 days    PDMP and Medication Dispenses via Sidebar reviewed and consistent with patient reported medications.    I discussed the patient's findings and my recommendations with patient.      Signature:     This document has been electronically signed by Calvin Church PA-C on January 13, 2025 16:39 [facility] Hospitalist Team

## 2025-01-14 ENCOUNTER — APPOINTMENT (OUTPATIENT)
Dept: CARDIOLOGY | Facility: HOSPITAL | Age: 70
End: 2025-01-14
Payer: MEDICAID

## 2025-01-14 ENCOUNTER — APPOINTMENT (OUTPATIENT)
Dept: MRI IMAGING | Facility: HOSPITAL | Age: 70
End: 2025-01-14
Payer: MEDICAID

## 2025-01-14 LAB
ANION GAP SERPL CALCULATED.3IONS-SCNC: 8.2 MMOL/L (ref 5–15)
BASOPHILS # BLD AUTO: 0.03 10*3/MM3 (ref 0–0.2)
BASOPHILS NFR BLD AUTO: 0.4 % (ref 0–1.5)
BH CV LOWER ARTERIAL LEFT ABI RATIO: 1.3
BH CV LOWER ARTERIAL LEFT DORSALIS PEDIS SYS MAX: 121
BH CV LOWER ARTERIAL LEFT GREAT TOE SYS MAX: 185
BH CV LOWER ARTERIAL LEFT POST TIBIAL SYS MAX: 143
BH CV LOWER ARTERIAL LEFT TBI RATIO: 1.68
BH CV LOWER ARTERIAL RIGHT ABI RATIO: 1.18
BH CV LOWER ARTERIAL RIGHT DORSALIS PEDIS SYS MAX: 122
BH CV LOWER ARTERIAL RIGHT GREAT TOE SYS MAX: 101
BH CV LOWER ARTERIAL RIGHT POST TIBIAL SYS MAX: 130
BH CV LOWER ARTERIAL RIGHT TBI RATIO: 0.92
BUN SERPL-MCNC: 13 MG/DL (ref 8–23)
BUN/CREAT SERPL: 13.5 (ref 7–25)
CALCIUM SPEC-SCNC: 9.3 MG/DL (ref 8.6–10.5)
CHLORIDE SERPL-SCNC: 105 MMOL/L (ref 98–107)
CO2 SERPL-SCNC: 26.8 MMOL/L (ref 22–29)
CREAT SERPL-MCNC: 0.96 MG/DL (ref 0.76–1.27)
DEPRECATED RDW RBC AUTO: 46.4 FL (ref 37–54)
EGFRCR SERPLBLD CKD-EPI 2021: 85 ML/MIN/1.73
EOSINOPHIL # BLD AUTO: 0.1 10*3/MM3 (ref 0–0.4)
EOSINOPHIL NFR BLD AUTO: 1.2 % (ref 0.3–6.2)
ERYTHROCYTE [DISTWIDTH] IN BLOOD BY AUTOMATED COUNT: 13.2 % (ref 12.3–15.4)
FLUAV RNA RESP QL NAA+PROBE: NOT DETECTED
FLUBV RNA RESP QL NAA+PROBE: NOT DETECTED
GLUCOSE BLDC GLUCOMTR-MCNC: 105 MG/DL (ref 70–105)
GLUCOSE SERPL-MCNC: 89 MG/DL (ref 65–99)
HCT VFR BLD AUTO: 41.4 % (ref 37.5–51)
HGB BLD-MCNC: 12.7 G/DL (ref 13–17.7)
IMM GRANULOCYTES # BLD AUTO: 0.06 10*3/MM3 (ref 0–0.05)
IMM GRANULOCYTES NFR BLD AUTO: 0.7 % (ref 0–0.5)
LYMPHOCYTES # BLD AUTO: 1.98 10*3/MM3 (ref 0.7–3.1)
LYMPHOCYTES NFR BLD AUTO: 23.9 % (ref 19.6–45.3)
MAGNESIUM SERPL-MCNC: 1.9 MG/DL (ref 1.6–2.4)
MCH RBC QN AUTO: 29.3 PG (ref 26.6–33)
MCHC RBC AUTO-ENTMCNC: 30.7 G/DL (ref 31.5–35.7)
MCV RBC AUTO: 95.4 FL (ref 79–97)
MONOCYTES # BLD AUTO: 0.55 10*3/MM3 (ref 0.1–0.9)
MONOCYTES NFR BLD AUTO: 6.6 % (ref 5–12)
NEUTROPHILS NFR BLD AUTO: 5.57 10*3/MM3 (ref 1.7–7)
NEUTROPHILS NFR BLD AUTO: 67.2 % (ref 42.7–76)
NRBC BLD AUTO-RTO: 0 /100 WBC (ref 0–0.2)
PLATELET # BLD AUTO: 267 10*3/MM3 (ref 140–450)
PMV BLD AUTO: 10.3 FL (ref 6–12)
POTASSIUM SERPL-SCNC: 4.4 MMOL/L (ref 3.5–5.2)
RBC # BLD AUTO: 4.34 10*6/MM3 (ref 4.14–5.8)
RSV RNA RESP QL NAA+PROBE: NOT DETECTED
SARS-COV-2 RNA RESP QL NAA+PROBE: NOT DETECTED
SODIUM SERPL-SCNC: 140 MMOL/L (ref 136–145)
UPPER ARTERIAL LEFT ARM BRACHIAL SYS MAX: 110
UPPER ARTERIAL RIGHT ARM BRACHIAL SYS MAX: 103
WBC NRBC COR # BLD AUTO: 8.29 10*3/MM3 (ref 3.4–10.8)

## 2025-01-14 PROCEDURE — 94761 N-INVAS EAR/PLS OXIMETRY MLT: CPT

## 2025-01-14 PROCEDURE — 63710000001 PREDNISONE PER 1 MG

## 2025-01-14 PROCEDURE — A9579 GAD-BASE MR CONTRAST NOS,1ML: HCPCS | Performed by: STUDENT IN AN ORGANIZED HEALTH CARE EDUCATION/TRAINING PROGRAM

## 2025-01-14 PROCEDURE — 25010000002 GADOTERIDOL PER 1 ML: Performed by: STUDENT IN AN ORGANIZED HEALTH CARE EDUCATION/TRAINING PROGRAM

## 2025-01-14 PROCEDURE — 87637 SARSCOV2&INF A&B&RSV AMP PRB: CPT | Performed by: HOSPITALIST

## 2025-01-14 PROCEDURE — 94799 UNLISTED PULMONARY SVC/PX: CPT

## 2025-01-14 PROCEDURE — 87070 CULTURE OTHR SPECIMN AEROBIC: CPT | Performed by: HOSPITALIST

## 2025-01-14 PROCEDURE — 73720 MRI LWR EXTREMITY W/O&W/DYE: CPT

## 2025-01-14 PROCEDURE — 94664 DEMO&/EVAL PT USE INHALER: CPT

## 2025-01-14 PROCEDURE — 63710000001 INSULIN GLARGINE PER 5 UNITS

## 2025-01-14 PROCEDURE — 25010000002 HYDROMORPHONE 1 MG/ML SOLUTION: Performed by: HOSPITALIST

## 2025-01-14 PROCEDURE — 99231 SBSQ HOSP IP/OBS SF/LOW 25: CPT | Performed by: PODIATRIST

## 2025-01-14 PROCEDURE — 80048 BASIC METABOLIC PNL TOTAL CA: CPT

## 2025-01-14 PROCEDURE — 87205 SMEAR GRAM STAIN: CPT | Performed by: HOSPITALIST

## 2025-01-14 PROCEDURE — 93922 UPR/L XTREMITY ART 2 LEVELS: CPT | Performed by: SURGERY

## 2025-01-14 PROCEDURE — 85025 COMPLETE CBC W/AUTO DIFF WBC: CPT

## 2025-01-14 PROCEDURE — 83735 ASSAY OF MAGNESIUM: CPT

## 2025-01-14 PROCEDURE — 82948 REAGENT STRIP/BLOOD GLUCOSE: CPT

## 2025-01-14 PROCEDURE — 93922 UPR/L XTREMITY ART 2 LEVELS: CPT

## 2025-01-14 RX ADMIN — ASPIRIN 81 MG: 81 TABLET, COATED ORAL at 09:41

## 2025-01-14 RX ADMIN — ZOLPIDEM TARTRATE 10 MG: 5 TABLET ORAL at 21:12

## 2025-01-14 RX ADMIN — BUDESONIDE AND FORMOTEROL FUMARATE DIHYDRATE 2 PUFF: 160; 4.5 AEROSOL RESPIRATORY (INHALATION) at 20:41

## 2025-01-14 RX ADMIN — BUDESONIDE AND FORMOTEROL FUMARATE DIHYDRATE 2 PUFF: 160; 4.5 AEROSOL RESPIRATORY (INHALATION) at 09:54

## 2025-01-14 RX ADMIN — LISINOPRIL 40 MG: 20 TABLET ORAL at 09:41

## 2025-01-14 RX ADMIN — PYRIDOSTIGMINE BROMIDE 180 MG: 60 TABLET ORAL at 09:56

## 2025-01-14 RX ADMIN — PYRIDOSTIGMINE BROMIDE 180 MG: 60 TABLET ORAL at 21:12

## 2025-01-14 RX ADMIN — PANTOPRAZOLE SODIUM 40 MG: 40 TABLET, DELAYED RELEASE ORAL at 09:41

## 2025-01-14 RX ADMIN — OXYCODONE 5 MG: 5 TABLET ORAL at 09:50

## 2025-01-14 RX ADMIN — ATORVASTATIN CALCIUM 20 MG: 20 TABLET, FILM COATED ORAL at 21:12

## 2025-01-14 RX ADMIN — BUPROPION HYDROCHLORIDE 300 MG: 150 TABLET, EXTENDED RELEASE ORAL at 10:25

## 2025-01-14 RX ADMIN — INSULIN GLARGINE 10 UNITS: 100 INJECTION, SOLUTION SUBCUTANEOUS at 09:41

## 2025-01-14 RX ADMIN — PREDNISONE 20 MG: 20 TABLET ORAL at 09:41

## 2025-01-14 RX ADMIN — TRAZODONE HYDROCHLORIDE 200 MG: 100 TABLET ORAL at 21:12

## 2025-01-14 RX ADMIN — GADOTERIDOL 20 ML: 279.3 INJECTION, SOLUTION INTRAVENOUS at 02:02

## 2025-01-14 RX ADMIN — HYDROMORPHONE HYDROCHLORIDE 0.5 MG: 1 INJECTION, SOLUTION INTRAMUSCULAR; INTRAVENOUS; SUBCUTANEOUS at 14:46

## 2025-01-14 NOTE — CASE MANAGEMENT/SOCIAL WORK
Discharge Planning Assessment   Ran     Patient Name: Tha Barron  MRN: [id number]  Today's Date: 1/14/2025    Admit Date: 1/13/2025    Plan: Return to group home ([facility])   Discharge Needs Assessment       Row Name 01/14/25 1012       Living Environment    People in Home other (see comments)  Lives in group home    Current Living Arrangements home    Potentially Unsafe Housing Conditions none    In the past 12 months has the electric, gas, oil, or water company threatened to shut off services in your home? No    Primary Care Provided by self    Provides Primary Care For no one    Family Caregiver if Needed other (see comments)  Group home    Quality of Family Relationships unable to assess    Able to Return to Prior Arrangements yes       Resource/Environmental Concerns    Resource/Environmental Concerns none    Transportation Concerns other (see comments)  Transportation provided from owner of group home       Transportation Needs    In the past 12 months, has lack of transportation kept you from medical appointments or from getting medications? no    In the past 12 months, has lack of transportation kept you from meetings, work, or from getting things needed for daily living? No       Food Insecurity    Within the past 12 months, you worried that your food would run out before you got the money to buy more. Never true    Within the past 12 months, the food you bought just didn't last and you didn't have money to get more. Never true       Transition Planning    Patient/Family Anticipates Transition to other (see comments)  Group home    Patient/Family Anticipated Services at Transition none    Transportation Anticipated family or friend will provide       Discharge Needs Assessment    Readmission Within the Last 30 Days no previous admission in last 30 days    Current Outpatient/Agency/Support Group group home    Equipment Currently Used at Home none    Concerns to be Addressed denies  needs/concerns at this time    Do you want help finding or keeping work or a job? I do not need or want help    Anticipated Changes Related to Illness none    Equipment Needed After Discharge glucometer    Outpatient/Agency/Support Group Needs group home                   Discharge Plan       Row Name 01/14/25 1014       Plan    Plan Return to group home ([facility])    Patient/Family in Agreement with Plan yes    Plan Comments CM met with patient at bedside. Confirmed PCP, insurance, and pharmacy.  Meds to beds enrolled. Patient denies any difficulty affording medications. Patient not current with any therapies as he lives at a group home [facility]. Confirmed transportation at discharge will be Jesus Manuel Méndez the owner of the group home who transports him to [facility] on Tuesdays and Thursdays. Patient states he received a glucometer in the last 5 years and insurance only covers 1 per 5 years. DC Barriers: Podiatry following, MRI results pending, IV abx x2, 2L O2.                  Continued Care and Services - Admitted Since 1/13/2025    No active coordination exists for this encounter.          Demographic Summary       Row Name 01/14/25 1010       General Information    Admission Type inpatient    Arrived From emergency department    Referral Source admission list    Reason for Consult discharge planning    Preferred Language English       Contact Information    Permission Granted to Share Info With                    Functional Status       Row Name 01/14/25 1011       Functional Status    Usual Activity Tolerance good    Current Activity Tolerance good       Physical Activity    On average, how many days per week do you engage in moderate to strenuous exercise (like a brisk walk)? 0 days    On average, how many minutes do you engage in exercise at this level? 0 min    Number of minutes of exercise per week 0       Functional Status, IADL    Medications independent    Meal  Preparation independent    Housekeeping independent    Laundry independent    Shopping assistive person    IADL Comments Lives in group home               Mercy Mijares RN     Office: [phone]

## 2025-01-14 NOTE — SIGNIFICANT NOTE
01/14/25 1018   Living Situation   Current Living Arrangements group home   Potentially Unsafe Housing Conditions none   Food Insecurity   Within the past 12 months, you worried that your food would run out before you got the money to buy more. Never true   Within the past 12 months, the food you bought just didn't last and you didn't have money to get more. Never true   Transportation Needs   In the past 12 months, has lack of transportation kept you from medical appointments or from getting medications? no   In the past 12 months, has lack of transportation kept you from meetings, work, or from getting things needed for daily living? No   Utilities   In the past 12 months has the electric, gas, oil, or water company threatened to shut off services in your home? No   Abuse Screen (yes response referral indicated)   Feels Unsafe at Home or Work/School no   Feels Threatened by Someone no   Does Anyone Try to Keep You From Having Contact with Others or Doing Things Outside Your Home? no   Physical Signs of Abuse Present no   Financial Resource Strain   How hard is it for you to pay for the very basics like food, housing, medical care, and heating? Not hard   Family and Community Support   If for any reason you need help with day-to-day activities such as bathing, preparing meals, shopping, managing finances, etc., do you get the help you need? I don't need any help   How often do you feel lonely or isolated from those around you? Never   Education   Preferred Language English   Do you want help with school or training? For example, starting or completing job training or getting a high school diploma, GED or equivalent No   Physical Activity   On average, how many days per week do you engage in moderate to strenuous exercise (like a brisk walk)? 0 days   On average, how many minutes do you engage in exercise at this level? 0 min   Number of minutes of exercise per week (!) 0   Alcohol Use   Q1: How often do you have  a drink containing alcohol? Never   Q2: How many drinks containing alcohol do you have on a typical day when you are drinking? None   Q3: How often do you have six or more drinks on one occasion? Never   Stress   Do you feel stress - tense, restless, nervous, or anxious, or unable to sleep at night because your mind is troubled all the time - these days? Not at all   Mental Health   Why did the patient not complete the Depression Screen questions? Patient declined   Disabilities   Difficulty Concentrating, Remembering or Making Decisions no   Difficulty Managing Errands Independently no

## 2025-01-14 NOTE — PROGRESS NOTES
[facility] MEDICINE SERVICE  DAILY PROGRESS NOTE    NAME: Tha Barron  : 1955  MRN: [id number]      LOS: 1 day     PROVIDER OF SERVICE: Timothy Duane Brammell, MD    Chief Complaint: <principal problem not specified>    Subjective:     Interval History:  History taken from: patient    Patient without acute issues other than his left toe pain.  Pain improved with medication.  Denies any other additional acute issues.        Review of Systems:   Review of Systems   All other systems reviewed and are negative.      Objective:     Vital Signs  Temp:  [97.7 °F (36.5 °C)-98.7 °F (37.1 °C)] 97.7 °F (36.5 °C)  Heart Rate:  [52-68] 60  Resp:  [15-18] 16  BP: ()/(43-66) 105/53  Flow (L/min) (Oxygen Therapy):  [2] 2   Body mass index is 32.5 kg/m².    Physical Exam  Physical Exam  Vitals reviewed.   Constitutional:       General: He is not in acute distress.     Appearance: Normal appearance.   Cardiovascular:      Rate and Rhythm: Normal rate and regular rhythm.   Pulmonary:      Effort: Pulmonary effort is normal.      Breath sounds: Normal breath sounds.   Abdominal:      General: Bowel sounds are normal.      Palpations: Abdomen is soft.   Musculoskeletal:      Comments: Left great toe with noted skin disruption as well as edema and mucoserous drainage with generalized edema/erythema.   Neurological:      Mental Status: He is alert.            Diagnostic Data    Results from last 7 days   Lab Units 25  0611 25  1242   WBC 10*3/mm3 8.29 10.85*   HEMOGLOBIN g/dL 12.7* 13.2   HEMATOCRIT % 41.4 41.0   PLATELETS 10*3/mm3 267 269   GLUCOSE mg/dL 89 115*   CREATININE mg/dL 0.96 1.03   BUN mg/dL 13 14   SODIUM mmol/L 140 139   POTASSIUM mmol/L 4.4 4.4   AST (SGOT) U/L  --  47*   ALT (SGPT) U/L  --  42*   ALK PHOS U/L  --  54   BILIRUBIN mg/dL  --  0.2   ANION GAP mmol/L 8.2 14.4       MRI Foot Left With & Without Contrast    Result Date: 2025  Impression: There is a comminuted  fracture distal phalanx great toe with abnormal marrow signal intensity. It is uncertain whether this is due to a pathologic fracture or posttraumatic fracture. Correlate with history and symptoms. No drainable fluid collection suggest abscess. Questionable ulceration at the distal tip of the great toe. Small joint effusion of the great toe interphalangeal joint. Mild arthritis first and fifth metatarsal phalangeal joint. Electronically Signed: Lana Nguyen MD  1/14/2025 8:41 AM EST  Workstation ID: [id number]    XR Foot 2 View Bilateral    Result Date: 1/13/2025  Impression: 1.Relative nondisplaced comminuted fracture involving the distal phalanx of the left first toe with likely underlying/superimposed osteomyelitis. Correlate with symptoms and history. Electronically Signed: Marko Dimas MD  1/13/2025 1:55 PM EST  Workstation ID: [id number]           Assessment:    Great toe cellulitis.  Type 2 diabetes.  Myasthenia gravis  COPD  Chronic steroid use.       Plan.  Symptomatic pain control.  Podiatry consult pending.  Infectious disease to review.      Active and Resolved Problems  Active Hospital Problems    Diagnosis  POA    Cellulitis of left lower extremity [L03.116]  Yes      Resolved Hospital Problems   No resolved problems to display.           VTE Prophylaxis:  Mechanical VTE prophylaxis orders are present.             Disposition Planning:     Barriers to Discharge:toe improvement  Anticipated Date of Discharge: 1/17  Place of Discharge: home      Time: 30 minutes     Code Status and Medical Interventions: CPR (Attempt to Resuscitate); Full Support   Ordered at: 01/13/25 1402     Code Status (Patient has no pulse and is not breathing):    CPR (Attempt to Resuscitate)     Medical Interventions (Patient has pulse or is breathing):    Full Support       Signature: Electronically signed by Timothy Duane Brammell, MD, 01/14/25, 16:09 EST.  [facility] Hospitalist Team

## 2025-01-14 NOTE — PLAN OF CARE
Goal Outcome Evaluation:  Plan of Care Reviewed With: patient        Progress: no change  Outcome Evaluation: Patient's pain controlled, patient had MRI, results pending. Additional plans dependent on MRI results. Patient aware. All questions answered to patient satisfaction.

## 2025-01-14 NOTE — CONSULTS
01/14/25   Foot and Ankle Surgery - Consult  Provider: Dr. Dashawn Trevino DPM  Location: [facility]    Subjective:  Tha Barron is a 69 y.o. male.     Chief Complaint   Patient presents with    Wound Check       Consulting Physician: Primary service    Reason for Consult: Bilateral great toe wounds    HPI: Patient is a pleasant 69-year-old diabetic male that has been admitted for concerns involving his left great toe.  Patient received a pair of shoes for [place] and feels that they were too small causing the wounds to the his great toes.  He has noticed significant increase in issues involving his left great toe recently prompting him to report to the ED.  Patient denies any history of open wounds or infections involving the feet prior to these issues.  Patient states that he lives in a group home.  Patient does try to stay compliant with his medications.  He denies fever, chills, nausea, vomiting.    No Known Allergies    Past Medical History:   Diagnosis Date    COPD (chronic obstructive pulmonary disease)     Diabetes mellitus     Myasthenia gravis     Myasthenia gravis     Tardive dyskinesia     Tardive dyskinesia        Past Surgical History:   Procedure Laterality Date    CHOLECYSTECTOMY N/A 2/15/2023    Procedure: CHOLECYSTECTOMY LAPAROSCOPIC WITH DAVINCI ROBOT;  Surgeon: Tha Franklin MD;  Location: [facility] OR;  Service: Robotics - DaVinci;  Laterality: N/A;       History reviewed. No pertinent family history.    Social History     Socioeconomic History    Marital status: Single   Tobacco Use    Smoking status: Every Day     Current packs/day: 1.00     Average packs/day: 1 pack/day for 40.0 years (40.0 ttl pk-yrs)     Types: Cigarettes    Smokeless tobacco: Never   Vaping Use    Vaping status: Never Used    Passive vaping exposure: Yes   Substance and Sexual Activity    Alcohol use: Never    Drug use: Never     Comment: History of meth use 15 years ago    Sexual  activity: Not Currently          Current Facility-Administered Medications:     acetaminophen (TYLENOL) tablet 650 mg, 650 mg, Oral, Q4H PRN **OR** acetaminophen (TYLENOL) 160 MG/5ML oral solution 650 mg, 650 mg, Oral, Q4H PRN **OR** acetaminophen (TYLENOL) suppository 650 mg, 650 mg, Rectal, Q4H PRN, Calvin Church PA-C    aspirin EC tablet 81 mg, 81 mg, Oral, Daily, Calvin Church PA-C    atorvastatin (LIPITOR) tablet 20 mg, 20 mg, Oral, Nightly, Calvin Church PA-C, 20 mg at 01/13/25 2208    sennosides-docusate (PERICOLACE) 8.6-50 MG per tablet 2 tablet, 2 tablet, Oral, BID PRN **AND** polyethylene glycol (MIRALAX) packet 17 g, 17 g, Oral, Daily PRN **AND** bisacodyl (DULCOLAX) EC tablet 5 mg, 5 mg, Oral, Daily PRN **AND** bisacodyl (DULCOLAX) suppository 10 mg, 10 mg, Rectal, Daily PRN, Calvin Church PA-C    budesonide-formoterol (SYMBICORT) 160-4.5 MCG/ACT inhaler 2 puff, 2 puff, Inhalation, BID - RT, Calvin Church PA-C, 2 puff at 01/13/25 1940    buPROPion XL (WELLBUTRIN XL) 24 hr tablet 300 mg, 300 mg, Oral, Daily, Calvin Church PA-C    Calcium Replacement - Follow Nurse / BPA Driven Protocol, , Not Applicable, PRN, Calvin Church PA-C    [Held by provider] empagliflozin (JARDIANCE) tablet 25 mg, 25 mg, Oral, Daily, Calvin Church PA-C    insulin glargine (LANTUS, SEMGLEE) injection 10 Units, 10 Units, Subcutaneous, Daily, Calvin Church PA-C    lisinopril (PRINIVIL,ZESTRIL) tablet 40 mg, 40 mg, Oral, Daily, Calvin Church PA-C    Magnesium Standard Dose Replacement - Follow Nurse / BPA Driven Protocol, , Not Applicable, PRN, Calvin Church PA-C    melatonin tablet 5 mg, 5 mg, Oral, Nightly PRN, Calvin Church PA-C    [Held by provider] meloxicam (MOBIC) tablet 15 mg, 15 mg, Oral, Daily, Calvin Church PA-C    [Held by provider] metFORMIN (GLUCOPHAGE) tablet 1,000 mg, 1,000 mg, Oral, BID, Calvin Church PA-C    ondansetron ODT (ZOFRAN-ODT) disintegrating tablet 4 mg, 4 mg, Oral,  Q6H PRN **OR** ondansetron (ZOFRAN) injection 4 mg, 4 mg, Intravenous, Q6H PRN, Calvin Church PA-C    oxyCODONE (ROXICODONE) immediate release tablet 5 mg, 5 mg, Oral, Q6H PRN, Calvin Church PA-C, 5 mg at 01/13/25 1918    pantoprazole (PROTONIX) EC tablet 40 mg, 40 mg, Oral, QAM AC, Calvin Church PA-C    Phosphorus Replacement - Follow Nurse / BPA Driven Protocol, , Not Applicable, PRN, Calvin Church PA-C    Potassium Replacement - Follow Nurse / BPA Driven Protocol, , Not Applicable, PRN, Calvin Church PA-C    predniSONE (DELTASONE) tablet 20 mg, 20 mg, Oral, Daily, Calvin Church PA-C    pyridostigmine (MESTINON) tablet 180 mg, 180 mg, Oral, Q12H, Calvin Church PA-C, 180 mg at 01/13/25 2207    traZODone (DESYREL) tablet 200 mg, 200 mg, Oral, Nightly, Calvin Church PA-C, 200 mg at 01/13/25 2207    zolpidem (AMBIEN) tablet 10 mg, 10 mg, Oral, Nightly, Calvin Church PA-C, 10 mg at 01/13/25 2208    Current Outpatient Medications:     Aspirin Low Dose 81 MG EC tablet, Take 1 tablet by mouth Daily., Disp: , Rfl:     atorvastatin (LIPITOR) 20 MG tablet, Take 1 tablet by mouth Every Night., Disp: , Rfl:     budesonide-formoterol (SYMBICORT) 160-4.5 MCG/ACT inhaler, Inhale 2 puffs 2 (Two) Times a Day., Disp: , Rfl:     buPROPion XL (WELLBUTRIN XL) 300 MG 24 hr tablet, Take 1 tablet by mouth Every Morning., Disp: , Rfl:     Farxiga 10 MG tablet, Take 10 mg by mouth Daily., Disp: , Rfl:     insulin glargine (LANTUS, SEMGLEE) 100 UNIT/ML injection, Inject 10 Units under the skin into the appropriate area as directed Daily., Disp: , Rfl:     lisinopril (PRINIVIL,ZESTRIL) 40 MG tablet, Take 1 tablet by mouth Daily., Disp: , Rfl:     meloxicam (MOBIC) 15 MG tablet, Take 1 tablet by mouth Daily., Disp: , Rfl:     metFORMIN (GLUCOPHAGE) 1000 MG tablet, Take 1 tablet by mouth 2 (Two) Times a Day., Disp: , Rfl:     omeprazole (priLOSEC) 40 MG capsule, Take 1 capsule by mouth Daily., Disp: , Rfl:      "predniSONE (DELTASONE) 20 MG tablet, Take 1 tablet by mouth Daily., Disp: , Rfl:     pyridostigmine (MESTINON) 180 MG CR tablet, Take 1 tablet by mouth 2 (Two) Times a Day., Disp: , Rfl:     traZODone (DESYREL) 100 MG tablet, Take 2 tablets by mouth Every Night., Disp: , Rfl:     Valbenazine Tosylate 80 MG capsule, Take 80 mg by mouth Every Morning., Disp: , Rfl:     varenicline (CHANTIX) 0.5 MG tablet, Take 1 tablet by mouth 2 (Two) Times a Day., Disp: , Rfl:     zolpidem (AMBIEN) 10 MG tablet, Take 1 tablet by mouth Every Night., Disp: , Rfl:     Review of Systems:  General: Denies fever, chills, fatigue, and weakness.  Eyes: Denies vision loss, blurry vision, and excessive redness.  ENT: Denies hearing issues and difficulty swallowing.  Cardiovascular: Denies palpitations, chest pain, or syncopal episodes.  Respiratory: Denies shortness of breath, wheezing, and coughing.  GI: Denies abdominal pain, nausea, and vomiting.   : Denies frequency, hematuria, and urgency.  Musculoskeletal: Denies muscle cramps, joint pains, and stiffness.  Derm: + Bilateral great toe wounds  Neuro: Denies headaches, numbness, loss of coordination, and tremors.  Psych: Denies anxiety and depression.  Endocrine: Denies temperature intolerance and changes in appetite.  Heme: Denies bleeding disorders or abnormal bruising.     Objective   BP (!) 87/43 (BP Location: Left arm, Patient Position: Lying)   Pulse 52   Temp 97.8 °F (36.6 °C) (Axillary)   Resp 15   Ht 190.5 cm (75\")   Wt 118 kg (260 lb)   SpO2 95%   BMI 32.50 kg/m²     Foot/Ankle Exam    GENERAL  Appearance:  disheveled and elderly  Orientation:  AAOx3  Affect:  appropriate    VASCULAR     Right Foot Vascularity   Dorsalis pedis:  Doppler  Posterior tibial:  Doppler  Skin temperature:  cool  Edema grading:  None  CFT:  4  Pedal hair growth:  Absent     Left Foot Vascularity   Dorsalis pedis:  2+  Posterior tibial:  2+  Skin temperature:  warm  Edema grading:  None  CFT:  " 3  Pedal hair growth:  Absent     NEUROLOGIC     Right Foot Neurologic   Light touch sensation: diminished  Hot/Cold sensation: diminished  Achilles reflex:  2+     Left Foot Neurologic   Light touch sensation: diminished  Hot/Cold sensation:  diminished  Achilles reflex:  2+    MUSCULOSKELETAL     Right Foot Musculoskeletal   Ecchymosis:  none  Tenderness:  none    Arch:  Normal     Left Foot Musculoskeletal   Ecchymosis:  none  Tenderness:  none  Arch:  Normal    MUSCLE STRENGTH     Right Foot Muscle Strength   Normal strength    Foot dorsiflexion:  5  Foot plantar flexion:  5  Foot inversion:  5  Foot eversion:  5     Left Foot Muscle Strength   Normal strength    Foot dorsiflexion:  5  Foot plantar flexion:  5  Foot inversion:  5  Foot eversion:  5    DERMATOLOGIC      Right Foot Dermatologic   Skin  Positive for ulcer and atrophy. Negative for cellulitis, erythema, maceration and gangrene.      Left Foot Dermatologic   Skin  Positive for cellulitis, erythema, maceration, ulcer and atrophy.      Right foot additional comments: Small dry atrophic wound involving the distal medial aspect of the right great toe.  No active drainage periwound erythema or maceration.     Left foot additional comments: Small wound involving the distal medial aspect of the left great toe with mild periwound maceration.  Erythema and edema to the distal phalanx region.            Results from last 7 days   Lab Units 01/14/25  0611   WBC 10*3/mm3 8.29   HEMOGLOBIN g/dL 12.7*   HEMATOCRIT % 41.4   PLATELETS 10*3/mm3 267       Assessment & Plan     Cellulitis of left lower extremity  Chronic ulcer limited to skin, right great toe  Chronic ulcer with bone necrosis, left great toe  Pathologic fracture versus traumatic fracture, left great toe  Type 2 diabetes mellitus with peripheral neuropathy    Patient has been admitted for concerns of his left great toe.  He does have wounds involving the distal aspect of both great toes and feels that  they have only been present for approximately 2 weeks.  Patient does have issues with of neuropathy denies any history of open wounds or callus formation involving his feet prior to these issues.  The right toe is relatively stable and atrophic but pulse was difficult to palpate on the right foot.  I have ordered ABIs for evaluation.  The left great toe is edematous, erythematous, with mild maceration involving the wound site.  Imaging was reviewed showing fractures involving the distal phalanx.  It is difficult to discern whether this is secondary to pathologic or posttraumatic etiology.  Consult was placed with infectious disease service.  I did review treatment options for this issue.  Patient does not want to consider any type of amputation.  I do feel that observation and antibiotics would be appropriate.  Patient requires postop shoes to both feet and daily Betadine application with bandages.  No operative plans from my standpoint.    Thank you for the consultation and allowing me to participate in this patient's care. Please call with any additional questions or concerns.     Note is dictated utilizing voice recognition software. Unfortunately this leads to occasional typographical errors. I apologize in advance if the situation occurs. If questions occur please do not hesitate to call our office.

## 2025-01-15 LAB
ANION GAP SERPL CALCULATED.3IONS-SCNC: 7.5 MMOL/L (ref 5–15)
BASOPHILS # BLD AUTO: 0.02 10*3/MM3 (ref 0–0.2)
BASOPHILS NFR BLD AUTO: 0.2 % (ref 0–1.5)
BUN SERPL-MCNC: 13 MG/DL (ref 8–23)
BUN/CREAT SERPL: 15.1 (ref 7–25)
CALCIUM SPEC-SCNC: 9.7 MG/DL (ref 8.6–10.5)
CHLORIDE SERPL-SCNC: 104 MMOL/L (ref 98–107)
CO2 SERPL-SCNC: 30.5 MMOL/L (ref 22–29)
CREAT SERPL-MCNC: 0.86 MG/DL (ref 0.76–1.27)
DEPRECATED RDW RBC AUTO: 45.5 FL (ref 37–54)
EGFRCR SERPLBLD CKD-EPI 2021: 93.1 ML/MIN/1.73
EOSINOPHIL # BLD AUTO: 0.08 10*3/MM3 (ref 0–0.4)
EOSINOPHIL NFR BLD AUTO: 0.9 % (ref 0.3–6.2)
ERYTHROCYTE [DISTWIDTH] IN BLOOD BY AUTOMATED COUNT: 12.9 % (ref 12.3–15.4)
GLUCOSE SERPL-MCNC: 118 MG/DL (ref 65–99)
HBA1C MFR BLD: 6.46 % (ref 4.8–5.6)
HCT VFR BLD AUTO: 43.1 % (ref 37.5–51)
HGB BLD-MCNC: 13.5 G/DL (ref 13–17.7)
IMM GRANULOCYTES # BLD AUTO: 0.05 10*3/MM3 (ref 0–0.05)
IMM GRANULOCYTES NFR BLD AUTO: 0.6 % (ref 0–0.5)
LYMPHOCYTES # BLD AUTO: 1.66 10*3/MM3 (ref 0.7–3.1)
LYMPHOCYTES NFR BLD AUTO: 18.8 % (ref 19.6–45.3)
MAGNESIUM SERPL-MCNC: 2.1 MG/DL (ref 1.6–2.4)
MCH RBC QN AUTO: 29.8 PG (ref 26.6–33)
MCHC RBC AUTO-ENTMCNC: 31.3 G/DL (ref 31.5–35.7)
MCV RBC AUTO: 95.1 FL (ref 79–97)
MONOCYTES # BLD AUTO: 0.53 10*3/MM3 (ref 0.1–0.9)
MONOCYTES NFR BLD AUTO: 6 % (ref 5–12)
NEUTROPHILS NFR BLD AUTO: 6.5 10*3/MM3 (ref 1.7–7)
NEUTROPHILS NFR BLD AUTO: 73.5 % (ref 42.7–76)
NRBC BLD AUTO-RTO: 0 /100 WBC (ref 0–0.2)
PLATELET # BLD AUTO: 293 10*3/MM3 (ref 140–450)
PMV BLD AUTO: 10 FL (ref 6–12)
POTASSIUM SERPL-SCNC: 4.3 MMOL/L (ref 3.5–5.2)
RBC # BLD AUTO: 4.53 10*6/MM3 (ref 4.14–5.8)
SODIUM SERPL-SCNC: 142 MMOL/L (ref 136–145)
WBC NRBC COR # BLD AUTO: 8.84 10*3/MM3 (ref 3.4–10.8)

## 2025-01-15 PROCEDURE — 80048 BASIC METABOLIC PNL TOTAL CA: CPT

## 2025-01-15 PROCEDURE — 85025 COMPLETE CBC W/AUTO DIFF WBC: CPT

## 2025-01-15 PROCEDURE — 94664 DEMO&/EVAL PT USE INHALER: CPT

## 2025-01-15 PROCEDURE — 63710000001 PREDNISONE PER 1 MG

## 2025-01-15 PROCEDURE — 94799 UNLISTED PULMONARY SVC/PX: CPT

## 2025-01-15 PROCEDURE — 83036 HEMOGLOBIN GLYCOSYLATED A1C: CPT | Performed by: NURSE PRACTITIONER

## 2025-01-15 PROCEDURE — 99232 SBSQ HOSP IP/OBS MODERATE 35: CPT | Performed by: PODIATRIST

## 2025-01-15 PROCEDURE — 83735 ASSAY OF MAGNESIUM: CPT

## 2025-01-15 PROCEDURE — 25010000002 HYDROMORPHONE 1 MG/ML SOLUTION: Performed by: HOSPITALIST

## 2025-01-15 PROCEDURE — 94761 N-INVAS EAR/PLS OXIMETRY MLT: CPT

## 2025-01-15 PROCEDURE — 63710000001 INSULIN GLARGINE PER 5 UNITS

## 2025-01-15 RX ORDER — DOXYCYCLINE 100 MG/1
100 CAPSULE ORAL EVERY 12 HOURS SCHEDULED
Status: DISCONTINUED | OUTPATIENT
Start: 2025-01-15 | End: 2025-01-16 | Stop reason: HOSPADM

## 2025-01-15 RX ORDER — ENOXAPARIN SODIUM 100 MG/ML
40 INJECTION SUBCUTANEOUS EVERY 24 HOURS
Status: DISCONTINUED | OUTPATIENT
Start: 2025-01-15 | End: 2025-01-16 | Stop reason: HOSPADM

## 2025-01-15 RX ADMIN — LISINOPRIL 40 MG: 20 TABLET ORAL at 09:05

## 2025-01-15 RX ADMIN — ACETAMINOPHEN 650 MG: 325 TABLET, FILM COATED ORAL at 21:02

## 2025-01-15 RX ADMIN — ATORVASTATIN CALCIUM 20 MG: 20 TABLET, FILM COATED ORAL at 21:02

## 2025-01-15 RX ADMIN — ACETAMINOPHEN 650 MG: 325 TABLET, FILM COATED ORAL at 09:06

## 2025-01-15 RX ADMIN — PANTOPRAZOLE SODIUM 40 MG: 40 TABLET, DELAYED RELEASE ORAL at 09:06

## 2025-01-15 RX ADMIN — TRAZODONE HYDROCHLORIDE 200 MG: 100 TABLET ORAL at 21:03

## 2025-01-15 RX ADMIN — HYDROMORPHONE HYDROCHLORIDE 0.5 MG: 1 INJECTION, SOLUTION INTRAMUSCULAR; INTRAVENOUS; SUBCUTANEOUS at 09:06

## 2025-01-15 RX ADMIN — ASPIRIN 81 MG: 81 TABLET, COATED ORAL at 09:06

## 2025-01-15 RX ADMIN — BUPROPION HYDROCHLORIDE 300 MG: 150 TABLET, EXTENDED RELEASE ORAL at 09:05

## 2025-01-15 RX ADMIN — AMOXICILLIN AND CLAVULANATE POTASSIUM 1 TABLET: 875; 125 TABLET, FILM COATED ORAL at 21:02

## 2025-01-15 RX ADMIN — DOXYCYCLINE 100 MG: 100 CAPSULE ORAL at 21:03

## 2025-01-15 RX ADMIN — PREDNISONE 20 MG: 20 TABLET ORAL at 09:05

## 2025-01-15 RX ADMIN — BUDESONIDE AND FORMOTEROL FUMARATE DIHYDRATE 2 PUFF: 160; 4.5 AEROSOL RESPIRATORY (INHALATION) at 19:10

## 2025-01-15 RX ADMIN — BUDESONIDE AND FORMOTEROL FUMARATE DIHYDRATE 2 PUFF: 160; 4.5 AEROSOL RESPIRATORY (INHALATION) at 10:24

## 2025-01-15 RX ADMIN — PYRIDOSTIGMINE BROMIDE 180 MG: 60 TABLET ORAL at 21:02

## 2025-01-15 RX ADMIN — INSULIN GLARGINE 10 UNITS: 100 INJECTION, SOLUTION SUBCUTANEOUS at 09:06

## 2025-01-15 RX ADMIN — PYRIDOSTIGMINE BROMIDE 180 MG: 60 TABLET ORAL at 09:05

## 2025-01-15 RX ADMIN — Medication 5 MG: at 21:02

## 2025-01-15 NOTE — CONSULTS
Infectious Diseases Consult Note    Referring Provider: Luz Duarte MD    Reason for Consultation: Bilateral toe wounds    Patient Care Team:  Isreal Del Toro APRN as PCP - General (Nurse Practitioner)    Chief complaint bilateral great toe wounds    Subjective     History of present illness:      69-year-old male presents the hospital on 1/13/2024 for worsening bilateral toe wounds.  Patient feels that the toe wounds have been there for several weeks after he received new shoes around [place].  Patient is diabetic but denies being immunocompromise.  Patient denies fever, chills, diaphoresis, shortness of breath, cough, GI symptoms, or any urinary symptoms.    Review of Systems   Review of Systems   Constitutional: Negative.    HENT: Negative.     Eyes: Negative.    Respiratory: Negative.     Cardiovascular: Negative.    Gastrointestinal: Negative.    Endocrine: Negative.    Genitourinary: Negative.    Musculoskeletal: Negative.    Skin: Negative.  Positive for wound.   Neurological: Negative.    Psychiatric/Behavioral: Negative.     All other systems reviewed and are negative.      Medications  Medications Prior to Admission   Medication Sig Dispense Refill Last Dose/Taking    Aspirin Low Dose 81 MG EC tablet Take 1 tablet by mouth Daily.   1/13/2025    atorvastatin (LIPITOR) 20 MG tablet Take 1 tablet by mouth Every Night.   1/12/2025    budesonide-formoterol (SYMBICORT) 160-4.5 MCG/ACT inhaler Inhale 2 puffs 2 (Two) Times a Day.   1/13/2025    buPROPion XL (WELLBUTRIN XL) 300 MG 24 hr tablet Take 1 tablet by mouth Every Morning.   1/13/2025    Farxiga 10 MG tablet Take 10 mg by mouth Daily.   1/13/2025    insulin glargine (LANTUS, SEMGLEE) 100 UNIT/ML injection Inject 10 Units under the skin into the appropriate area as directed Daily.   1/13/2025    lisinopril (PRINIVIL,ZESTRIL) 40 MG tablet Take 1 tablet by mouth Daily.   1/13/2025    meloxicam (MOBIC) 15 MG tablet Take 1 tablet by mouth Daily.    1/13/2025    metFORMIN (GLUCOPHAGE) 1000 MG tablet Take 1 tablet by mouth 2 (Two) Times a Day.   1/13/2025    omeprazole (priLOSEC) 40 MG capsule Take 1 capsule by mouth Daily.   1/12/2025    predniSONE (DELTASONE) 20 MG tablet Take 1 tablet by mouth Daily.   1/13/2025    pyridostigmine (MESTINON) 180 MG CR tablet Take 1 tablet by mouth 2 (Two) Times a Day.   1/13/2025    traZODone (DESYREL) 100 MG tablet Take 2 tablets by mouth Every Night.   1/12/2025    Valbenazine Tosylate 80 MG capsule Take 80 mg by mouth Every Morning.   1/13/2025    varenicline (CHANTIX) 0.5 MG tablet Take 1 tablet by mouth 2 (Two) Times a Day.   1/13/2025    zolpidem (AMBIEN) 10 MG tablet Take 1 tablet by mouth Every Night.   1/12/2025       History  Past Medical History:   Diagnosis Date    COPD (chronic obstructive pulmonary disease)     Diabetes mellitus     Myasthenia gravis     Myasthenia gravis     Tardive dyskinesia     Tardive dyskinesia      Past Surgical History:   Procedure Laterality Date    CHOLECYSTECTOMY N/A 2/15/2023    Procedure: CHOLECYSTECTOMY LAPAROSCOPIC WITH DAVINCI ROBOT;  Surgeon: Tha Franklin MD;  Location: [facility] OR;  Service: Robotics - DaVinci;  Laterality: N/A;       Family History  History reviewed. No pertinent family history.    Social History   reports that he has been smoking cigarettes. He has a 40 pack-year smoking history. He has never used smokeless tobacco. He reports that he does not drink alcohol and does not use drugs.    Allergies  Patient has no known allergies.    Objective     Vital Signs   Vital Signs (last 24 hours)         01/14 0700  01/15 0659 01/15 0700  01/15 1454   Most Recent      Temp (°F) 96.9 -  98.2    97.6 -  98.3     97.6 (36.4) 01/15 1226    Heart Rate 51 -  70    57 -  62     59 01/15 1226    Resp 16 -  20      16     16 01/15 1027    BP 94/57 -  122/49    117/57 -  118/67     117/57 01/15 1226    SpO2 (%) 91 -  95    91 -  92     92 01/15 1226    Flow (L/min)  (Oxygen Therapy)   2                    Physical Exam:  Physical Exam  Vitals and nursing note reviewed.   Constitutional:       General: He is not in acute distress.     Appearance: Normal appearance. He is well-developed and normal weight. He is not diaphoretic.   HENT:      Head: Normocephalic and atraumatic.   Eyes:      Conjunctiva/sclera: Conjunctivae normal.      Pupils: Pupils are equal, round, and reactive to light.   Cardiovascular:      Rate and Rhythm: Normal rate and regular rhythm.      Heart sounds: Normal heart sounds, S1 normal and S2 normal.   Pulmonary:      Effort: Pulmonary effort is normal. No respiratory distress.      Breath sounds: Normal breath sounds. No stridor. No wheezing or rales.   Abdominal:      General: Bowel sounds are normal. There is no distension.      Palpations: Abdomen is soft. There is no mass.      Tenderness: There is no abdominal tenderness. There is no guarding.   Musculoskeletal:         General: No deformity. Normal range of motion.      Cervical back: Neck supple.   Skin:     General: Skin is warm and dry.      Coloration: Skin is not pale.      Findings: No erythema or rash.      Comments: Patient is stable looking wounds to the distal aspect of both great toes.  Pulses are easily palpable.  Currently no drainage   Neurological:      Mental Status: He is alert and oriented to person, place, and time.      Cranial Nerves: No cranial nerve deficit.   Psychiatric:         Mood and Affect: Mood normal.     Right                       Microbiology  Microbiology Results (last 10 days)       Procedure Component Value - Date/Time    Wound Culture - Wound, Toe, Left [[id number]] Collected: 01/14/25 1701    Lab Status: Preliminary result Specimen: Wound from Toe, Left Updated: 01/15/25 0957     Wound Culture Growth present, too young to evaluate     Gram Stain No organisms seen    COVID-19, FLU A/B, RSV PCR 1 HR TAT - Swab, Nasopharynx [[id number]]  (Normal) Collected: 01/14/25  1451    Lab Status: Final result Specimen: Swab from Nasopharynx Updated: 01/14/25 1557     COVID19 Not Detected     Influenza A PCR Not Detected     Influenza B PCR Not Detected     RSV, PCR Not Detected    Narrative:      Fact sheet for providers: https://www.fda.gov/media/364994/download    Fact sheet for patients: https://www.fda.gov/media/267599/download    Test performed by PCR.    MRSA Screen, PCR (Inpatient) - Swab, Nares [[id number]]  (Normal) Collected: 01/13/25 1519    Lab Status: Final result Specimen: Swab from Nares Updated: 01/13/25 1703     MRSA PCR No MRSA Detected    Narrative:      The negative predictive value of this diagnostic test is high and should only be used to consider de-escalating anti-MRSA therapy. A positive result may indicate colonization with MRSA and must be correlated clinically.    Blood Culture - Blood, Hand, Left [[id number]]  (Normal) Collected: 01/13/25 1319    Lab Status: Preliminary result Specimen: Blood from Hand, Left Updated: 01/15/25 1330     Blood Culture No growth at 2 days    Blood Culture - Blood, Arm, Right [[id number]]  (Normal) Collected: 01/13/25 1242    Lab Status: Preliminary result Specimen: Blood from Arm, Right Updated: 01/15/25 1300     Blood Culture No growth at 2 days    Narrative:      Less than seven (7) mL's of blood was collected.  Insufficient quantity may yield false negative results.            Laboratory  Results from last 7 days   Lab Units 01/15/25  0255   WBC 10*3/mm3 8.84   HEMOGLOBIN g/dL 13.5   HEMATOCRIT % 43.1   PLATELETS 10*3/mm3 293     Results from last 7 days   Lab Units 01/15/25  0255   SODIUM mmol/L 142   POTASSIUM mmol/L 4.3   CHLORIDE mmol/L 104   CO2 mmol/L 30.5*   BUN mg/dL 13   CREATININE mg/dL 0.86   GLUCOSE mg/dL 118*   CALCIUM mg/dL 9.7     Results from last 7 days   Lab Units 01/15/25  0255   SODIUM mmol/L 142   POTASSIUM mmol/L 4.3   CHLORIDE mmol/L 104   CO2 mmol/L 30.5*   BUN mg/dL 13   CREATININE mg/dL 0.86   GLUCOSE  mg/dL 118*   CALCIUM mg/dL 9.7         Results from last 7 days   Lab Units 01/13/25  1242   SED RATE mm/hr 31*           Radiology  Imaging Results (Last 72 Hours)       Procedure Component Value Units Date/Time    MRI Foot Left With & Without Contrast [[id number]] Collected: 01/14/25 0828     Updated: 01/14/25 0843    Narrative:      MRI FOOT LEFT W WO CONTRAST    Date of Exam: 1/14/2025 2:01 AM EST    Indication: r/o osteo L foot. Soft tissue swelling great toe. Osseous destruction and fracture distal phalanx great toe.     Comparison: Left foot x-ray 1/13/2025    Technique:  Routine multiplanar/multisequence sequence images of the left foot were obtained before and after the uneventful administration of Prohance.        Findings:  There is abnormal low T1 high T2 signal intensity of the distal phalanx of the great toe. There is comminuted fracture of the distal phalanx. There is an ulceration at the distal aspect of the great toe. It is possible this is a pathologic fracture   however could also be seen with trauma. The marrow signal intensity could be posttraumatic or due to osteomyelitis. Clinical correlation is necessary. There is a small joint effusion of the interphalangeal joint great toe. There is diffuse soft tissue   swelling which could be posttraumatic or due to soft tissue infection.    The remainder the bone marrow signal intensity field-of-view is normal. Mild joint space narrowing first metatarsophalangeal joint and fifth metatarsal phalangeal joint. Flexor and extensor tendons are intact.      Impression:      Impression:  There is a comminuted fracture distal phalanx great toe with abnormal marrow signal intensity. It is uncertain whether this is due to a pathologic fracture or posttraumatic fracture. Correlate with history and symptoms. No drainable fluid collection   suggest abscess. Questionable ulceration at the distal tip of the great toe.  Small joint effusion of the great toe  interphalangeal joint.  Mild arthritis first and fifth metatarsal phalangeal joint.        Electronically Signed: Lana Nguyen MD    1/14/2025 8:41 AM EST    Workstation ID: [id number]    XR Foot 2 View Bilateral [[id number]] Collected: 01/13/25 1353     Updated: 01/13/25 1357    Narrative:      XR FOOT 2 VW BILATERAL    Date of Exam: 1/13/2025 1:36 PM EST    Indication: diabeteic toe    Comparison: None available.    Findings:  Comminuted fracture involving the mid to distal aspect of the distal phalanx of the left first toe. Additionally, there is generalized osteopenia involving the distal phalanx of the left first toe with vague areas of cortical loss suggesting the   potential for underlying/superimposed osteomyelitis. Interossea structures appear intact. Alignment is normal. Joint space is adequately maintained. Soft tissues are unremarkable.      Impression:      Impression:  1.Relative nondisplaced comminuted fracture involving the distal phalanx of the left first toe with likely underlying/superimposed osteomyelitis. Correlate with symptoms and history.      Electronically Signed: Marko iDmas MD    1/13/2025 1:55 PM EST    Workstation ID: [id number]            Cardiology      Results Review:  I have reviewed all clinical data, test, lab, and imaging results.       Schedule Meds  aspirin, 81 mg, Oral, Daily  atorvastatin, 20 mg, Oral, Nightly  budesonide-formoterol, 2 puff, Inhalation, BID - RT  buPROPion XL, 300 mg, Oral, Daily  [Held by provider] empagliflozin, 25 mg, Oral, Daily  enoxaparin, 40 mg, Subcutaneous, Q24H  insulin glargine, 10 Units, Subcutaneous, Daily  lisinopril, 40 mg, Oral, Daily  [Held by provider] metFORMIN, 1,000 mg, Oral, BID  pantoprazole, 40 mg, Oral, QAM AC  predniSONE, 20 mg, Oral, Daily  pyridostigmine, 180 mg, Oral, Q12H  traZODone, 200 mg, Oral, Nightly        Infusion Meds       PRN Meds    acetaminophen **OR** acetaminophen **OR** acetaminophen    senna-docusate sodium **AND**  polyethylene glycol **AND** bisacodyl **AND** bisacodyl    Calcium Replacement - Follow Nurse / BPA Driven Protocol    HYDROmorphone    Magnesium Standard Dose Replacement - Follow Nurse / BPA Driven Protocol    melatonin    ondansetron ODT **OR** ondansetron    oxyCODONE    Phosphorus Replacement - Follow Nurse / BPA Driven Protocol    Potassium Replacement - Follow Nurse / BPA Driven Protocol      Assessment & Plan       Assessment    Bilateral wounds to the distal aspect of both toes-left worse than right.  Currently no drainage  there is no significant erythema or edema to the dorsal aspect of the foot.  Pulses are easily palpable.  Culture of the left toe pending-patient states there was some drainage at admission.  MRI of the left foot showed commuted fracture of the distal phalanx of the great toe    Type 2 diabetes    COPD-current everyday smoker    Myasthenia gravis    Plan    Start p.o. Augmentin 875/125 mg twice daily for 14 days  Start p.o. doxycycline 100 mg twice daily for 14 days  Waiting on wound culture  Continue supportive care  A.m. labs  Patient can likely be discharged home tomorrow on oral antibiotics  Tobacco abuse cessation  Case discussed with podiatry    Megan Fagan, APRN  01/15/25  14:54 EST    Note is dictated utilizing voice recognition software/Dragon

## 2025-01-15 NOTE — PROGRESS NOTES
[facility] MEDICINE SERVICE  DAILY PROGRESS NOTE    NAME: Tha Barron  : 1955  MRN: [id number]      LOS: 2 days     PROVIDER OF SERVICE: Luz Duarte MD    Chief Complaint: Cellulitis of left lower extremity    Subjective:     Interval History:    Patient seen and evaluated at bedside. Reports some pain in bilateral lower extremities. ID consult pending.     Treatment plan discussed with patient. All questions addressed.     Review of Systems:   Denies fevers, chills  Denies chest pain, edema  Denies shortness of breath, cough  Denies nausea, vomiting, diarrhea  Denies dysuria, hematuria  +BLE pain    Objective:     Vital Signs  Temp:  [96.9 °F (36.1 °C)-98.3 °F (36.8 °C)] 98.3 °F (36.8 °C)  Heart Rate:  [51-70] 57  Resp:  [16-20] 16  BP: ()/(48-67) 118/67  Flow (L/min) (Oxygen Therapy):  [2] 2   Body mass index is 32.5 kg/m².    Physical Exam   General: No acute distress  Neuro: AAOx3, no FND  CV: RRR, no peripheral edema  Pulm: CTAB, no increased work of breathing  Abd: Soft, nontender, nondistended  Skin: Left foot bandaged without drainage  Psych: Appropriate mood and affect    Scheduled Meds   aspirin, 81 mg, Oral, Daily  atorvastatin, 20 mg, Oral, Nightly  budesonide-formoterol, 2 puff, Inhalation, BID - RT  buPROPion XL, 300 mg, Oral, Daily  [Held by provider] empagliflozin, 25 mg, Oral, Daily  insulin glargine, 10 Units, Subcutaneous, Daily  lisinopril, 40 mg, Oral, Daily  [Held by provider] metFORMIN, 1,000 mg, Oral, BID  pantoprazole, 40 mg, Oral, QAM AC  predniSONE, 20 mg, Oral, Daily  pyridostigmine, 180 mg, Oral, Q12H  traZODone, 200 mg, Oral, Nightly  zolpidem, 10 mg, Oral, Nightly       PRN Meds     acetaminophen **OR** acetaminophen **OR** acetaminophen    senna-docusate sodium **AND** polyethylene glycol **AND** bisacodyl **AND** bisacodyl    Calcium Replacement - Follow Nurse / BPA Driven Protocol    HYDROmorphone    Magnesium Standard Dose Replacement - Follow  Nurse / BPA Driven Protocol    melatonin    ondansetron ODT **OR** ondansetron    oxyCODONE    Phosphorus Replacement - Follow Nurse / BPA Driven Protocol    Potassium Replacement - Follow Nurse / BPA Driven Protocol   Infusions         Diagnostic Data    Results from last 7 days   Lab Units 01/15/25  0255 01/14/25  0611 01/13/25  1242   WBC 10*3/mm3 8.84   < > 10.85*   HEMOGLOBIN g/dL 13.5   < > 13.2   HEMATOCRIT % 43.1   < > 41.0   PLATELETS 10*3/mm3 293   < > 269   GLUCOSE mg/dL 118*   < > 115*   CREATININE mg/dL 0.86   < > 1.03   BUN mg/dL 13   < > 14   SODIUM mmol/L 142   < > 139   POTASSIUM mmol/L 4.3   < > 4.4   AST (SGOT) U/L  --   --  47*   ALT (SGPT) U/L  --   --  42*   ALK PHOS U/L  --   --  54   BILIRUBIN mg/dL  --   --  0.2   ANION GAP mmol/L 7.5   < > 14.4    < > = values in this interval not displayed.       MRI Foot Left With & Without Contrast    Result Date: 1/14/2025  Impression: There is a comminuted fracture distal phalanx great toe with abnormal marrow signal intensity. It is uncertain whether this is due to a pathologic fracture or posttraumatic fracture. Correlate with history and symptoms. No drainable fluid collection suggest abscess. Questionable ulceration at the distal tip of the great toe. Small joint effusion of the great toe interphalangeal joint. Mild arthritis first and fifth metatarsal phalangeal joint. Electronically Signed: Lana Nguyen MD  1/14/2025 8:41 AM EST  Workstation ID: [id number]    XR Foot 2 View Bilateral    Result Date: 1/13/2025  Impression: 1.Relative nondisplaced comminuted fracture involving the distal phalanx of the left first toe with likely underlying/superimposed osteomyelitis. Correlate with symptoms and history. Electronically Signed: Marko Dimas MD  1/13/2025 1:55 PM EST  Workstation ID: [id number]     Interval results reviewed.    Assessment/Plan:     Diabetic foot ulcer, bilateral feet  Osteomyelitis of left foot  - Podiatry consulted, deferring surgical  intervention at this time  - ID consulted, appreciate recs  - Blood cultures pending  - Started on unasyn and vancomycin, will continue pending ID recs  - Analgesia    Type 2 diabetes mellitus  - Lantus 10 nightly  - SSI, hypoglycemia protocol, CCD  - Diabetic educator consulted, appreciate recs    Hypertension  - Continue lisinopril    Insomnia  - Continue trazodone    COPD  - Not in exacerbation  - Continue home inhalers    Myasthenia gravis  Tardive dyskinesia  - Continue prednisone, pyridostigmine  - Valbenazine not on formulary    Hyperlipidemia  - Continue asa/statin    Depression  - Continue wellbutrin    GERD  - Continue PPI    Treatment plan discussed with nursing staff, case management.     VTE Prophylaxis:  Mechanical VTE prophylaxis orders are present.        Code status is   Code Status and Medical Interventions: CPR (Attempt to Resuscitate); Full Support   Ordered at: 01/13/25 1402     Code Status (Patient has no pulse and is not breathing):    CPR (Attempt to Resuscitate)     Medical Interventions (Patient has pulse or is breathing):    Full Support       Plan for disposition: Home 1/17/25    Barriers to discharge: ID consult pending, IV abx    Time: 35+ minutes     Signature: Electronically signed by Luz Duarte MD, 01/15/25, 08:16 EST.  [facility] Hospitalist Team

## 2025-01-15 NOTE — NURSING NOTE
WOCN note:    69 yr old male admitted 1/13/25 with worsening bilateral great toe wounds. WOCN and podiatry consulted. Podiatry has evaluated and provided wound care orders. WOCN will defer consult at this time but will follow as needed.

## 2025-01-15 NOTE — PLAN OF CARE
Goal Outcome Evaluation:      Patient sleeping in between care. Wound care proved to wounds on bilateral great toes. Remains on room air. Patient stable at this time.

## 2025-01-15 NOTE — NURSING NOTE
This nurse called Purchasing dept again and asked about shoes. Was told that they forgot and they would go look. Said that they did have a pair of XL shoes and would send them up with the staff that stocks the floor. Asked them to leave them at the desk for this nurse.

## 2025-01-15 NOTE — CONSULTS
"Diabetes Education  Assessment/Teaching    Patient Name:  Tha Barron  YOB: 1955  MRN: [id number]  Admit Date:  1/13/2025      Assessment Date:  1/15/2025  Flowsheet Row Most Recent Value   General Information     Referral From: MD order  [MD consult for A1c >7.0%.]   Height 190.5 cm (75\")   Weight 118 kg (260 lb)   Pregnancy Assessment    Diabetes History    What type of diabetes do you have? Type 2   Length of Diabetes Diagnosis 1 - 5 years  [Patient stated he was diagnosed about 5 years ago.]   Current DM knowledge fair   Do you test your blood sugar at home? no   Education Preferences    What areas of diabetes would you like to learn about? testing my blood sugar at home   Nutrition Information    Assessment Topics    Monitoring - Assessment Needs education   DM Goals    Monitoring - Goal Today            Flowsheet Row Most Recent Value   DM Education Needs    Meter Needs meter   Meter Type Accuchek   Frequency of Testing AC meals  [Discussed with patient that it is recommended to check blood sugar before meals 3X a day.]   Medication Oral, Insulin, Pen  [At home patient stated that he takes Lantus 10 units every morning, Metformin 1000 mg BID, and Farxiga 10 mg daily.]   Problem Solving Hyperglycemia, Signs, Symptoms, Treatment   Reducing Risks A1C testing  [Last A1c in system was 7.2% on 2/14/2023. No new A1c ordered.]   Discharge Plan Home   Motivation Moderate   Teaching Method Discussion   Patient Response Verbalized understanding              Other Comments:  Patient stated he lost his glucometer in a move about 14 months ago. His doctor tried to order him a new meter but insurance denied it. Patient stated he lives in a group home and has to eat what is offered. Patient stated he drinks diet Coke and water. Patient stated he doesn't get much exercise other than going up and down the stairs when he wants to smoke. Patient admitted with cellulitis of the left foot. Discussed foot " care with patient and the importance of wearing soled shoes when out of bed and checking feet daily. Checked with transitions of care team and Accu-chek Guide Me glucometer is covered when using a voucher.  Prescriptions started in discharge orders for lancets, test strips, and Accu-chek Guide Me glucometer. Patient has no further questions or concerns related to diabetes at this time.      Electronically signed by:  Sylvia Schaeffer RN  01/15/25 12:37 EST

## 2025-01-15 NOTE — PROGRESS NOTES
01/15/25   Foot and Ankle Surgery - Inpatient Follow-up  Provider: Dr. Dashawn Trevino DPM  Location: [facility]    Chief Complaint: Bilateral great toe wounds    Subjective:  Tha Barron is a 69 y.o. male.     Patient is doing well.  He feels that the left toe has improved since yesterday.  No new concerns or issues.    No Known Allergies    No current facility-administered medications on file prior to encounter.     Current Outpatient Medications on File Prior to Encounter   Medication Sig Dispense Refill    Aspirin Low Dose 81 MG EC tablet Take 1 tablet by mouth Daily.      atorvastatin (LIPITOR) 20 MG tablet Take 1 tablet by mouth Every Night.      budesonide-formoterol (SYMBICORT) 160-4.5 MCG/ACT inhaler Inhale 2 puffs 2 (Two) Times a Day.      buPROPion XL (WELLBUTRIN XL) 300 MG 24 hr tablet Take 1 tablet by mouth Every Morning.      Farxiga 10 MG tablet Take 10 mg by mouth Daily.      insulin glargine (LANTUS, SEMGLEE) 100 UNIT/ML injection Inject 10 Units under the skin into the appropriate area as directed Daily.      lisinopril (PRINIVIL,ZESTRIL) 40 MG tablet Take 1 tablet by mouth Daily.      meloxicam (MOBIC) 15 MG tablet Take 1 tablet by mouth Daily.      metFORMIN (GLUCOPHAGE) 1000 MG tablet Take 1 tablet by mouth 2 (Two) Times a Day.      omeprazole (priLOSEC) 40 MG capsule Take 1 capsule by mouth Daily.      predniSONE (DELTASONE) 20 MG tablet Take 1 tablet by mouth Daily.      pyridostigmine (MESTINON) 180 MG CR tablet Take 1 tablet by mouth 2 (Two) Times a Day.      traZODone (DESYREL) 100 MG tablet Take 2 tablets by mouth Every Night.      Valbenazine Tosylate 80 MG capsule Take 80 mg by mouth Every Morning.      varenicline (CHANTIX) 0.5 MG tablet Take 1 tablet by mouth 2 (Two) Times a Day.      zolpidem (AMBIEN) 10 MG tablet Take 1 tablet by mouth Every Night.         Objective   /57 (BP Location: Left arm, Patient Position: Lying)   Pulse 59   Temp 97.6 °F (36.4 °C)  "(Oral)   Resp 16   Ht 190.5 cm (75\")   Wt 118 kg (260 lb)   SpO2 92%   BMI 32.50 kg/m²     GENERAL  Appearance:  disheveled and elderly  Orientation:  AAOx3  Affect:  appropriate     VASCULAR      Right Foot Vascularity   Dorsalis pedis:  Doppler  Posterior tibial:  Doppler  Skin temperature:  cool  Edema grading:  None  CFT:  4  Pedal hair growth:  Absent      Left Foot Vascularity   Dorsalis pedis:  2+  Posterior tibial:  2+  Skin temperature:  warm  Edema grading:  None  CFT:  3  Pedal hair growth:  Absent     NEUROLOGIC      Right Foot Neurologic   Light touch sensation: diminished  Hot/Cold sensation: diminished  Achilles reflex:  2+      Left Foot Neurologic   Light touch sensation: diminished  Hot/Cold sensation:  diminished  Achilles reflex:  2+     MUSCULOSKELETAL      Right Foot Musculoskeletal   Ecchymosis:  none  Tenderness:  none    Arch:  Normal      Left Foot Musculoskeletal   Ecchymosis:  none  Tenderness:  none  Arch:  Normal     MUSCLE STRENGTH      Right Foot Muscle Strength   Normal strength    Foot dorsiflexion:  5  Foot plantar flexion:  5  Foot inversion:  5  Foot eversion:  5      Left Foot Muscle Strength   Normal strength    Foot dorsiflexion:  5  Foot plantar flexion:  5  Foot inversion:  5  Foot eversion:  5     DERMATOLOGIC       Right Foot Dermatologic   Skin  Positive for ulcer and atrophy. Negative for cellulitis, erythema, maceration and gangrene.       Left Foot Dermatologic   Skin  Positive for cellulitis, erythema, maceration, ulcer and atrophy.      Right foot additional comments: Small dry atrophic wound involving the distal medial aspect of the right great toe.  No active drainage periwound erythema or maceration.     Left foot additional comments: Small wound involving the distal medial aspect of the left great toe with mild periwound maceration.  Erythema and edema to the distal phalanx region.    1/15/24: Less maceration involving the distal aspect of the left great toe " with decreased swelling and erythema.  Mild skin slough noted to the distal aspect of the digit.  No progressive deformity or instability.    Results from last 7 days   Lab Units 01/15/25  0255   WBC 10*3/mm3 8.84   HEMOGLOBIN g/dL 13.5   HEMATOCRIT % 43.1   PLATELETS 10*3/mm3 293       Assessment & Plan       Cellulitis of left lower extremity      Reviewed MRI findings with patient at bedside.  Findings could represent posttraumatic etiology of issues given that patient does have neuropathy.  Patient has a normal WBC with no local or constitutional signs of active infection at this time.  I do feel that observation is appropriate.  Will discuss with infectious disease service as I do feel that oral antibiotics and close observation would be appropriate.  Patient is to continue with Betadine application on a daily basis and I do feel that he would benefit from flat post-op shoes.  Patient will require follow-up with me in 1 to 2 weeks after discharge.  No further plans from my standpoint.  Will sign off at this time.    Note is dictated utilizing voice recognition software. Unfortunately this leads to occasional typographical errors. I apologize in advance if the situation occurs. If questions occur please do not hesitate to call our office.

## 2025-01-15 NOTE — NURSING NOTE
Called purchasing again because shoes had not been delivered yet. Said that they were on the stocking cart to be delivered.

## 2025-01-15 NOTE — NURSING NOTE
Called ER looking for flat post op shoe. Said that they had them but not many and to call [facility] to get the shoes. Called [facility] and they said to call purchasing. This nurse called purchasing and was told that they were not in the supply room and they would look when they returned and call me back.

## 2025-01-15 NOTE — PLAN OF CARE
Goal Outcome Evaluation:   Plan is for patient to return to group home when medically stable.

## 2025-01-16 ENCOUNTER — READMISSION MANAGEMENT (OUTPATIENT)
Dept: CALL CENTER | Facility: HOSPITAL | Age: 70
End: 2025-01-16
Payer: MEDICAID

## 2025-01-16 VITALS
TEMPERATURE: 98.2 F | SYSTOLIC BLOOD PRESSURE: 111 MMHG | HEIGHT: 75 IN | DIASTOLIC BLOOD PRESSURE: 66 MMHG | OXYGEN SATURATION: 96 % | WEIGHT: 260 LBS | HEART RATE: 60 BPM | RESPIRATION RATE: 16 BRPM | BODY MASS INDEX: 32.33 KG/M2

## 2025-01-16 LAB
ANION GAP SERPL CALCULATED.3IONS-SCNC: 7.9 MMOL/L (ref 5–15)
BACTERIA SPEC AEROBE CULT: NORMAL
BASOPHILS # BLD AUTO: 0.03 10*3/MM3 (ref 0–0.2)
BASOPHILS NFR BLD AUTO: 0.4 % (ref 0–1.5)
BUN SERPL-MCNC: 16 MG/DL (ref 8–23)
BUN/CREAT SERPL: 20 (ref 7–25)
CALCIUM SPEC-SCNC: 9 MG/DL (ref 8.6–10.5)
CHLORIDE SERPL-SCNC: 103 MMOL/L (ref 98–107)
CO2 SERPL-SCNC: 28.1 MMOL/L (ref 22–29)
CREAT SERPL-MCNC: 0.8 MG/DL (ref 0.76–1.27)
DEPRECATED RDW RBC AUTO: 44.3 FL (ref 37–54)
EGFRCR SERPLBLD CKD-EPI 2021: 95.2 ML/MIN/1.73
EOSINOPHIL # BLD AUTO: 0.12 10*3/MM3 (ref 0–0.4)
EOSINOPHIL NFR BLD AUTO: 1.4 % (ref 0.3–6.2)
ERYTHROCYTE [DISTWIDTH] IN BLOOD BY AUTOMATED COUNT: 12.8 % (ref 12.3–15.4)
GLUCOSE SERPL-MCNC: 111 MG/DL (ref 65–99)
GRAM STN SPEC: NORMAL
HCT VFR BLD AUTO: 42.3 % (ref 37.5–51)
HGB BLD-MCNC: 13.4 G/DL (ref 13–17.7)
IMM GRANULOCYTES # BLD AUTO: 0.06 10*3/MM3 (ref 0–0.05)
IMM GRANULOCYTES NFR BLD AUTO: 0.7 % (ref 0–0.5)
LYMPHOCYTES # BLD AUTO: 2.01 10*3/MM3 (ref 0.7–3.1)
LYMPHOCYTES NFR BLD AUTO: 23.6 % (ref 19.6–45.3)
MAGNESIUM SERPL-MCNC: 2 MG/DL (ref 1.6–2.4)
MCH RBC QN AUTO: 29.8 PG (ref 26.6–33)
MCHC RBC AUTO-ENTMCNC: 31.7 G/DL (ref 31.5–35.7)
MCV RBC AUTO: 94.2 FL (ref 79–97)
MONOCYTES # BLD AUTO: 0.57 10*3/MM3 (ref 0.1–0.9)
MONOCYTES NFR BLD AUTO: 6.7 % (ref 5–12)
NEUTROPHILS NFR BLD AUTO: 5.71 10*3/MM3 (ref 1.7–7)
NEUTROPHILS NFR BLD AUTO: 67.2 % (ref 42.7–76)
NRBC BLD AUTO-RTO: 0 /100 WBC (ref 0–0.2)
PLATELET # BLD AUTO: 261 10*3/MM3 (ref 140–450)
PMV BLD AUTO: 10 FL (ref 6–12)
POTASSIUM SERPL-SCNC: 4 MMOL/L (ref 3.5–5.2)
RBC # BLD AUTO: 4.49 10*6/MM3 (ref 4.14–5.8)
SODIUM SERPL-SCNC: 139 MMOL/L (ref 136–145)
WBC NRBC COR # BLD AUTO: 8.5 10*3/MM3 (ref 3.4–10.8)

## 2025-01-16 PROCEDURE — 85025 COMPLETE CBC W/AUTO DIFF WBC: CPT

## 2025-01-16 PROCEDURE — 63710000001 INSULIN GLARGINE PER 5 UNITS

## 2025-01-16 PROCEDURE — 94664 DEMO&/EVAL PT USE INHALER: CPT

## 2025-01-16 PROCEDURE — 80048 BASIC METABOLIC PNL TOTAL CA: CPT

## 2025-01-16 PROCEDURE — 94761 N-INVAS EAR/PLS OXIMETRY MLT: CPT

## 2025-01-16 PROCEDURE — 25010000002 HYDROMORPHONE 1 MG/ML SOLUTION: Performed by: HOSPITALIST

## 2025-01-16 PROCEDURE — 94799 UNLISTED PULMONARY SVC/PX: CPT

## 2025-01-16 PROCEDURE — 83735 ASSAY OF MAGNESIUM: CPT

## 2025-01-16 PROCEDURE — 63710000001 PREDNISONE PER 1 MG

## 2025-01-16 PROCEDURE — 94618 PULMONARY STRESS TESTING: CPT

## 2025-01-16 RX ORDER — BLOOD-GLUCOSE METER
1 EACH MISCELLANEOUS
Qty: 1 KIT | Refills: 0 | Status: SHIPPED | OUTPATIENT
Start: 2025-01-16

## 2025-01-16 RX ORDER — BLOOD SUGAR DIAGNOSTIC
1 STRIP MISCELLANEOUS
Qty: 100 EACH | Refills: 2 | Status: SHIPPED | OUTPATIENT
Start: 2025-01-16

## 2025-01-16 RX ORDER — LANCETS
1 EACH MISCELLANEOUS
Qty: 100 EACH | Refills: 2 | Status: SHIPPED | OUTPATIENT
Start: 2025-01-16

## 2025-01-16 RX ORDER — DOXYCYCLINE 100 MG/1
100 CAPSULE ORAL EVERY 12 HOURS SCHEDULED
Qty: 27 CAPSULE | Refills: 0 | Status: SHIPPED | OUTPATIENT
Start: 2025-01-16 | End: 2025-01-30

## 2025-01-16 RX ADMIN — DOXYCYCLINE 100 MG: 100 CAPSULE ORAL at 08:54

## 2025-01-16 RX ADMIN — LISINOPRIL 40 MG: 20 TABLET ORAL at 08:54

## 2025-01-16 RX ADMIN — PREDNISONE 20 MG: 20 TABLET ORAL at 08:54

## 2025-01-16 RX ADMIN — HYDROMORPHONE HYDROCHLORIDE 0.5 MG: 1 INJECTION, SOLUTION INTRAMUSCULAR; INTRAVENOUS; SUBCUTANEOUS at 11:53

## 2025-01-16 RX ADMIN — BUPROPION HYDROCHLORIDE 300 MG: 150 TABLET, EXTENDED RELEASE ORAL at 08:54

## 2025-01-16 RX ADMIN — INSULIN GLARGINE 10 UNITS: 100 INJECTION, SOLUTION SUBCUTANEOUS at 08:55

## 2025-01-16 RX ADMIN — BUDESONIDE AND FORMOTEROL FUMARATE DIHYDRATE 2 PUFF: 160; 4.5 AEROSOL RESPIRATORY (INHALATION) at 08:49

## 2025-01-16 RX ADMIN — ASPIRIN 81 MG: 81 TABLET, COATED ORAL at 08:54

## 2025-01-16 RX ADMIN — PANTOPRAZOLE SODIUM 40 MG: 40 TABLET, DELAYED RELEASE ORAL at 08:54

## 2025-01-16 RX ADMIN — PYRIDOSTIGMINE BROMIDE 180 MG: 60 TABLET ORAL at 08:54

## 2025-01-16 RX ADMIN — AMOXICILLIN AND CLAVULANATE POTASSIUM 1 TABLET: 875; 125 TABLET, FILM COATED ORAL at 08:54

## 2025-01-16 NOTE — PROCEDURES
Exercise Oximetry    Patient Name:Tha Barron   MRN: [id number]   Date: 01/16/25             ROOM AIR BASELINE   SpO2% 93   Heart Rate 73   Blood Pressure      EXERCISE ON ROOM AIR SpO2% EXERCISE ON O2 @ LPM SpO2%   1 MINUTE 93 1 MINUTE    2 MINUTES 90 2 MINUTES    3 MINUTES 91 3 MINUTES    4 MINUTES 88 4 MINUTES    5 MINUTES  5 MINUTES 3L 92   6 MINUTES  6 MINUTES 3L 92              Distance Walked   Distance Walked   Dyspnea (Santi Scale)   Dyspnea (Santi Scale)   Fatigue (Santi Scale)   Fatigue (Santi Scale)   SpO2% Post Exercise   SpO2% Post Exercise   HR Post Exercise   HR Post Exercise   Time to Recovery   Time to Recovery     Comments: Pt sats 93% on room air.  Sats 92% on 3L/NC with exercise.

## 2025-01-16 NOTE — PLAN OF CARE
Goal Outcome Evaluation:  Plan of Care Reviewed With: patient           Outcome Evaluation: pt requested tylenol and melatonin at bedtime; remains on oral abx; bilateral great toes painted with betadine, open to air; O2 at 3L per n/c in use while sleeping; up ad ajay; continue to monitor

## 2025-01-16 NOTE — CASE MANAGEMENT/SOCIAL WORK
Continued Stay Note   Ran     Patient Name: Tha Barron  MRN: [id number]  Today's Date: 1/16/2025    Admit Date: 1/13/2025    Plan: Return to group home. New home O2 3L w/ [employer].   Discharge Plan       Row Name 01/16/25 1247       Plan    Plan Return to group home. New home O2 3L w/ [employer].    Patient/Family in Agreement with Plan yes    Plan Comments MURIEL notified by RT that pt will require 3L O2 per walking oximetry test. Added to [place] basket and rep Hassan notified, but pt's insurance is out of network. MURIEL contacted [facility] by phone and spoke to Shanta. Faxed dc summary,  walking ox note, and O2 order to [facility]. Spoke to pt at bedside regarding transportation and obtained group home owner's contact information to call once pt is ready to go.             Megan Naegele, RN     Office Phone: [phone]  Office Cell: [phone]

## 2025-01-16 NOTE — PROGRESS NOTES
Infectious Diseases Progress Note      LOS: 3 days   Patient Care Team:  Isreal Del Toro APRN as PCP - General (Nurse Practitioner)    Chief Complaint: Bilateral    Subjective       The patient has been afebrile for the last 24 hours.  The patient is on room air, hemodynamically stable, and is tolerating antimicrobial therapy.  No new complaints      Review of Systems:   Review of Systems   Constitutional: Negative.    HENT: Negative.     Eyes: Negative.    Respiratory: Negative.     Cardiovascular: Negative.    Gastrointestinal: Negative.    Endocrine: Negative.    Genitourinary: Negative.    Musculoskeletal: Negative.    Skin:  Positive for wound.   Neurological: Negative.    Psychiatric/Behavioral: Negative.     All other systems reviewed and are negative.       Objective     Vital Signs  Temp:  [97.5 °F (36.4 °C)-98.2 °F (36.8 °C)] 98.2 °F (36.8 °C)  Heart Rate:  [55-84] 60  Resp:  [15-18] 16  BP: (109-117)/(54-66) 111/66    Physical Exam:  Physical Exam  Vitals and nursing note reviewed.   Constitutional:       General: He is not in acute distress.     Appearance: Normal appearance. He is well-developed and normal weight. He is not diaphoretic.   HENT:      Head: Normocephalic and atraumatic.   Eyes:      Conjunctiva/sclera: Conjunctivae normal.      Pupils: Pupils are equal, round, and reactive to light.   Cardiovascular:      Rate and Rhythm: Normal rate and regular rhythm.      Heart sounds: Normal heart sounds, S1 normal and S2 normal.   Pulmonary:      Effort: Pulmonary effort is normal. No respiratory distress.      Breath sounds: Normal breath sounds. No stridor. No wheezing or rales.   Abdominal:      General: Bowel sounds are normal. There is no distension.      Palpations: Abdomen is soft. There is no mass.      Tenderness: There is no abdominal tenderness. There is no guarding.   Musculoskeletal:         General: No deformity. Normal range of motion.      Cervical back: Neck supple.   Skin:      General: Skin is warm and dry.      Coloration: Skin is not pale.      Findings: No erythema or rash.      Comments: Patient is stable looking wounds to the distal aspect of both great toes.  Pulses are easily palpable.  Currently no drainage    Neurological:      Mental Status: He is alert and oriented to person, place, and time.      Cranial Nerves: No cranial nerve deficit.   Psychiatric:         Mood and Affect: Mood normal.          Results Review:    I have reviewed all clinical data, test, lab, and imaging results.     Radiology  Walking Oximetry    Result Date: 1/16/2025  Maddi Salazar, RRT     1/16/2025 10:30 AM Exercise Oximetry Patient Name:Tha Barron MRN: [id number] Date: 01/16/25         ROOM AIR BASELINE SpO2% 93 Heart Rate 73 Blood Pressure  EXERCISE ON ROOM AIR SpO2% EXERCISE ON O2 @ LPM SpO2% 1 MINUTE 93 1 MINUTE  2 MINUTES 90 2 MINUTES  3 MINUTES 91 3 MINUTES  4 MINUTES 88 4 MINUTES  5 MINUTES  5 MINUTES 3L 92 6 MINUTES  6 MINUTES 3L 92          Distance Walked   Distance Walked Dyspnea (Santi Scale)   Dyspnea (Santi Scale) Fatigue (Santi Scale)   Fatigue (Santi Scale) SpO2% Post Exercise   SpO2% Post Exercise HR Post Exercise   HR Post Exercise Time to Recovery   Time to Recovery Comments: Pt sats 93% on room air. Sats 92% on 3L/NC with exercise.     Cardiology    Laboratory    Results from last 7 days   Lab Units 01/16/25  0422 01/15/25  0255 01/14/25  0611 01/13/25  1242   WBC 10*3/mm3 8.50 8.84 8.29 10.85*   HEMOGLOBIN g/dL 13.4 13.5 12.7* 13.2   HEMATOCRIT % 42.3 43.1 41.4 41.0   PLATELETS 10*3/mm3 261 293 267 269     Results from last 7 days   Lab Units 01/16/25  0422 01/15/25  0255 01/14/25  0611 01/13/25  1242   SODIUM mmol/L 139 142 140 139   POTASSIUM mmol/L 4.0 4.3 4.4 4.4   CHLORIDE mmol/L 103 104 105 102   CO2 mmol/L 28.1 30.5* 26.8 22.6   BUN mg/dL 16 13 13 14   CREATININE mg/dL 0.80 0.86 0.96 1.03   GLUCOSE mg/dL 111* 118* 89 115*   ALBUMIN g/dL  --   --   --  3.7    BILIRUBIN mg/dL  --   --   --  0.2   ALK PHOS U/L  --   --   --  54   AST (SGOT) U/L  --   --   --  47*   ALT (SGPT) U/L  --   --   --  42*   CALCIUM mg/dL 9.0 9.7 9.3 9.2         Results from last 7 days   Lab Units 01/13/25  1242   SED RATE mm/hr 31*         Microbiology   Microbiology Results (last 10 days)       Procedure Component Value - Date/Time    Wound Culture - Wound, Toe, Left [[id number]] Collected: 01/14/25 1701    Lab Status: Final result Specimen: Wound from Toe, Left Updated: 01/16/25 0933     Wound Culture Scant growth (1+) Normal Skin Stacey     Gram Stain No organisms seen    COVID-19, FLU A/B, RSV PCR 1 HR TAT - Swab, Nasopharynx [[id number]]  (Normal) Collected: 01/14/25 1451    Lab Status: Final result Specimen: Swab from Nasopharynx Updated: 01/14/25 1557     COVID19 Not Detected     Influenza A PCR Not Detected     Influenza B PCR Not Detected     RSV, PCR Not Detected    Narrative:      Fact sheet for providers: https://www.fda.gov/media/968263/download    Fact sheet for patients: https://www.fda.gov/media/903662/download    Test performed by PCR.    MRSA Screen, PCR (Inpatient) - Swab, Nares [[id number]]  (Normal) Collected: 01/13/25 1519    Lab Status: Final result Specimen: Swab from Nares Updated: 01/13/25 1703     MRSA PCR No MRSA Detected    Narrative:      The negative predictive value of this diagnostic test is high and should only be used to consider de-escalating anti-MRSA therapy. A positive result may indicate colonization with MRSA and must be correlated clinically.    Blood Culture - Blood, Hand, Left [[id number]]  (Normal) Collected: 01/13/25 1319    Lab Status: Preliminary result Specimen: Blood from Hand, Left Updated: 01/16/25 1330     Blood Culture No growth at 3 days    Blood Culture - Blood, Arm, Right [[id number]]  (Normal) Collected: 01/13/25 1242    Lab Status: Preliminary result Specimen: Blood from Arm, Right Updated: 01/16/25 1300     Blood Culture No growth at 3 days     Narrative:      Less than seven (7) mL's of blood was collected.  Insufficient quantity may yield false negative results.            Medication Review:       Schedule Meds      Infusion Meds  No current facility-administered medications for this encounter.      PRN Meds          Assessment & Plan       Antimicrobial Therapy   1.  P.o. Augmentin        2.  P.o. doxycycline        3.        4.        5.            Assessment     Bilateral wounds to the distal aspect of both toes-left worse than right.  Currently no drainage  there is no significant erythema or edema to the dorsal aspect of the foot.  Pulses are easily palpable.  Culture of the left toe pending-patient states there was some drainage at admission.  MRI of the left foot showed commuted fracture of the distal phalanx of the great toe.  Culture grew skin sophia     Type 2 diabetes     COPD-current everyday smoker     Myasthenia gravis     Plan     Continue p.o. Augmentin 875/125 mg twice daily for 14 days  Continue p.o. doxycycline 100 mg twice daily for 14 days  Continue supportive care  Patient needs to follow with podiatry as outpatient  Tobacco abuse cessation  Okay to discharge from Infectious Disease standpoint      Megan Fagan, APRN  01/16/25  18:06 EST    Note is dictated utilizing voice recognition software/Dragon

## 2025-01-16 NOTE — DISCHARGE SUMMARY
"             [facility] Medicine Services  Discharge Summary    Date of Service: 25  Patient Name: Tha Barron  : 1955  MRN: [id number]    Date of Admission: 2025  Discharge Diagnosis: Diabetic foot ulcer with cellulitis of left foot  Date of Discharge:  25  Primary Care Physician: Isreal Del Toro APRN    Presenting Problem:   Cellulitis of left foot [L03.116]  Cellulitis of left lower extremity [L03.116]  Uncontrolled type 2 diabetes mellitus with hyperglycemia [E11.65]  Diabetic ulcer of toe of left foot associated with type 2 diabetes mellitus, with fat layer exposed [E11.621, L97.522]  Diabetic ulcer of toe of right foot associated with type 2 diabetes mellitus, with fat layer exposed [E11.621, L97.512]  Sepsis, unspecified organism [A41.9]  Osteomyelitis of great toe of left foot [M86.9]  Sepsis, due to unspecified organism, unspecified whether acute organ dysfunction present [A41.9]    Active and Resolved Hospital Problems:  Active Hospital Problems    Diagnosis POA   • **Cellulitis of left lower extremity [L03.116] Yes      Resolved Hospital Problems   No resolved problems to display.         Hospital Course     HPI:  Per the H&P written by Amna Church, dated 25:  \"Tha Barron is a 69 y.o. male with a CMH of type 2 diabetes mellitus, HLD, COPD, myasthenia gravis, GERD, depression who presented to [facility] on 2025 with worsening pain and redness of left foot.  He also endorses right great toe pain as well.  He reports that he was gifted cheese Joanne that he believes were \"too small \"but states was wearing them anyway. Denies nausea, vomiting, fever, chills.  Patient reports that he is compliant with insulin but takes glucometer has since broken and has not been able to afford replacement.     On ED evaluation, patient was noted to be hemodynamically stable and afebrile.  CMP was with mildly elevated AST/ALT but otherwise " "unremarkable.  CBC with mild leukocytosis with WBC of 10.8.  ESR was 31.  PCT 0.05 with a lactic acid of 2.6.  Despite patient receiving sepsis treatment/IVF's, patient did not meet sepsis criteria according to vitals and laboratory values.  X-ray of left foot revealed relative nondisplaced Meted fracture involving distal phalanx of the left first toe with likely underlying/superimposed osteomyelitis.  Patient started on Unasyn and vancomycin and also given Tdap.  Hospitalist service to admit for further evaluation and management.\"    Hospital Course:  Patient admitted as above. Podiatry consulted. No surgical intervention planned at this time. ID consulted with transition from IV to oral antibiotic with plans to monitor response and have close outpatient follow up. Noted that patient required new supplemental oxygen overnight. Patient underwent 6-minute walk test which demonstrated patient need for supplemental oxygen at 3L via nasal cannula. Patient appropriate for discharge with outpatient follow up.     DISCHARGE Follow Up Recommendations for labs and diagnostics:   - Follow up with PCP within 2 weeks of discharge  - Follow up with podiatry within 2 weeks of discharge  - Would recommend further evaluation of COPD with formal PFTs if not previously performed; may need outpatient referral to pulmonology    Day of Discharge     Vital Signs:  Temp:  [97.5 °F (36.4 °C)-98.3 °F (36.8 °C)] 98.2 °F (36.8 °C)  Heart Rate:  [55-84] 60  Resp:  [15-18] 16  BP: (109-119)/(54-66) 109/57  Flow (L/min) (Oxygen Therapy):  [3-4] 3    Physical Exam:   General: No acute distress  Neuro: AAOx3, no FND  CV: RRR, no peripheral edema  Pulm: CTAB, no increased work of breathing  Abd: Soft, nontender, nondistended  Skin: Left foot bandaged without drainage  Psych: Appropriate mood and affect    Pertinent  and/or Most Recent Results     LAB RESULTS:      Lab 01/16/25  0422 01/15/25  0255 01/14/25  0611 01/13/25 2000 01/13/25  1602 " 01/13/25  1247 01/13/25  1242   WBC 8.50 8.84 8.29  --   --   --  10.85*   HEMOGLOBIN 13.4 13.5 12.7*  --   --   --  13.2   HEMATOCRIT 42.3 43.1 41.4  --   --   --  41.0   PLATELETS 261 293 267  --   --   --  269   NEUTROS ABS 5.71 6.50 5.57  --   --   --  9.49*   IMMATURE GRANS (ABS) 0.06* 0.05 0.06*  --   --   --  0.05   LYMPHS ABS 2.01 1.66 1.98  --   --   --  0.89   MONOS ABS 0.57 0.53 0.55  --   --   --  0.34   EOS ABS 0.12 0.08 0.10  --   --   --  0.06   MCV 94.2 95.1 95.4  --   --   --  95.3   SED RATE  --   --   --   --   --   --  31*   CRP  --   --   --   --   --   --  0.54*   PROCALCITONIN  --   --   --   --   --   --  0.05   LACTATE  --   --   --  1.8 3.2* 2.6*  --          Lab 01/16/25  0422 01/15/25  0255 01/14/25  0611 01/13/25  1242   SODIUM 139 142 140 139   POTASSIUM 4.0 4.3 4.4 4.4   CHLORIDE 103 104 105 102   CO2 28.1 30.5* 26.8 22.6   ANION GAP 7.9 7.5 8.2 14.4   BUN 16 13 13 14   CREATININE 0.80 0.86 0.96 1.03   EGFR 95.2 93.1 85.0 78.1   GLUCOSE 111* 118* 89 115*   CALCIUM 9.0 9.7 9.3 9.2   MAGNESIUM 2.0 2.1 1.9  --    HEMOGLOBIN A1C  --  6.46*  --   --          Lab 01/13/25  1242   TOTAL PROTEIN 6.7   ALBUMIN 3.7   GLOBULIN 3.0   ALT (SGPT) 42*   AST (SGOT) 47*   BILIRUBIN 0.2   ALK PHOS 54                     Brief Urine Lab Results  (Last result in the past 365 days)        Color   Clarity   Blood   Leuk Est   Nitrite   Protein   CREAT   Urine HCG        01/13/25 1251 Yellow   Clear   Negative   Negative   Negative   Negative                 Microbiology Results (last 10 days)       Procedure Component Value - Date/Time    Wound Culture - Wound, Toe, Left [[id number]] Collected: 01/14/25 1701    Lab Status: Final result Specimen: Wound from Toe, Left Updated: 01/16/25 0921     Wound Culture Scant growth (1+) Normal Skin Stacey     Gram Stain No organisms seen    COVID-19, FLU A/B, RSV PCR 1 HR TAT - Swab, Nasopharynx [[id number]]  (Normal) Collected: 01/14/25 1451    Lab Status: Final result  Specimen: Swab from Nasopharynx Updated: 01/14/25 1557     COVID19 Not Detected     Influenza A PCR Not Detected     Influenza B PCR Not Detected     RSV, PCR Not Detected    Narrative:      Fact sheet for providers: https://www.fda.gov/media/835391/download    Fact sheet for patients: https://www.fda.gov/media/844707/download    Test performed by PCR.    MRSA Screen, PCR (Inpatient) - Swab, Nares [[id number]]  (Normal) Collected: 01/13/25 1519    Lab Status: Final result Specimen: Swab from Nares Updated: 01/13/25 1703     MRSA PCR No MRSA Detected    Narrative:      The negative predictive value of this diagnostic test is high and should only be used to consider de-escalating anti-MRSA therapy. A positive result may indicate colonization with MRSA and must be correlated clinically.    Blood Culture - Blood, Hand, Left [[id number]]  (Normal) Collected: 01/13/25 1319    Lab Status: Preliminary result Specimen: Blood from Hand, Left Updated: 01/15/25 1330     Blood Culture No growth at 2 days    Blood Culture - Blood, Arm, Right [[id number]]  (Normal) Collected: 01/13/25 1242    Lab Status: Preliminary result Specimen: Blood from Arm, Right Updated: 01/15/25 1300     Blood Culture No growth at 2 days    Narrative:      Less than seven (7) mL's of blood was collected.  Insufficient quantity may yield false negative results.          MRI Foot Left With & Without Contrast    Result Date: 1/14/2025  Impression: Impression: There is a comminuted fracture distal phalanx great toe with abnormal marrow signal intensity. It is uncertain whether this is due to a pathologic fracture or posttraumatic fracture. Correlate with history and symptoms. No drainable fluid collection suggest abscess. Questionable ulceration at the distal tip of the great toe. Small joint effusion of the great toe interphalangeal joint. Mild arthritis first and fifth metatarsal phalangeal joint. Electronically Signed: Lana Nguyen MD  1/14/2025 8:41 AM EST   Workstation ID: [id number]    XR Foot 2 View Bilateral    Result Date: 1/13/2025  Impression: Impression: 1.Relative nondisplaced comminuted fracture involving the distal phalanx of the left first toe with likely underlying/superimposed osteomyelitis. Correlate with symptoms and history. Electronically Signed: Marko Dimas MD  1/13/2025 1:55 PM EST  Workstation ID: [id number]   Results for orders placed during the hospital encounter of 01/13/25    Doppler Ankle Brachial Index Single Level CAR    Interpretation Summary  •  Right Conclusion: The right VANI is normal. Normal digital pressures.  •  Left Conclusion: The left VANI is normal. Normal digital pressures.    Results for orders placed during the hospital encounter of 01/13/25    Doppler Ankle Brachial Index Single Level CAR    Interpretation Summary  •  Right Conclusion: The right VANI is normal. Normal digital pressures.  •  Left Conclusion: The left VANI is normal. Normal digital pressures.        Labs Pending at Discharge:   Pending Results       None            Procedures Performed   None  01/16 1027 Walking Oximetry    Consults:   Consults       Date and Time Order Name Status Description    1/14/2025  4:09 PM Inpatient Infectious Diseases Consult Completed     1/14/2025  1:01 PM Inpatient Infectious Diseases Consult Completed     1/13/2025  4:38 PM Inpatient Podiatry Consult Completed             Discharge Details        Discharge Medications        New Medications        Instructions Start Date   Accu-Chek Guide Me w/Device kit   1 kit, Not Applicable, 3 Times Daily Before Meals, Dx code: E11.65      Accu-Chek Guide Test test strip  Generic drug: glucose blood   1 each, Other, 3 Times Daily Before Meals, Dx code: E11.65      amoxicillin-clavulanate 875-125 MG per tablet  Commonly known as: AUGMENTIN   1 tablet, Oral, Every 12 Hours Scheduled      doxycycline 100 MG capsule  Commonly known as: MONODOX   100 mg, Oral, Every 12 Hours Scheduled      TRUEplus  Lancets 30G misc   1 each, Other, 3 Times Daily Before Meals, Use as instructed Dx code: E11.65             Continue These Medications        Instructions Start Date   Aspirin Low Dose 81 MG EC tablet  Generic drug: aspirin   81 mg, Daily      atorvastatin 20 MG tablet  Commonly known as: LIPITOR   20 mg, Nightly      budesonide-formoterol 160-4.5 MCG/ACT inhaler  Commonly known as: SYMBICORT   2 puffs, 2 Times Daily - RT      buPROPion  MG 24 hr tablet  Commonly known as: WELLBUTRIN XL   300 mg, Every Morning      Farxiga 10 MG tablet  Generic drug: dapagliflozin Propanediol   10 mg, Daily      insulin glargine 100 UNIT/ML injection  Commonly known as: LANTUS, SEMGLEE   10 Units, Daily      lisinopril 40 MG tablet  Commonly known as: PRINIVIL,ZESTRIL   40 mg, Daily      meloxicam 15 MG tablet  Commonly known as: MOBIC   15 mg, Daily      metFORMIN 1000 MG tablet  Commonly known as: GLUCOPHAGE   1,000 mg, 2 Times Daily      omeprazole 40 MG capsule  Commonly known as: priLOSEC   40 mg, Daily      predniSONE 20 MG tablet  Commonly known as: DELTASONE   20 mg, Daily      pyridostigmine 180 MG CR tablet  Commonly known as: MESTINON   180 mg, 2 Times Daily      traZODone 100 MG tablet  Commonly known as: DESYREL   200 mg, Nightly      Valbenazine Tosylate 80 MG capsule   80 mg, Every Morning      varenicline 0.5 MG tablet  Commonly known as: CHANTIX   0.5 mg, 2 Times Daily      zolpidem 10 MG tablet  Commonly known as: AMBIEN   10 mg, Nightly               No Known Allergies    Discharge Disposition:   Home    Diet:  Diabetic    Discharge Activity:   As tolerated    CODE STATUS:  Code Status and Medical Interventions: CPR (Attempt to Resuscitate); Full Support   Ordered at: 01/13/25 1402     Code Status (Patient has no pulse and is not breathing):    CPR (Attempt to Resuscitate)     Medical Interventions (Patient has pulse or is breathing):    Full Support       No future appointments.    Additional Instructions  for the Follow-ups that You Need to Schedule       Discharge Follow-up with PCP   As directed       Currently Documented PCP:    Isreal Del Toro APRN    PCP Phone Number:    [phone]     Follow Up Details: Follow up with PCP within 2 weeks of discharge                Time spent on Discharge including face to face service:  >30 minutes    Signature: Electronically signed by Luz Duarte MD, 01/16/25, 10:54 EST.  [facility] Hospitalist Team

## 2025-01-16 NOTE — DISCHARGE PLACEMENT REQUEST
"Ion Barron (69 y.o. Male)       Date of Birth   1955    Social Security Number       Address   [address] [place] [address]    Home Phone   [phone]    MRN   [id number]       Christianity   None    Marital Status   Single                            Admission Date   1/13/25    Admission Type   Emergency    Admitting Provider   Adnois Oliver MD    Attending Provider   Luz Duarte MD    Department, Room/Bed   [facility] INPATIENT, 357/1       Discharge Date       Discharge Disposition   Home or Self Care    Discharge Destination                                 Attending Provider: Luz Duarte MD    Allergies: No Known Allergies    Isolation: None   Infection: None   Code Status: CPR    Ht: 190.5 cm (75\")   Wt: 118 kg (260 lb)    Admission Cmt: None   Principal Problem: Cellulitis of left lower extremity [L03.116]                   Active Insurance as of 1/13/2025       Primary Coverage       Payor Plan Insurance Group Employer/Plan Group    [employer]ID [facility] [id number]       Payor Plan Address Payor Plan Phone Number Payor Plan Fax Number Effective Dates    [address]   7/1/2024 - None Entered    [facility] [id number]         Subscriber Name Subscriber Birth Date Member ID       ION BARRON 1955 [id number]                     Emergency Contacts        (Rel.) Home Phone Work Phone Mobile Phone    Kinjal Varela (Sister) [phone] -- [phone]          "

## 2025-01-16 NOTE — CASE MANAGEMENT/SOCIAL WORK
Case Management Discharge Note      Final Note: Home.         Selected Continued Care - Discharged on 1/16/2025 Admission date: 1/13/2025 - Discharge disposition: Home or Self Care      Durable Medical Equipment Coordination complete.      Service Provider Services Address Phone Fax Patient Preferred    [facility] [address], [place] [address] [phone] --             Transportation Services  Private: Car    Final Discharge Disposition Code: 01 - home or self-care

## 2025-01-17 ENCOUNTER — TELEPHONE (OUTPATIENT)
Dept: DIABETES SERVICES | Facility: HOSPITAL | Age: 70
End: 2025-01-17
Payer: MEDICAID

## 2025-01-17 NOTE — OUTREACH NOTE
Prep Survey      Flowsheet Row Responses   [facility] facility patient discharged from? [facility]   Is LACE score < 7 ? No   Eligibility Readm Mgmt   Discharge diagnosis Cellulitis of left lower extremity   Does the patient have one of the following disease processes/diagnoses(primary or secondary)? Other   Does the patient have Home health ordered? No   Is there a DME ordered? Yes   What DME was ordered? [facility] for O2 3L   Prep survey completed? Yes            KASHIF BURR - Registered Nurse

## 2025-01-18 LAB
BACTERIA SPEC AEROBE CULT: NORMAL
BACTERIA SPEC AEROBE CULT: NORMAL

## 2025-01-20 ENCOUNTER — TELEPHONE (OUTPATIENT)
Dept: DIABETES SERVICES | Facility: HOSPITAL | Age: 70
End: 2025-01-20
Payer: MEDICAID

## 2025-01-20 NOTE — TELEPHONE ENCOUNTER
Pt states he did receive the [employer] Guide meter, test strips and lancets. Pt states the lancets are not the correct type for lancing device. Educator encouraged pt to contact pharmacy to discuss wrong lancets given so he is able to get correct lancets. Pt states will contact pharmacy. Pt with no additional questions.

## 2025-01-21 ENCOUNTER — READMISSION MANAGEMENT (OUTPATIENT)
Dept: CALL CENTER | Facility: HOSPITAL | Age: 70
End: 2025-01-21
Payer: MEDICAID

## 2025-01-21 NOTE — OUTREACH NOTE
Medical Week 1 Survey      Flowsheet Row Responses   [facility]test[facility] facility patient discharged from? [facility]   Does the patient have one of the following disease processes/diagnoses(primary or secondary)? Other   Week 1 attempt successful? No   Unsuccessful attempts Attempt 1  [A different number was given by caregiver,  mailbox full when number was attempted]            Martina PALENCIA - Registered Nurse

## 2025-01-28 ENCOUNTER — READMISSION MANAGEMENT (OUTPATIENT)
Dept: CALL CENTER | Facility: HOSPITAL | Age: 70
End: 2025-01-28
Payer: MEDICAID

## 2025-01-28 NOTE — OUTREACH NOTE
Medical Week 2 Survey      Flowsheet Row Responses   Yazidi facility patient discharged from? [facility]   Does the patient have one of the following disease processes/diagnoses(primary or secondary)? Other   Week 2 attempt successful? No   Unsuccessful attempts Attempt 1  [Pt lives in Adult Group home]            Malorie ROWLAND - Registered Nurse

## 2025-02-04 ENCOUNTER — READMISSION MANAGEMENT (OUTPATIENT)
Dept: CALL CENTER | Facility: HOSPITAL | Age: 70
End: 2025-02-04
Payer: MEDICAID

## 2025-02-04 NOTE — OUTREACH NOTE
Medical Week 3 Survey      Flowsheet Row Responses   [facility] patient discharged from? [facility]   Does the patient have one of the following disease processes/diagnoses(primary or secondary)? Other   Week 3 attempt successful? No   Unsuccessful attempts Attempt 1   Revoke Decline to participate            Deja LOUIS - Licensed Nurse

## (undated) DEVICE — SOL IRRIG NACL 1000ML

## (undated) DEVICE — UNDERGLV SURG BIOGEL INDICATOR LF PF 7.5

## (undated) DEVICE — CVR HNDL LT SURG ACCSSRY BLU STRL

## (undated) DEVICE — ARM DRAPE

## (undated) DEVICE — LAPAROSCOPIC GAS CONDITIONING DEVICE.: Brand: INSUFLOW

## (undated) DEVICE — SUT VIC 0 UR6 27IN VCP603H

## (undated) DEVICE — ANTIBACTERIAL UNDYED BRAIDED (POLYGLACTIN 910), SYNTHETIC ABSORBABLE SUTURE: Brand: COATED VICRYL

## (undated) DEVICE — BLADELESS OBTURATOR: Brand: WECK VISTA

## (undated) DEVICE — PASS SUT PRO BARIATRIC XL W/TROC SWABS

## (undated) DEVICE — SLV SCD CALF HEMOFORCE DVT THERP REPROC MD

## (undated) DEVICE — COLUMN DRAPE

## (undated) DEVICE — BG RETRV TISS SUPERBAG INTRO RIP/STOP NLY 10MM 240ML MD

## (undated) DEVICE — Device

## (undated) DEVICE — SEAL

## (undated) DEVICE — GENERAL LAPAROSCOPY CDS: Brand: MEDLINE INDUSTRIES, INC.

## (undated) DEVICE — SUCTION IRRIGATOR: Brand: ENDOWRIST

## (undated) DEVICE — BLANKT WARM UPPR/BDY ARM/OUT 57X196CM

## (undated) DEVICE — SOLUTION,WATER,IRRIGATION,1000ML,STERILE: Brand: MEDLINE

## (undated) DEVICE — KT SURG TURNOVER 050

## (undated) DEVICE — LAPAROVUE VISIBILITY SYSTEM LAPAROSCOPIC SOLUTIONS: Brand: LAPAROVUE

## (undated) DEVICE — CANNULA SEAL

## (undated) DEVICE — GLV SURG BIOGEL LTX PF 7

## (undated) DEVICE — SYR LUERLOK 30CC

## (undated) DEVICE — ADHS SKIN PREMIERPRO EXOFIN TOPICAL HI/VISC .5ML

## (undated) DEVICE — APPL HEMO ENDO SURGICEL 2IN1 1P/U STRL

## (undated) DEVICE — ST TBG AIRSEAL BIF FLTR W/ACT/CHARCOAL/FLTR